# Patient Record
Sex: MALE | Race: WHITE | Employment: UNEMPLOYED | URBAN - METROPOLITAN AREA
[De-identification: names, ages, dates, MRNs, and addresses within clinical notes are randomized per-mention and may not be internally consistent; named-entity substitution may affect disease eponyms.]

---

## 2018-04-20 ENCOUNTER — HOSPITAL ENCOUNTER (OUTPATIENT)
Age: 58
Discharge: HOME | End: 2018-04-22
Payer: MEDICARE

## 2018-04-20 PROCEDURE — 80076 HEPATIC FUNCTION PANEL: CPT

## 2018-04-21 LAB
ALBUMIN SERPL-MCNC: 4.2 G/DL (ref 3.5–5.2)
ALP BLD-CCNC: 61 U/L (ref 40–129)
ALT SERPL-CCNC: 11 U/L (ref 0–40)
AST SERPL-CCNC: 17 U/L (ref 0–39)
BILIRUB SERPL-MCNC: 0.2 MG/DL (ref 0–1.2)
BILIRUBIN DIRECT: <0.2 MG/DL (ref 0–0.3)
BILIRUBIN, INDIRECT: NORMAL MG/DL (ref 0–1)
TOTAL PROTEIN: 7.5 G/DL (ref 6.4–8.3)

## 2020-10-22 ENCOUNTER — OFFICE VISIT (OUTPATIENT)
Dept: FAMILY MEDICINE CLINIC | Age: 60
End: 2020-10-22
Payer: MEDICARE

## 2020-10-22 VITALS
BODY MASS INDEX: 25.62 KG/M2 | SYSTOLIC BLOOD PRESSURE: 136 MMHG | TEMPERATURE: 97.8 F | DIASTOLIC BLOOD PRESSURE: 84 MMHG | WEIGHT: 173 LBS | OXYGEN SATURATION: 98 % | HEART RATE: 68 BPM | HEIGHT: 69 IN

## 2020-10-22 PROBLEM — I10 ESSENTIAL HYPERTENSION: Status: ACTIVE | Noted: 2020-10-22

## 2020-10-22 PROBLEM — F41.9 ANXIETY: Status: ACTIVE | Noted: 2020-10-22

## 2020-10-22 PROCEDURE — 99214 OFFICE O/P EST MOD 30 MIN: CPT | Performed by: FAMILY MEDICINE

## 2020-10-22 RX ORDER — ESCITALOPRAM OXALATE 20 MG/1
20 TABLET ORAL DAILY
Qty: 30 TABLET | Refills: 5 | Status: SHIPPED
Start: 2020-10-22 | End: 2021-01-11 | Stop reason: SDUPTHER

## 2020-10-22 RX ORDER — HYDROXYZINE PAMOATE 25 MG/1
CAPSULE ORAL
COMMUNITY
Start: 2019-07-15 | End: 2020-10-22 | Stop reason: SDUPTHER

## 2020-10-22 RX ORDER — ESCITALOPRAM OXALATE 20 MG/1
TABLET ORAL
COMMUNITY
Start: 2019-05-30 | End: 2020-10-22 | Stop reason: SDUPTHER

## 2020-10-22 RX ORDER — HYDROCHLOROTHIAZIDE 25 MG/1
25 TABLET ORAL DAILY
Qty: 30 TABLET | Refills: 5 | Status: SHIPPED
Start: 2020-10-22 | End: 2021-01-11 | Stop reason: SDUPTHER

## 2020-10-22 RX ORDER — HYDROXYZINE PAMOATE 25 MG/1
25 CAPSULE ORAL 3 TIMES DAILY PRN
Qty: 90 CAPSULE | Refills: 2 | Status: SHIPPED
Start: 2020-10-22 | End: 2021-01-11 | Stop reason: SDUPTHER

## 2020-10-22 RX ORDER — HYDROCHLOROTHIAZIDE 25 MG/1
TABLET ORAL
COMMUNITY
Start: 2019-05-30 | End: 2020-10-22 | Stop reason: SDUPTHER

## 2020-10-22 ASSESSMENT — ENCOUNTER SYMPTOMS
NAUSEA: 0
SHORTNESS OF BREATH: 0
VOMITING: 0
WHEEZING: 0

## 2020-10-22 NOTE — PROGRESS NOTES
89 Shaista Hager presents to the office today for   Chief Complaint   Patient presents with    Annual Exam     Anxiety and Depression  Taking Lexapro 20 mg po qd  It is helping  Using Vistaril to help him sleep with good effect    Hypertension  HCTZ without side effects  Feeling well    Sees Dr. Rizwan Miner for PSA yearly  Up to date    Cologuard done 2019 and neg through [facility]    Review of Systems   Constitutional: Negative for chills and fever. Respiratory: Negative for shortness of breath and wheezing. Cardiovascular: Negative for chest pain and palpitations. Gastrointestinal: Negative for nausea and vomiting. Genitourinary: Negative for dysuria, hematuria and urgency. Skin: Negative for rash. Neurological: Negative for dizziness and light-headedness. /84   Pulse 68   Temp 97.8 °F (36.6 °C) (Temporal)   Ht 5' 9\" (1.753 m)   Wt 173 lb (78.5 kg)   SpO2 98%   BMI 25.55 kg/m²   Physical Exam  Constitutional:       Appearance: Normal appearance. HENT:      Head: Normocephalic and atraumatic. Eyes:      Extraocular Movements: Extraocular movements intact. Conjunctiva/sclera: Conjunctivae normal.   Cardiovascular:      Rate and Rhythm: Normal rate. Heart sounds: Normal heart sounds. Pulmonary:      Effort: Pulmonary effort is normal.      Breath sounds: Normal breath sounds. Skin:     General: Skin is warm. Neurological:      Mental Status: He is alert and oriented to person, place, and time.    Psychiatric:         Mood and Affect: Mood normal.         Behavior: Behavior normal.            Current Outpatient Medications:     escitalopram (LEXAPRO) 20 MG tablet, Take 1 tablet by mouth daily, Disp: 30 tablet, Rfl: 5    hydroCHLOROthiazide (HYDRODIURIL) 25 MG tablet, Take 1 tablet by mouth daily, Disp: 30 tablet, Rfl: 5    hydrOXYzine (VISTARIL) 25 MG capsule, Take 1 capsule by mouth 3 times daily as needed for Itching or Anxiety,

## 2021-01-11 DIAGNOSIS — I10 ESSENTIAL HYPERTENSION: ICD-10-CM

## 2021-01-11 DIAGNOSIS — F41.9 ANXIETY: ICD-10-CM

## 2021-01-11 RX ORDER — HYDROXYZINE PAMOATE 25 MG/1
25 CAPSULE ORAL 3 TIMES DAILY PRN
Qty: 90 CAPSULE | Refills: 2 | Status: SHIPPED
Start: 2021-01-11 | End: 2021-04-04 | Stop reason: SDUPTHER

## 2021-01-11 RX ORDER — ESCITALOPRAM OXALATE 20 MG/1
20 TABLET ORAL DAILY
Qty: 30 TABLET | Refills: 5 | Status: SHIPPED
Start: 2021-01-11 | End: 2021-01-21

## 2021-01-11 RX ORDER — HYDROCHLOROTHIAZIDE 25 MG/1
25 TABLET ORAL DAILY
Qty: 30 TABLET | Refills: 5 | Status: SHIPPED
Start: 2021-01-11 | End: 2022-01-18 | Stop reason: SDUPTHER

## 2021-01-11 NOTE — TELEPHONE ENCOUNTER
Last Appointment:  10/22/2020  Future Appointments   Date Time Provider Tyshawn Luo   1/21/2021  3:15 PM Dionna Pop  [address] [place] [address]

## 2021-01-21 ENCOUNTER — VIRTUAL VISIT (OUTPATIENT)
Dept: FAMILY MEDICINE CLINIC | Age: 61
End: 2021-01-21
Payer: MEDICARE

## 2021-01-21 DIAGNOSIS — F41.9 ANXIETY: Primary | ICD-10-CM

## 2021-01-21 PROCEDURE — G8419 CALC BMI OUT NRM PARAM NOF/U: HCPCS | Performed by: FAMILY MEDICINE

## 2021-01-21 PROCEDURE — G8484 FLU IMMUNIZE NO ADMIN: HCPCS | Performed by: FAMILY MEDICINE

## 2021-01-21 PROCEDURE — 3017F COLORECTAL CA SCREEN DOC REV: CPT | Performed by: FAMILY MEDICINE

## 2021-01-21 PROCEDURE — 1036F TOBACCO NON-USER: CPT | Performed by: FAMILY MEDICINE

## 2021-01-21 PROCEDURE — 99213 OFFICE O/P EST LOW 20 MIN: CPT | Performed by: FAMILY MEDICINE

## 2021-01-21 PROCEDURE — G8427 DOCREV CUR MEDS BY ELIG CLIN: HCPCS | Performed by: FAMILY MEDICINE

## 2021-01-21 RX ORDER — FLUVOXAMINE MALEATE 50 MG/1
50 TABLET, COATED ORAL NIGHTLY
Qty: 30 TABLET | Refills: 1 | Status: SHIPPED
Start: 2021-01-21 | End: 2021-03-19

## 2021-01-21 RX ORDER — ESCITALOPRAM OXALATE 10 MG/1
10 TABLET ORAL DAILY
Qty: 14 TABLET | Refills: 0 | Status: SHIPPED
Start: 2021-01-21 | End: 2022-01-26

## 2021-01-21 ASSESSMENT — PATIENT HEALTH QUESTIONNAIRE - PHQ9
SUM OF ALL RESPONSES TO PHQ QUESTIONS 1-9: 12
2. FEELING DOWN, DEPRESSED OR HOPELESS: 2
4. FEELING TIRED OR HAVING LITTLE ENERGY: 1
SUM OF ALL RESPONSES TO PHQ QUESTIONS 1-9: 12
7. TROUBLE CONCENTRATING ON THINGS, SUCH AS READING THE NEWSPAPER OR WATCHING TELEVISION: 1
3. TROUBLE FALLING OR STAYING ASLEEP: 1

## 2021-01-21 NOTE — PROGRESS NOTES
89 Shaista Hager presents via virtual visit  Chief Complaint   Patient presents with    Anxiety     Anxiety and Depression  Still taking Lexapro   Dad and GF don't think it is helping  Zoloft in the past ineffective  Lost 20 lbs but gained about 5 lbs back   2 panic attacks in past few months    Review of Systems     There were no vitals taken for this visit. Physical Exam  Constitutional:       Appearance: Normal appearance. HENT:      Head: Normocephalic and atraumatic. Eyes:      Extraocular Movements: Extraocular movements intact. Conjunctiva/sclera: Conjunctivae normal.   Cardiovascular:      Rate and Rhythm: Normal rate. Heart sounds: Normal heart sounds. Pulmonary:      Effort: Pulmonary effort is normal.      Breath sounds: Normal breath sounds. Skin:     General: Skin is warm. Neurological:      Mental Status: He is alert and oriented to person, place, and time. Psychiatric:         Attention and Perception: Attention normal.         Mood and Affect: Mood is anxious. Speech: Speech normal.         Behavior: Behavior normal.         Thought Content: Thought content normal.         Cognition and Memory: Cognition normal.         Judgment: Judgment normal.            Current Outpatient Medications:     escitalopram (LEXAPRO) 10 MG tablet, Take 1 tablet by mouth daily for 14 days, Disp: 14 tablet, Rfl: 0    fluvoxaMINE (LUVOX) 50 MG tablet, Take 1 tablet by mouth nightly, Disp: 30 tablet, Rfl: 1    hydroCHLOROthiazide (HYDRODIURIL) 25 MG tablet, Take 1 tablet by mouth daily, Disp: 30 tablet, Rfl: 5    hydrOXYzine (VISTARIL) 25 MG capsule, Take 1 capsule by mouth 3 times daily as needed for Itching or Anxiety, Disp: 90 capsule, Rfl: 2     No past medical history on file. Lynne Bhardwaj was seen today for anxiety. Diagnoses and all orders for this visit:    Anxiety  -     escitalopram (LEXAPRO) 10 MG tablet;  Take 1 tablet by mouth daily for 14 days  - fluvoxaMINE (LUVOX) 50 MG tablet;  Take 1 tablet by mouth nightly       Wean Lexapro 10 mg over next 2 weeks  Start Luvox 50 mg  Recheck 1 month    Kirti Hoover MD

## 2021-01-23 ENCOUNTER — PATIENT MESSAGE (OUTPATIENT)
Dept: FAMILY MEDICINE CLINIC | Age: 61
End: 2021-01-23

## 2021-01-25 RX ORDER — BUSPIRONE HYDROCHLORIDE 5 MG/1
5 TABLET ORAL 2 TIMES DAILY
Qty: 60 TABLET | Refills: 0 | Status: SHIPPED
Start: 2021-01-25 | End: 2021-02-16

## 2021-01-25 NOTE — TELEPHONE ENCOUNTER
From: Irene Bejarano  To: J Luis Fritz MD  Sent: 1/23/2021 6:02 PM EST  Subject: Non-Urgent Medical Question    i am in need of a anxiety medication for day time since that when i have the most anxiety

## 2021-01-26 ENCOUNTER — TELEPHONE (OUTPATIENT)
Dept: FAMILY MEDICINE CLINIC | Age: 61
End: 2021-01-26

## 2021-01-26 NOTE — TELEPHONE ENCOUNTER
Spouse ros calling last night before bed he took fluvoxamine 50mg a half tab and hydroxyzine 25mg a half tab. States this raised his bp high and he didn't sleep all night. States she does not know what the reading was. States his bp Is fine now, but does not know the reading. He wants to know if this is safe to take again or what he should do ?

## 2021-01-26 NOTE — TELEPHONE ENCOUNTER
Called and spoke with pt's spouse and let her know to have pt stop hydroxyzine and to continue fluvoxamine and buspar Dr. Shanta Baldwin. Sent to pharmacy.  If they have any other problems to call the office

## 2021-02-01 ENCOUNTER — TELEPHONE (OUTPATIENT)
Dept: FAMILY MEDICINE CLINIC | Age: 61
End: 2021-02-01

## 2021-02-01 NOTE — TELEPHONE ENCOUNTER
Girlfriend, Isidro Wu called to say that pt wants to continue taking the Vistaril and would like to be on a medication that works with that.

## 2021-02-16 RX ORDER — BUSPIRONE HYDROCHLORIDE 5 MG/1
TABLET ORAL
Qty: 60 TABLET | Refills: 5 | Status: SHIPPED
Start: 2021-02-16 | End: 2021-09-22 | Stop reason: SDUPTHER

## 2021-02-16 NOTE — TELEPHONE ENCOUNTER
Last Appointment:  1/21/2021  Future Appointments   Date Time Provider Tyshawn Luo   2/18/2021  3:00 PM Kirti Perez  W [facility] [address]

## 2021-03-11 RX ORDER — TRAZODONE HYDROCHLORIDE 100 MG/1
100 TABLET ORAL NIGHTLY
Qty: 30 TABLET | Refills: 0 | Status: SHIPPED
Start: 2021-03-11 | End: 2021-04-04 | Stop reason: SDUPTHER

## 2021-03-19 DIAGNOSIS — F41.9 ANXIETY: ICD-10-CM

## 2021-03-19 RX ORDER — FLUVOXAMINE MALEATE 50 MG/1
TABLET, COATED ORAL
Qty: 30 TABLET | Refills: 5 | Status: SHIPPED
Start: 2021-03-19 | End: 2021-04-05

## 2021-04-04 DIAGNOSIS — F41.9 ANXIETY: ICD-10-CM

## 2021-04-04 RX ORDER — BUSPIRONE HYDROCHLORIDE 5 MG/1
TABLET ORAL
Qty: 60 TABLET | Refills: 5 | Status: CANCELLED | OUTPATIENT
Start: 2021-04-04

## 2021-04-04 RX ORDER — FLUVOXAMINE MALEATE 50 MG/1
TABLET, COATED ORAL
Qty: 30 TABLET | Refills: 5 | Status: CANCELLED | OUTPATIENT
Start: 2021-04-04

## 2021-04-05 RX ORDER — TRAZODONE HYDROCHLORIDE 100 MG/1
100 TABLET ORAL NIGHTLY
Qty: 30 TABLET | Refills: 0 | Status: SHIPPED
Start: 2021-04-05 | End: 2021-04-28

## 2021-04-05 RX ORDER — HYDROXYZINE PAMOATE 25 MG/1
25 CAPSULE ORAL 3 TIMES DAILY PRN
Qty: 90 CAPSULE | Refills: 2 | Status: SHIPPED
Start: 2021-04-05 | End: 2021-07-22

## 2021-04-28 RX ORDER — TRAZODONE HYDROCHLORIDE 100 MG/1
100 TABLET ORAL NIGHTLY
Qty: 30 TABLET | Refills: 0 | Status: SHIPPED
Start: 2021-04-28 | Stop reason: SDUPTHER

## 2021-06-01 RX ORDER — TRAZODONE HYDROCHLORIDE 100 MG/1
100 TABLET ORAL NIGHTLY
Qty: 30 TABLET | Refills: 0 | Status: SHIPPED
Start: 2021-06-01 | End: 2021-06-24

## 2021-06-01 NOTE — TELEPHONE ENCOUNTER
Voicemail left for patient to return our call at their earliest convenience. Needs to make an appointment. Last Appointment:  1/21/2021  No future appointments.

## 2021-06-24 RX ORDER — TRAZODONE HYDROCHLORIDE 100 MG/1
100 TABLET ORAL NIGHTLY
Qty: 30 TABLET | Refills: 0 | Status: SHIPPED
Start: 2021-06-24 | End: 2021-07-26

## 2021-07-21 DIAGNOSIS — F41.9 ANXIETY: ICD-10-CM

## 2021-07-22 RX ORDER — HYDROXYZINE PAMOATE 25 MG/1
CAPSULE ORAL
Qty: 90 CAPSULE | Refills: 0 | Status: SHIPPED
Start: 2021-07-22 | End: 2021-08-23

## 2021-07-26 RX ORDER — TRAZODONE HYDROCHLORIDE 100 MG/1
TABLET ORAL
Qty: 30 TABLET | Refills: 0 | Status: SHIPPED
Start: 2021-07-26 | End: 2022-01-18 | Stop reason: SDUPTHER

## 2021-07-26 NOTE — TELEPHONE ENCOUNTER
Last Appointment:  1/21/2021  Future Appointments   Date Time Provider Tyshawn Luo   8/10/2021  9:30 AM Layo Mcfarland  [address] [place] [address]

## 2021-07-27 DIAGNOSIS — Z00.00 PREVENTATIVE HEALTH CARE: ICD-10-CM

## 2021-07-27 DIAGNOSIS — I10 ESSENTIAL HYPERTENSION: ICD-10-CM

## 2021-07-27 LAB
ALBUMIN SERPL-MCNC: 4 G/DL (ref 3.5–5.2)
ALP BLD-CCNC: 60 U/L (ref 40–129)
ALT SERPL-CCNC: 13 U/L (ref 0–40)
ANION GAP SERPL CALCULATED.3IONS-SCNC: 15 MMOL/L (ref 7–16)
AST SERPL-CCNC: 12 U/L (ref 0–39)
BILIRUB SERPL-MCNC: 0.3 MG/DL (ref 0–1.2)
BUN BLDV-MCNC: 15 MG/DL (ref 6–23)
CALCIUM SERPL-MCNC: 9.4 MG/DL (ref 8.6–10.2)
CHLORIDE BLD-SCNC: 100 MMOL/L (ref 98–107)
CHOLESTEROL, TOTAL: 198 MG/DL (ref 0–199)
CO2: 25 MMOL/L (ref 22–29)
CREAT SERPL-MCNC: 1.1 MG/DL (ref 0.7–1.2)
GFR AFRICAN AMERICAN: >60
GFR NON-AFRICAN AMERICAN: >60 ML/MIN/1.73
GLUCOSE BLD-MCNC: 115 MG/DL (ref 74–99)
HCT VFR BLD CALC: 46.5 % (ref 37–54)
HDLC SERPL-MCNC: 48 MG/DL
HEMOGLOBIN: 14.9 G/DL (ref 12.5–16.5)
LDL CHOLESTEROL CALCULATED: 120 MG/DL (ref 0–99)
MCH RBC QN AUTO: 30.6 PG (ref 26–35)
MCHC RBC AUTO-ENTMCNC: 32 % (ref 32–34.5)
MCV RBC AUTO: 95.5 FL (ref 80–99.9)
PDW BLD-RTO: 13.8 FL (ref 11.5–15)
PLATELET # BLD: 275 E9/L (ref 130–450)
PMV BLD AUTO: 10.5 FL (ref 7–12)
POTASSIUM SERPL-SCNC: 3.1 MMOL/L (ref 3.5–5)
RBC # BLD: 4.87 E12/L (ref 3.8–5.8)
SODIUM BLD-SCNC: 140 MMOL/L (ref 132–146)
TOTAL PROTEIN: 6.9 G/DL (ref 6.4–8.3)
TRIGL SERPL-MCNC: 148 MG/DL (ref 0–149)
TSH SERPL DL<=0.05 MIU/L-ACNC: 3.28 UIU/ML (ref 0.27–4.2)
VLDLC SERPL CALC-MCNC: 30 MG/DL
WBC # BLD: 6.3 E9/L (ref 4.5–11.5)

## 2021-07-28 RX ORDER — POTASSIUM CHLORIDE 750 MG/1
10 TABLET, EXTENDED RELEASE ORAL DAILY
Qty: 30 TABLET | Refills: 0 | Status: SHIPPED
Start: 2021-07-28 | End: 2021-08-23

## 2021-08-11 ENCOUNTER — TELEPHONE (OUTPATIENT)
Dept: FAMILY MEDICINE CLINIC | Age: 61
End: 2021-08-11

## 2021-08-11 NOTE — TELEPHONE ENCOUNTER
----- Message from Reemamare Littlejohn sent at 8/9/2021  1:04 PM EDT -----  Subject: Appointment Request    Reason for Call: New Patient Request Appointment    QUESTIONS  Type of Appointment? New Patient/New to Provider  Reason for appointment request? No appointments available during search  Additional Information for Provider? wanting to r/s tomorrow appt with Dr Jose G Rodriguez  ---------------------------------------------------------------------------  --------------  CALL BACK INFO  What is the best way for the office to contact you? OK to leave message on   voicemail  Preferred Call Back Phone Number? [phone]  ---------------------------------------------------------------------------  --------------  SCRIPT ANSWERS  Relationship to Patient? Self  Have your symptoms changed? No  Have you been diagnosed with, awaiting test results for, or told that you   are suspected of having COVID-19 (Coronavirus)? (If patient has tested   negative or was tested as a requirement for work, school, or travel and   not based on symptoms, answer no)? No  Do you currently have flu-like symptoms including fever or chills, cough,   shortness of breath, difficulty breathing, or new loss of taste or smell? No  Have you had close contact with someone with COVID-19 in the last 14 days? No  (Service Expert  click yes below to proceed with [employer] As Usual   Scheduling)?  Yes

## 2021-08-11 NOTE — TELEPHONE ENCOUNTER
Looks as though pt did call on 8/9/21 to reschedule appt with Dr. Brianda Scott but had not received a call return call from our office and was marked a no show. Voicemail left for patient to return our call at their earliest convenience.

## 2021-08-18 DIAGNOSIS — F41.9 ANXIETY: ICD-10-CM

## 2021-08-23 RX ORDER — POTASSIUM CHLORIDE 750 MG/1
TABLET, EXTENDED RELEASE ORAL
Qty: 90 TABLET | Refills: 0 | Status: SHIPPED
Start: 2021-08-23 | End: 2021-09-22 | Stop reason: SDUPTHER

## 2021-08-23 RX ORDER — HYDROXYZINE PAMOATE 25 MG/1
CAPSULE ORAL
Qty: 90 CAPSULE | Refills: 0 | Status: SHIPPED
Start: 2021-08-23 | End: 2022-01-26 | Stop reason: SDUPTHER

## 2021-08-23 NOTE — TELEPHONE ENCOUNTER
Tried calling pt to notify him that he will need to find a new provider per Dr. Elias Giraldo previous message/ phone rings/no answer and no voicemail/unable to leave a message. Last Appointment:  1/21/2021  No future appointments.

## 2021-08-23 NOTE — TELEPHONE ENCOUNTER
Please notify pt he will need to find new PCP  He had appt to establish with Dr. Jose G Rodriguez and no showed it  He has cancelled all of his appts with me in the last 6 months

## 2021-09-21 RX ORDER — POTASSIUM CHLORIDE 750 MG/1
TABLET, EXTENDED RELEASE ORAL
Qty: 90 TABLET | Refills: 0 | OUTPATIENT
Start: 2021-09-21

## 2021-09-21 RX ORDER — BUSPIRONE HYDROCHLORIDE 5 MG/1
TABLET ORAL
Qty: 60 TABLET | Refills: 5 | OUTPATIENT
Start: 2021-09-21

## 2021-09-21 NOTE — TELEPHONE ENCOUNTER
Doctor please advise. Per patient. i am going to stay with doctor Lorenso Burkitt, MD i changed my mind on changing doctors      He is requesting refills on Buspar and Potassium. Per previous note from 8/18/21 it looks as though he was going to establish with someone else and had cancelled several appointments with you.

## 2021-09-22 RX ORDER — POTASSIUM CHLORIDE 750 MG/1
TABLET, EXTENDED RELEASE ORAL
Qty: 30 TABLET | Refills: 0 | Status: SHIPPED
Start: 2021-09-22 | End: 2022-01-18 | Stop reason: SDUPTHER

## 2021-09-22 RX ORDER — BUSPIRONE HYDROCHLORIDE 5 MG/1
TABLET ORAL
Qty: 60 TABLET | Refills: 0 | Status: SHIPPED
Start: 2021-09-22 | End: 2022-01-18 | Stop reason: SDUPTHER

## 2021-09-22 NOTE — TELEPHONE ENCOUNTER
Left message for pt and sent my chart note advising a 30 day supply was sent and to establish with a new PCP.

## 2021-09-22 NOTE — TELEPHONE ENCOUNTER
I sent in 30 days of medications but he needs to find a new PCP  I do not feel comfortable seeing him as desired to switch PCP

## 2022-01-11 ENCOUNTER — PATIENT MESSAGE (OUTPATIENT)
Dept: FAMILY MEDICINE CLINIC | Age: 62
End: 2022-01-11

## 2022-01-11 DIAGNOSIS — I10 ESSENTIAL HYPERTENSION: ICD-10-CM

## 2022-01-11 RX ORDER — BUSPIRONE HYDROCHLORIDE 5 MG/1
TABLET ORAL
Qty: 60 TABLET | Refills: 0 | OUTPATIENT
Start: 2022-01-11

## 2022-01-13 NOTE — TELEPHONE ENCOUNTER
From: Gerber Ochoa  To: Dr. Avila Abbott: 2022 12:02 PM EST  Subject: refills    i just need refills till i get other doctor please thank u

## 2022-01-18 RX ORDER — TRAZODONE HYDROCHLORIDE 100 MG/1
TABLET ORAL
Qty: 10 TABLET | Refills: 0 | Status: SHIPPED | OUTPATIENT
Start: 2022-01-18

## 2022-01-18 RX ORDER — HYDROCHLOROTHIAZIDE 25 MG/1
25 TABLET ORAL DAILY
Qty: 10 TABLET | Refills: 0 | Status: SHIPPED
Start: 2022-01-18 | End: 2022-02-28 | Stop reason: SDUPTHER

## 2022-01-18 RX ORDER — POTASSIUM CHLORIDE 750 MG/1
TABLET, EXTENDED RELEASE ORAL
Qty: 10 TABLET | Refills: 0 | Status: SHIPPED
Start: 2022-01-18 | End: 2022-02-28 | Stop reason: SDUPTHER

## 2022-01-18 RX ORDER — BUSPIRONE HYDROCHLORIDE 5 MG/1
TABLET ORAL
Qty: 20 TABLET | Refills: 0 | Status: SHIPPED
Start: 2022-01-18 | End: 2022-01-26

## 2022-01-26 ENCOUNTER — OFFICE VISIT (OUTPATIENT)
Dept: FAMILY MEDICINE CLINIC | Age: 62
End: 2022-01-26
Payer: MEDICARE

## 2022-01-26 VITALS
DIASTOLIC BLOOD PRESSURE: 80 MMHG | HEART RATE: 97 BPM | HEIGHT: 69 IN | WEIGHT: 153.7 LBS | TEMPERATURE: 99.3 F | SYSTOLIC BLOOD PRESSURE: 130 MMHG | OXYGEN SATURATION: 97 % | BODY MASS INDEX: 22.77 KG/M2

## 2022-01-26 DIAGNOSIS — I10 ESSENTIAL HYPERTENSION: Primary | ICD-10-CM

## 2022-01-26 DIAGNOSIS — Z12.5 ENCOUNTER FOR PROSTATE CANCER SCREENING: ICD-10-CM

## 2022-01-26 DIAGNOSIS — F51.01 PRIMARY INSOMNIA: ICD-10-CM

## 2022-01-26 DIAGNOSIS — Z13.29 SCREENING FOR THYROID DISORDER: ICD-10-CM

## 2022-01-26 DIAGNOSIS — R23.4 OILY SKIN: ICD-10-CM

## 2022-01-26 DIAGNOSIS — E78.01 FAMILIAL HYPERCHOLESTEROLEMIA: ICD-10-CM

## 2022-01-26 DIAGNOSIS — F41.9 ANXIETY: ICD-10-CM

## 2022-01-26 PROCEDURE — 99214 OFFICE O/P EST MOD 30 MIN: CPT | Performed by: NURSE PRACTITIONER

## 2022-01-26 PROCEDURE — G8484 FLU IMMUNIZE NO ADMIN: HCPCS | Performed by: NURSE PRACTITIONER

## 2022-01-26 PROCEDURE — G8427 DOCREV CUR MEDS BY ELIG CLIN: HCPCS | Performed by: NURSE PRACTITIONER

## 2022-01-26 PROCEDURE — 3017F COLORECTAL CA SCREEN DOC REV: CPT | Performed by: NURSE PRACTITIONER

## 2022-01-26 PROCEDURE — G8420 CALC BMI NORM PARAMETERS: HCPCS | Performed by: NURSE PRACTITIONER

## 2022-01-26 PROCEDURE — 1036F TOBACCO NON-USER: CPT | Performed by: NURSE PRACTITIONER

## 2022-01-26 RX ORDER — HYDROXYZINE PAMOATE 25 MG/1
CAPSULE ORAL
Qty: 90 CAPSULE | Refills: 0 | Status: SHIPPED
Start: 2022-01-26 | End: 2022-03-01 | Stop reason: SDUPTHER

## 2022-01-26 RX ORDER — BUSPIRONE HYDROCHLORIDE 10 MG/1
10 TABLET ORAL 2 TIMES DAILY
Qty: 60 TABLET | Refills: 0 | Status: SHIPPED
Start: 2022-01-26 | End: 2022-03-07 | Stop reason: SDUPTHER

## 2022-01-26 RX ORDER — BUSPIRONE HYDROCHLORIDE 5 MG/1
TABLET ORAL
Qty: 20 TABLET | Refills: 0 | Status: CANCELLED | OUTPATIENT
Start: 2022-01-26

## 2022-01-26 SDOH — ECONOMIC STABILITY: FOOD INSECURITY: WITHIN THE PAST 12 MONTHS, YOU WORRIED THAT YOUR FOOD WOULD RUN OUT BEFORE YOU GOT MONEY TO BUY MORE.: NEVER TRUE

## 2022-01-26 SDOH — HEALTH STABILITY: PHYSICAL HEALTH: ON AVERAGE, HOW MANY DAYS PER WEEK DO YOU ENGAGE IN MODERATE TO STRENUOUS EXERCISE (LIKE A BRISK WALK)?: 0 DAYS

## 2022-01-26 SDOH — ECONOMIC STABILITY: FOOD INSECURITY: WITHIN THE PAST 12 MONTHS, THE FOOD YOU BOUGHT JUST DIDN'T LAST AND YOU DIDN'T HAVE MONEY TO GET MORE.: NEVER TRUE

## 2022-01-26 SDOH — HEALTH STABILITY: PHYSICAL HEALTH: ON AVERAGE, HOW MANY MINUTES DO YOU ENGAGE IN EXERCISE AT THIS LEVEL?: 0 MIN

## 2022-01-26 ASSESSMENT — SOCIAL DETERMINANTS OF HEALTH (SDOH)
WITHIN THE LAST YEAR, HAVE YOU BEEN KICKED, HIT, SLAPPED, OR OTHERWISE PHYSICALLY HURT BY YOUR PARTNER OR EX-PARTNER?: NO
HOW HARD IS IT FOR YOU TO PAY FOR THE VERY BASICS LIKE FOOD, HOUSING, MEDICAL CARE, AND HEATING?: NOT HARD AT ALL
WITHIN THE LAST YEAR, HAVE TO BEEN RAPED OR FORCED TO HAVE ANY KIND OF SEXUAL ACTIVITY BY YOUR PARTNER OR EX-PARTNER?: NO
WITHIN THE LAST YEAR, HAVE YOU BEEN AFRAID OF YOUR PARTNER OR EX-PARTNER?: NO
WITHIN THE LAST YEAR, HAVE YOU BEEN HUMILIATED OR EMOTIONALLY ABUSED IN OTHER WAYS BY YOUR PARTNER OR EX-PARTNER?: NO

## 2022-01-26 ASSESSMENT — ENCOUNTER SYMPTOMS
TROUBLE SWALLOWING: 0
WHEEZING: 0
CHEST TIGHTNESS: 0
VOMITING: 0
BACK PAIN: 0
COUGH: 0
VOICE CHANGE: 0
BLOOD IN STOOL: 0
DIARRHEA: 0
NAUSEA: 0
ABDOMINAL PAIN: 0
SHORTNESS OF BREATH: 0
CONSTIPATION: 0

## 2022-01-26 ASSESSMENT — PATIENT HEALTH QUESTIONNAIRE - PHQ9
SUM OF ALL RESPONSES TO PHQ QUESTIONS 1-9: 5
3. TROUBLE FALLING OR STAYING ASLEEP: 1
2. FEELING DOWN, DEPRESSED OR HOPELESS: 0
6. FEELING BAD ABOUT YOURSELF - OR THAT YOU ARE A FAILURE OR HAVE LET YOURSELF OR YOUR FAMILY DOWN: 0
7. TROUBLE CONCENTRATING ON THINGS, SUCH AS READING THE NEWSPAPER OR WATCHING TELEVISION: 0
4. FEELING TIRED OR HAVING LITTLE ENERGY: 0
SUM OF ALL RESPONSES TO PHQ QUESTIONS 1-9: 5
SUM OF ALL RESPONSES TO PHQ9 QUESTIONS 1 & 2: 2
SUM OF ALL RESPONSES TO PHQ QUESTIONS 1-9: 5
1. LITTLE INTEREST OR PLEASURE IN DOING THINGS: 2
5. POOR APPETITE OR OVEREATING: 0
10. IF YOU CHECKED OFF ANY PROBLEMS, HOW DIFFICULT HAVE THESE PROBLEMS MADE IT FOR YOU TO DO YOUR WORK, TAKE CARE OF THINGS AT HOME, OR GET ALONG WITH OTHER PEOPLE: 1
8. MOVING OR SPEAKING SO SLOWLY THAT OTHER PEOPLE COULD HAVE NOTICED. OR THE OPPOSITE, BEING SO FIGETY OR RESTLESS THAT YOU HAVE BEEN MOVING AROUND A LOT MORE THAN USUAL: 2
SUM OF ALL RESPONSES TO PHQ QUESTIONS 1-9: 5
9. THOUGHTS THAT YOU WOULD BE BETTER OFF DEAD, OR OF HURTING YOURSELF: 0

## 2022-01-26 NOTE — PROGRESS NOTES
Zohra Chavez : 1960 Sex: male  Age: 65 y.o. Chief Complaint   Patient presents with    New Patient     establish     Anxiety       Assessment and Plan:    Sissy Gutierrez was seen today for new patient and anxiety. Diagnoses and all orders for this visit:    Essential hypertension  -     CBC Auto Differential; Future  -     Comprehensive Metabolic Panel, Fasting; Future  -     Urinalysis; Future    Anxiety  -     hydrOXYzine (VISTARIL) 25 MG capsule; take 1 capsule by mouth three times a day if needed for itching or anxiety    Primary insomnia    Familial hypercholesterolemia  -     Lipid Panel; Future    BMI 22.0-22.9, adult    Encounter for prostate cancer screening  -     PSA screening; Future    Oily skin  -     TSH without Reflex; Future  -     T4; Future  -     T3, Free; Future    Screening for thyroid disorder  -     TSH without Reflex; Future  -     T4; Future  -     T3, Free; Future    Other orders  -     busPIRone (BUSPAR) 10 MG tablet; Take 1 tablet by mouth 2 times daily        USPTF:    () Abnormal Blood Glucose and Type 2 Diabetes Mellitus: Screening -- Adults aged 36 to 79 years who are overweight or obese Grade: B (Recommended)    (B/P 130/80) High Blood Pressure: Screening and Home Monitoring -- Adults  Grade: A (Recommended) recommends screening for high blood pressure in ages 25 years or older. obtain measurements outside of the clinical setting for diagnostic confirmation before starting treatment. Annual screening for adults aged 36 years or older or those who are at increased risk for blood pressure    (  ) Colorectal Cancer: Screening --Adults aged 48 to 76 years  Grade: A (Recommended) recommends screening for colorectal cancer starting at age 48 years and continuing until age 76 years. (,  )  Lipid Disorders in Adults: Screening -- Men 28 and Older  Grade: A (Recommended) recommends screening men aged 28 and older for lipid disorders.      (Non Drinker) Alcohol Misuse: Screening and Behavioral Counseling Interventions in Primary Care -- Adults  Grade: B (Recommended) recommends that clinicians screen adults aged 25 years or older for alcohol misuse and provide persons engaged in risky or hazardous drinking with brief behavioral counseling interventions to reduce alcohol misuse. (  ) Abdominal Aortic Aneurysm: Screening -- Men Ages 72 to 76 Years Who Have Ever Smoked. Grade: B (Recommended) recommends one-time screening for abdominal aortic aneurysm (AAA) with ultrasonography in men ages 72 to 76 years who have ever smoked. (BMI 22.70)  Obesity: Screening for and Management of-- All Adults  Grade: B(Recommended) recommends screening all adults for obesity. Clinicians should offer or refer patients with a body mass index (BMI) of 30 kg/m2 or higher to intensive, multicomponent behavioral interventions. (Not a fall risk)  Fall Prevention -- Exercise/Physical Therapy: Community-dwelling Adults 72 Years or Older, Increased Risk for Falls   Grade: B (Recommended) recommends exercise or physical therapy to prevent falls in community-dwelling adults aged 72 years or older who are at increased risk for falls. (No new symptoms noted or reported today)  Depression: Screening -- General adult population, including pregnant and postpartum women  Grade: B(Recommended) recommends screening for depression in the general adult population,  Screening should be implemented with adequate systems in place to ensure accurate diagnosis, effective treatment, and appropriate follow-up.     (  ) Glaucoma: Screening - Adults and Diabetic Eye Exam     (  ) Thyroid Dysfunction: Screening --      (  ) Prostate Cancer: Prostate-Specific Antigen (PSA)-Based Screening -- All Men  PSA     (  ) Vitamin D Deficiency: Screening --         Educational materials  printed for patient's review and were included in patient instructions on his After Visit Summary and given to patient at the end of visit. Counseled regarding above diagnosis, including possible risks and complications,  especially if left uncontrolled. Counseled regarding the possible side effects, risks, benefits and alternatives to treatment; patient and/or guardian verbalizes understanding, agrees, feels comfortable with and wishes to proceed with above treatment plan. Advised patient to call with any new medication issues, and read all Rx info from pharmacy to assure aware of all possible risks and side effects of medication before taking. Reviewed age and gender appropriate health screening exams and vaccinations. Advised patient regarding importance of keeping up with recommended health maintenance and to schedule as soon as possible if overdue, as this is important in assessing for undiagnosed pathology, especially cancer, as well as protecting against potentially harmful/life threatening disease. Patient verbalizes understanding and agrees with above counseling, assessment and plan. All questions answered. On 01/26/22 I have spent 30 reviewing previous notes, test results and face to face with the patient discussing the diagnosis and importance of compliance with the treatment plan as well as documenting on the day of the visit. Educational materials exercises printed for patient's review and were included in patient instructions on their After Visit Summary and given to patient at the end of visit.      Return in about 3 months (around 4/26/2022) for Routine Visit with Labs. Mr. Reinier Morrison is a very pleasant 78-year-old white male who is here with his father and was a Dr. Antonio Hills patient but after she moved he needed to find a new PCP. He states that he is doing pretty well on his medications although he does have episodes where he has trouble sleeping. He told me that he hardly ever takes the trazodone and generally will take the Vistaril for sleep.   He presents today for evaluation and management of chronic medical problems. Current medication list reviewed. The patient is tolerating all medications well without adverse events or known side effects. The patient does understand the risk and benefits of the prescribed medications. The patient is not up-to-date on all age-appropriate wellness issues. Patient denies any reccent hospitalizations or ER visit. Acute Complaints reported:      Reporting oil production in excess on the scalp for 5 months. Never has had it before typically has been shaving his head and is now growing it out and it more evident now that the hair is oily. Still having panic attacks at night and his blood pressure goes up. CHRONIC CONDITION:    HTN: Stable hypertension, controlled on    potassium chloride (KLOR-CON M) 10 MEQ extended release tablet, take 1 tablet by mouth once daily, Disp: 10 tablet, Rfl: 0   hydroCHLOROthiazide (HYDRODIURIL) 25 MG tablet, Take 1 tablet by mouth daily, Disp: 10 tablet, Rfl: 0 remains at a mild intensity but overall good control, without symptoms, no ringing in the ears, no headaches and no nose bleeds. Better on medications. Hyperlipidemia: Mild in intensity but controlled on diet, without symptoms, no complications with dietary treatment regimen reporting no side effects or intolereances. Compliant with treatment and diet. No muscle aches, new joint pains or abd pain. Depression/Anxiety/Insomnia: Stable depression with generalized anxiety.  Mild in intensity but well controlled on    busPIRone (BUSPAR) 5 MG tablet, take 1 tablet by mouth twice a day, Disp: 20 tablet, Rfl: 0  traZODone (DESYREL) 100 MG tablet, take 1 tablet by mouth every evening, Disp: 10 tablet, Rfl: 0   hydrOXYzine (VISTARIL) 25 MG capsule, take 1 capsule by mouth three times a day if needed for itching or anxiety, Disp: 90 capsule, Rfl: 0 without symptoms, no weight gain, no increase in anxiety, no suicidal or homicidal ideation's no Relation Age of Onset    Hypertension Father      Social History     Socioeconomic History    Marital status: Single     Spouse name: Not on file    Number of children: Not on file    Years of education: Not on file    Highest education level: Not on file   Occupational History    Not on file   Tobacco Use    Smoking status: Never Smoker    Smokeless tobacco: Never Used   Vaping Use    Vaping Use: Never used   Substance and Sexual Activity    Alcohol use: Never    Drug use: Never    Sexual activity: Not on file   Other Topics Concern    Not on file   Social History Narrative    Not on file     Social Determinants of Health     Financial Resource Strain: Low Risk     Difficulty of Paying Living Expenses: Not hard at all   Food Insecurity: No Food Insecurity    Worried About [address] in the Last Year: Never true    [address] [place] in the Last Year: Never true   Transportation Needs:     Lack of Transportation (Medical): Not on file    Lack of Transportation (Non-Medical):  Not on file   Physical Activity: Inactive    Days of Exercise per Week: 0 days    Minutes of Exercise per Session: 0 min   Stress:     Feeling of Stress : Not on file   Social Connections:     Frequency of Communication with Friends and Family: Not on file    Frequency of Social Gatherings with Friends and Family: Not on file    Attends Catholic Services: Not on file    Active Member of Clubs or Organizations: Not on file    Attends Club or Organization Meetings: Not on file    Marital Status: Not on file   Intimate Partner Violence: Not At Risk    Fear of Current or Ex-Partner: No    Emotionally Abused: No    Physically Abused: No    Sexually Abused: No   Housing Stability:     Unable to Pay for Housing in the Last Year: Not on file    Number of [place] in the Last Year: Not on file    Unstable Housing in the Last Year: Not on file       Vitals:    01/26/22 1528   BP: 130/80   Site: Left Upper Arm Position: Sitting   Cuff Size: Medium Adult   Pulse: 97   Temp: 99.3 °F (37.4 °C)   TempSrc: Temporal   SpO2: 97%   Weight: 153 lb 11.2 oz (69.7 kg)   Height: 5' 9\" (1.753 m)       Physical Exam  Vitals and nursing note reviewed. Constitutional:       Appearance: Normal appearance. HENT:      Head: Normocephalic. Right Ear: Tympanic membrane and ear canal normal. There is no impacted cerumen. Left Ear: Tympanic membrane and ear canal normal. There is no impacted cerumen. Nose: Nose normal.      Mouth/Throat:      Mouth: Mucous membranes are dry. Eyes:      Extraocular Movements: Extraocular movements intact. Pupils: Pupils are equal, round, and reactive to light. Neck:      Vascular: No carotid bruit. Cardiovascular:      Rate and Rhythm: Normal rate and regular rhythm. Pulses: Normal pulses. Heart sounds: Normal heart sounds. No murmur heard. No friction rub. No gallop. Pulmonary:      Effort: Pulmonary effort is normal. No respiratory distress. Breath sounds: Normal breath sounds. No stridor. No wheezing, rhonchi or rales. Chest:      Chest wall: No tenderness. Abdominal:      General: Bowel sounds are normal. There is no distension. Palpations: Abdomen is soft. Musculoskeletal:         General: No swelling, tenderness, deformity or signs of injury. Cervical back: No rigidity. No muscular tenderness. Right lower leg: No edema. Left lower leg: No edema. Lymphadenopathy:      Cervical: No cervical adenopathy. Skin:     General: Skin is warm and dry. Capillary Refill: Capillary refill takes 2 to 3 seconds. Findings: No bruising, lesion or rash. Neurological:      General: No focal deficit present. Mental Status: He is alert and oriented to person, place, and time. Motor: No weakness.       Gait: Gait normal.   Psychiatric:         Attention and Perception: Attention normal.         Mood and Affect: Mood normal. Behavior: Behavior normal.         Thought Content: Thought content does not include homicidal or suicidal ideation. Thought content does not include homicidal or suicidal plan.                 Seen By:  MALACHI Hutchinson - CNP

## 2022-01-26 NOTE — PATIENT INSTRUCTIONS
Patient Education        Body Mass Index: Care Instructions  Your Care Instructions     Body mass index (BMI) can help you see if your weight is raising your risk for health problems. It uses a formula to compare how much you weigh with how tall you are. · A BMI lower than 18.5 is considered underweight. · A BMI between 18.5 and 24.9 is considered healthy. · A BMI between 25 and 29.9 is considered overweight. A BMI of 30 or higher is considered obese. If your BMI is in the normal range, it means that you have a lower risk for weight-related health problems. If your BMI is in the overweight or obese range, you may be at increased risk for weight-related health problems, such as high blood pressure, heart disease, stroke, arthritis or joint pain, and diabetes. If your BMI is in the underweight range, you may be at increased risk for health problems such as fatigue, lower protection (immunity) against illness, muscle loss, bone loss, hair loss, and hormone problems. BMI is just one measure of your risk for weight-related health problems. You may be at higher risk for health problems if you are not active, you eat an unhealthy diet, or you drink too much alcohol or use tobacco products. Follow-up care is a key part of your treatment and safety. Be sure to make and go to all appointments, and call your doctor if you are having problems. It's also a good idea to know your test results and keep a list of the medicines you take. How can you care for yourself at home? · Practice healthy eating habits. This includes eating plenty of fruits, vegetables, whole grains, lean protein, and low-fat dairy. · If your doctor recommends it, get more exercise. Walking is a good choice. Bit by bit, increase the amount you walk every day. Try for at least 30 minutes on most days of the week. · Do not smoke. Smoking can increase your risk for health problems.  If you need help quitting, talk to your doctor about stop-smoking programs and medicines. These can increase your chances of quitting for good. · Limit alcohol to 2 drinks a day for men and 1 drink a day for women. Too much alcohol can cause health problems. If you have a BMI higher than 25  · Your doctor may do other tests to check your risk for weight-related health problems. This may include measuring the distance around your waist. A waist measurement of more than 40 inches in men or 35 inches in women can increase the risk of weight-related health problems. · Talk with your doctor about steps you can take to stay healthy or improve your health. You may need to make lifestyle changes to lose weight and stay healthy, such as changing your diet and getting regular exercise. If you have a BMI lower than 18.5  · Your doctor may do other tests to check your risk for health problems. · Talk with your doctor about steps you can take to stay healthy or improve your health. You may need to make lifestyle changes to gain or maintain weight and stay healthy, such as getting more healthy foods in your diet and doing exercises to build muscle. Where can you learn more? Go to https://Bedlooemmanuel.[employer]. org and sign in to your [employer] account. Enter S176 in the vitalclip box to learn more about \"Body Mass Index: Care Instructions. \"     If you do not have an account, please click on the \"Sign Up Now\" link. Current as of: March 17, 2021               Content Version: 13.1  © 0347-5699 Healthwise, Incorporated. Care instructions adapted under license by [facility] ([facility]). If you have questions about a medical condition or this instruction, always ask your healthcare professional. [place] [address] any warranty or liability for your use of this information. Patient Education        High Cholesterol: Care Instructions  Overview     Cholesterol is a type of fat in your blood. It is needed for many body functions, such as making new cells. Cholesterol is made by your body. It also comes from food you eat. High cholesterol means that you have too much of the fat in your blood. This raises your risk of a heart attack and stroke. LDL and HDL are part of your total cholesterol. LDL is the \"bad\" cholesterol. High LDL can raise your risk for coronary artery disease, heart attack, and stroke. HDL is the \"good\" cholesterol. It helps clear bad cholesterol from the body. High HDL is linked with a lower risk of coronary artery disease, heart attack, and stroke. Your cholesterol levels help your doctor find out your risk for having a heart attack or stroke. You and your doctor can talk about whether you need to lower your risk and what treatment is best for you. Treatment options include a heart-healthy lifestyle and medicine. Both options can help lower your cholesterol and your risk. The way you choose to lower your risk will depend on how high your risk is for heart attack and stroke. It will also depend on how you feel about taking medicines. Follow-up care is a key part of your treatment and safety. Be sure to make and go to all appointments, and call your doctor if you are having problems. It's also a good idea to know your test results and keep a list of the medicines you take. How can you care for yourself at home? · Eat heart-healthy foods. ? Eat fruits, vegetables, whole grains, beans, and other high-fiber foods. ? Eat lean proteins, such as seafood, lean meats, beans, nuts, and soy products. ? Eat healthy fats, such as canola and olive oil. ? Choose foods that are low in saturated fat. ? Limit sodium and alcohol. ? Limit drinks and foods with added sugar. · Be physically active. Try to do moderate activity at least 2½ hours a week. Or try to do vigorous activity at least 1¼ hours a week. You may want to walk or try other activities, such as running, swimming, cycling, or playing tennis or team sports.   · Stay at a healthy weight or lose comes from food you eat. Having high cholesterol can lead to the buildup of plaque in artery walls. This can increase your risk of heart attack and stroke. When your doctor talks about high cholesterol levels, your doctor is talking about your total cholesterol and LDL cholesterol (the \"bad\" cholesterol) levels. Your doctor may also speak about HDL (the \"good\" cholesterol) levels. High HDL is linked with a lower risk for coronary artery disease, heart attack, and stroke. Your cholesterol levels help your doctor find out your risk for having a heart attack or stroke. How can you help prevent high cholesterol? A heart-healthy lifestyle can help you prevent high cholesterol and lower your risk for a heart attack and stroke. · Eat heart-healthy foods. ? Eat fruits, vegetables, whole grains, beans, and other high-fiber foods. ? Eat lean proteins, such as seafood, lean meats, beans, nuts, and soy products. ? Eat healthy fats, such as canola and olive oil. ? Choose foods that are low in saturated fat. ? Limit sodium and alcohol. ? Limit drinks and foods with added sugar. · Be active. Try to do moderate activity at least 2½ hours a week. Or try vigorous activity at least 1¼ hours a week. You may want to walk or try other activities, such as running, swimming, cycling, or playing tennis or team sports. · Stay at a healthy weight. Lose weight if you need to. · Don't smoke. If you need help quitting, talk to your doctor about stop-smoking programs and medicines. These can increase your chances of quitting for good. How is high cholesterol treated? The goal of treatment is to reduce your chances of having a heart attack or stroke. The goal is not to lower your cholesterol numbers only. · Have a heart-healthy lifestyle. This includes eating healthy foods, not smoking, losing weight, and being more active. · You may choose to take medicine. Follow-up care is a key part of your treatment and safety.  Be sure to make and go to all appointments, and call your doctor if you are having problems. It's also a good idea to know your test results and keep a list of the medicines you take. Where can you learn more? Go to https://chemmanuel.[employer]. org and sign in to your [employer] account. Enter S886 in the Connect box to learn more about \"Learning About High Cholesterol. \"     If you do not have an account, please click on the \"Sign Up Now\" link. Current as of: April 29, 2021               Content Version: 13.1  © 8079-1372 Healthwise, Incorporated. Care instructions adapted under license by [facility] ([facility]). If you have questions about a medical condition or this instruction, always ask your healthcare professional. [address] any warranty or liability for your use of this information.

## 2022-02-03 ENCOUNTER — TELEPHONE (OUTPATIENT)
Dept: FAMILY MEDICINE CLINIC | Age: 62
End: 2022-02-03

## 2022-02-09 ENCOUNTER — OFFICE VISIT (OUTPATIENT)
Dept: FAMILY MEDICINE CLINIC | Age: 62
End: 2022-02-09
Payer: MEDICARE

## 2022-02-09 VITALS
HEART RATE: 118 BPM | RESPIRATION RATE: 18 BRPM | WEIGHT: 150.3 LBS | OXYGEN SATURATION: 97 % | TEMPERATURE: 98.9 F | HEIGHT: 69 IN | DIASTOLIC BLOOD PRESSURE: 80 MMHG | BODY MASS INDEX: 22.26 KG/M2 | SYSTOLIC BLOOD PRESSURE: 136 MMHG

## 2022-02-09 DIAGNOSIS — F41.9 ANXIETY: Primary | ICD-10-CM

## 2022-02-09 DIAGNOSIS — I10 ESSENTIAL HYPERTENSION: ICD-10-CM

## 2022-02-09 DIAGNOSIS — F51.01 PRIMARY INSOMNIA: ICD-10-CM

## 2022-02-09 DIAGNOSIS — E78.01 FAMILIAL HYPERCHOLESTEROLEMIA: ICD-10-CM

## 2022-02-09 PROCEDURE — 1036F TOBACCO NON-USER: CPT | Performed by: NURSE PRACTITIONER

## 2022-02-09 PROCEDURE — 99213 OFFICE O/P EST LOW 20 MIN: CPT | Performed by: NURSE PRACTITIONER

## 2022-02-09 PROCEDURE — 3017F COLORECTAL CA SCREEN DOC REV: CPT | Performed by: NURSE PRACTITIONER

## 2022-02-09 PROCEDURE — G8427 DOCREV CUR MEDS BY ELIG CLIN: HCPCS | Performed by: NURSE PRACTITIONER

## 2022-02-09 PROCEDURE — G8420 CALC BMI NORM PARAMETERS: HCPCS | Performed by: NURSE PRACTITIONER

## 2022-02-09 PROCEDURE — G8484 FLU IMMUNIZE NO ADMIN: HCPCS | Performed by: NURSE PRACTITIONER

## 2022-02-09 ASSESSMENT — ENCOUNTER SYMPTOMS
VOMITING: 0
COUGH: 0
CONSTIPATION: 0
DIARRHEA: 0
BACK PAIN: 0
TROUBLE SWALLOWING: 0
NAUSEA: 0
SHORTNESS OF BREATH: 0
BLOOD IN STOOL: 0
VOICE CHANGE: 0
WHEEZING: 0
ABDOMINAL PAIN: 0
CHEST TIGHTNESS: 0

## 2022-02-09 NOTE — PROGRESS NOTES
should offer or refer patients with a body mass index (BMI) of 30 kg/m2 or higher to intensive, multicomponent behavioral interventions. (Not a fall risk)  Fall Prevention -- Exercise/Physical Therapy: Community-dwelling Adults 72 Years or Older, Increased Risk for Falls   Grade: B (Recommended) recommends exercise or physical therapy to prevent falls in community-dwelling adults aged 72 years or older who are at increased risk for falls. (No new symptoms noted or reported today)  Depression: Screening -- General adult population, including pregnant and postpartum women  Grade: B(Recommended) recommends screening for depression in the general adult population,  Screening should be implemented with adequate systems in place to ensure accurate diagnosis, effective treatment, and appropriate follow-up. (  ) Glaucoma: Screening - Adults and Diabetic Eye Exam     (  ) Thyroid Dysfunction: Screening --      (  ) Prostate Cancer: Prostate-Specific Antigen (PSA)-Based Screening -- All Men  PSA     (  ) Vitamin D Deficiency: Screening --         Educational materials  printed for patient's review and were included in patient instructions on his After Visit Summary and given to patient at the end of visit. Counseled regarding above diagnosis, including possible risks and complications,  especially if left uncontrolled. Counseled regarding the possible side effects, risks, benefits and alternatives to treatment; patient and/or guardian verbalizes understanding, agrees, feels comfortable with and wishes to proceed with above treatment plan. Advised patient to call with any new medication issues, and read all Rx info from pharmacy to assure aware of all possible risks and side effects of medication before taking. Reviewed age and gender appropriate health screening exams and vaccinations.   Advised patient regarding importance of keeping up with recommended health maintenance and to schedule as soon as possible if overdue, as this is important in assessing for undiagnosed pathology, especially cancer, as well as protecting against potentially harmful/life threatening disease. Patient verbalizes understanding and agrees with above counseling, assessment and plan. All questions answered. On 02/09/22 I have spent 30 reviewing previous notes, test results and face to face with the patient discussing the diagnosis and importance of compliance with the treatment plan as well as documenting on the day of the visit. Educational materials exercises printed for patient's review and were included in patient instructions on their After Visit Summary and given to patient at the end of visit.      No follow-ups on file. Mr. Emili Holguin is a very pleasant 81-year-old white male who is here with his father and was a Dr. Wandy Reagan patient but after she moved he needed to find a new PCP. He states that he is doing pretty well on his medications although he does have episodes where he has trouble sleeping. He told me that he hardly ever takes the trazodone and generally will take the Vistaril for sleep. He presents today for evaluation and management of chronic medical problems. Current medication list reviewed. The patient is tolerating all medications well without adverse events or known side effects. The patient does understand the risk and benefits of the prescribed medications. The patient is not up-to-date on all age-appropriate wellness issues. Patient denies any reccent hospitalizations or ER visit. Acute Complaints reported:      Reporting oil production in excess on the scalp for 5 months. Never has had it before typically has been shaving his head and is now growing it out and it more evident now that the hair is oily. Still having panic attacks at night and his blood pressure goes up. Hypertension  Pertinent negatives include no chest pain, headaches, palpitations or shortness of breath. CHRONIC CONDITION:    HTN: Stable hypertension, controlled on    potassium chloride (KLOR-CON M) 10 MEQ extended release tablet, take 1 tablet by mouth once daily, Disp: 10 tablet, Rfl: 0   hydroCHLOROthiazide (HYDRODIURIL) 25 MG tablet, Take 1 tablet by mouth daily, Disp: 10 tablet, Rfl: 0 remains at a mild intensity but overall good control, without symptoms, no ringing in the ears, no headaches and no nose bleeds. Better on medications. Hyperlipidemia: Mild in intensity but controlled on diet, without symptoms, no complications with dietary treatment regimen reporting no side effects or intolereances. Compliant with treatment and diet. No muscle aches, new joint pains or abd pain. Depression/Anxiety/Insomnia: Stable depression with generalized anxiety. Mild in intensity but well controlled on    busPIRone (BUSPAR) 5 MG tablet, take 1 tablet by mouth twice a day, Disp: 20 tablet, Rfl: 0  traZODone (DESYREL) 100 MG tablet, take 1 tablet by mouth every evening, Disp: 10 tablet, Rfl: 0   hydrOXYzine (VISTARIL) 25 MG capsule, take 1 capsule by mouth three times a day if needed for itching or anxiety, Disp: 90 capsule, Rfl: 0 without symptoms, no weight gain, no increase in anxiety, no suicidal or homicidal ideation's no unexplained fatigue, or relationship difficulties relayed this visit. Review of Systems   Constitutional: Negative for activity change, chills, diaphoresis, fatigue, fever and unexpected weight change. HENT: Negative for trouble swallowing and voice change. Eyes: Negative for visual disturbance. Respiratory: Negative for cough, chest tightness, shortness of breath and wheezing. Cardiovascular: Negative for chest pain, palpitations and leg swelling. Gastrointestinal: Negative for abdominal pain, blood in stool, constipation, diarrhea, nausea and vomiting. Endocrine: Negative for polydipsia, polyphagia and polyuria.    Genitourinary: Negative for dysuria, enuresis, frequency and hematuria. Musculoskeletal: Negative for arthralgias, back pain, gait problem, joint swelling, myalgias and neck stiffness. Skin: Negative for rash. Neurological: Negative for dizziness, seizures, syncope, facial asymmetry, weakness, light-headedness, numbness and headaches. Hematological: Does not bruise/bleed easily. Psychiatric/Behavioral: Negative for behavioral problems, confusion, hallucinations and suicidal ideas. The patient is not nervous/anxious. Current Outpatient Medications:     hydrOXYzine (VISTARIL) 25 MG capsule, take 1 capsule by mouth three times a day if needed for itching or anxiety, Disp: 90 capsule, Rfl: 0    busPIRone (BUSPAR) 10 MG tablet, Take 1 tablet by mouth 2 times daily, Disp: 60 tablet, Rfl: 0    potassium chloride (KLOR-CON M) 10 MEQ extended release tablet, take 1 tablet by mouth once daily, Disp: 10 tablet, Rfl: 0    hydroCHLOROthiazide (HYDRODIURIL) 25 MG tablet, Take 1 tablet by mouth daily, Disp: 10 tablet, Rfl: 0    traZODone (DESYREL) 100 MG tablet, take 1 tablet by mouth every evening (Patient not taking: Reported on 2/9/2022), Disp: 10 tablet, Rfl: 0  No Known Allergies    Past Medical History:   Diagnosis Date    Anxiety      No past surgical history on file.   Family History   Problem Relation Age of Onset    Hypertension Father      Social History     Socioeconomic History    Marital status: Single     Spouse name: Not on file    Number of children: Not on file    Years of education: Not on file    Highest education level: Not on file   Occupational History    Not on file   Tobacco Use    Smoking status: Never Smoker    Smokeless tobacco: Never Used   Vaping Use    Vaping Use: Never used   Substance and Sexual Activity    Alcohol use: Never    Drug use: Never    Sexual activity: Not on file   Other Topics Concern    Not on file   Social History Narrative    Not on file     Social Determinants of Health     Financial Resource Strain: Low Risk     Difficulty of Paying Living Expenses: Not hard at all   Food Insecurity: No Food Insecurity    Worried About Running Out of Food in the Last Year: Never true    Johnathan of Food in the Last Year: Never true   Transportation Needs:     Lack of Transportation (Medical): Not on file    Lack of Transportation (Non-Medical): Not on file   Physical Activity: Inactive    Days of Exercise per Week: 0 days    Minutes of Exercise per Session: 0 min   Stress:     Feeling of Stress : Not on file   Social Connections:     Frequency of Communication with Friends and Family: Not on file    Frequency of Social Gatherings with Friends and Family: Not on file    Attends [employer] Services: Not on file    Active Member of Clubs or Organizations: Not on file    Attends Club or Organization Meetings: Not on file    Marital Status: Not on file   Intimate Partner Violence: Not At Risk    Fear of Current or Ex-Partner: No    Emotionally Abused: No    Physically Abused: No    Sexually Abused: No   Housing Stability:     Unable to Pay for Housing in the Last Year: Not on file    Number of [place] in the Last Year: Not on file    Unstable Housing in the Last Year: Not on file       Vitals:    02/09/22 1324   BP: 136/80   Site: Left Upper Arm   Position: Sitting   Cuff Size: Medium Adult   Pulse: 118   Resp: 18   Temp: 98.9 °F (37.2 °C)   TempSrc: Temporal   SpO2: 97%   Weight: 150 lb 4.8 oz (68.2 kg)   Height: 5' 9\" (1.753 m)       Physical Exam  Vitals and nursing note reviewed. Constitutional:       Appearance: Normal appearance. HENT:      Head: Normocephalic. Right Ear: Tympanic membrane and ear canal normal. There is no impacted cerumen. Left Ear: Tympanic membrane and ear canal normal. There is no impacted cerumen. Nose: Nose normal.      Mouth/Throat:      Mouth: Mucous membranes are dry. Eyes:      Extraocular Movements: Extraocular movements intact. Pupils: Pupils are equal, round, and reactive to light. Neck:      Vascular: No carotid bruit. Cardiovascular:      Rate and Rhythm: Normal rate and regular rhythm. Pulses: Normal pulses. Heart sounds: Normal heart sounds. No murmur heard. No friction rub. No gallop. Pulmonary:      Effort: Pulmonary effort is normal. No respiratory distress. Breath sounds: Normal breath sounds. No stridor. No wheezing, rhonchi or rales. Chest:      Chest wall: No tenderness. Abdominal:      General: Bowel sounds are normal. There is no distension. Palpations: Abdomen is soft. Musculoskeletal:         General: No swelling, tenderness, deformity or signs of injury. Cervical back: No rigidity. No muscular tenderness. Right lower leg: No edema. Left lower leg: No edema. Lymphadenopathy:      Cervical: No cervical adenopathy. Skin:     General: Skin is warm and dry. Capillary Refill: Capillary refill takes 2 to 3 seconds. Findings: No bruising, lesion or rash. Neurological:      General: No focal deficit present. Mental Status: He is alert and oriented to person, place, and time. Motor: No weakness. Gait: Gait normal.   Psychiatric:         Attention and Perception: Attention normal.         Mood and Affect: Mood normal.         Behavior: Behavior normal.         Thought Content: Thought content does not include homicidal or suicidal ideation. Thought content does not include homicidal or suicidal plan.                 Seen By:  MALACHI Edwards - CNP

## 2022-02-09 NOTE — PATIENT INSTRUCTIONS
Patient Education        Body Mass Index: Care Instructions  Your Care Instructions     Body mass index (BMI) can help you see if your weight is raising your risk for health problems. It uses a formula to compare how much you weigh with how tall you are. · A BMI lower than 18.5 is considered underweight. · A BMI between 18.5 and 24.9 is considered healthy. · A BMI between 25 and 29.9 is considered overweight. A BMI of 30 or higher is considered obese. If your BMI is in the normal range, it means that you have a lower risk for weight-related health problems. If your BMI is in the overweight or obese range, you may be at increased risk for weight-related health problems, such as high blood pressure, heart disease, stroke, arthritis or joint pain, and diabetes. If your BMI is in the underweight range, you may be at increased risk for health problems such as fatigue, lower protection (immunity) against illness, muscle loss, bone loss, hair loss, and hormone problems. BMI is just one measure of your risk for weight-related health problems. You may be at higher risk for health problems if you are not active, you eat an unhealthy diet, or you drink too much alcohol or use tobacco products. Follow-up care is a key part of your treatment and safety. Be sure to make and go to all appointments, and call your doctor if you are having problems. It's also a good idea to know your test results and keep a list of the medicines you take. How can you care for yourself at home? · Practice healthy eating habits. This includes eating plenty of fruits, vegetables, whole grains, lean protein, and low-fat dairy. · If your doctor recommends it, get more exercise. Walking is a good choice. Bit by bit, increase the amount you walk every day. Try for at least 30 minutes on most days of the week. · Do not smoke. Smoking can increase your risk for health problems.  If you need help quitting, talk to your doctor about stop-smoking programs and medicines. These can increase your chances of quitting for good. · Limit alcohol to 2 drinks a day for men and 1 drink a day for women. Too much alcohol can cause health problems. If you have a BMI higher than 25  · Your doctor may do other tests to check your risk for weight-related health problems. This may include measuring the distance around your waist. A waist measurement of more than 40 inches in men or 35 inches in women can increase the risk of weight-related health problems. · Talk with your doctor about steps you can take to stay healthy or improve your health. You may need to make lifestyle changes to lose weight and stay healthy, such as changing your diet and getting regular exercise. If you have a BMI lower than 18.5  · Your doctor may do other tests to check your risk for health problems. · Talk with your doctor about steps you can take to stay healthy or improve your health. You may need to make lifestyle changes to gain or maintain weight and stay healthy, such as getting more healthy foods in your diet and doing exercises to build muscle. Where can you learn more? Go to [email or web address].[email or web address] org and sign in to your [employer] account. Enter S176 in the [employer] box to learn more about \"Body Mass Index: Care Instructions. \"     If you do not have an account, please click on the \"Sign Up Now\" link. Current as of: March 17, 2021               Content Version: 13.1  © 9212-7918 Healthwise, [employer]orporated. Care instructions adapted under license by [facility] ([facility]). If you have questions about a medical condition or this instruction, always ask your healthcare professional. [place] [address] any warranty or liability for your use of this information. Patient Education        Insomnia: Care Instructions  Overview     Insomnia is the inability to sleep well.  Insomnia may make it hard for you to get to sleep, stay asleep, or sleep as long as you need to. This can make you tired and grouchy during the day. It can also make you forgetful, less effective at work, and unhappy. Insomnia can be linked to many things. These include health problems, medicines, and stressful events. Treatment may include treating problems that may be linked with your insomnia. Treatment also includes behavior and lifestyle changes. This may include cognitive-behavioral therapy for insomnia (CBT-I). CBT-I uses different ways to help you change your thoughts and behaviors that may interfere with sleep. Your doctor can recommend specific things you can try. Examples include doing relaxation exercises, keeping regular bedtimes and wake times, limiting alcohol, and making healthy sleep habits. Some people decide to take medicine for a while to help with sleep. Follow-up care is a key part of your treatment and safety. Be sure to make and go to all appointments, and call your doctor if you are having problems. It's also a good idea to know your test results and keep a list of the medicines you take. How can you care for yourself at home? Cognitive-behavioral therapy for insomnia (CBT-I)  · If your doctor recommends CBT-I, follow your treatment plan. Your doctor will give you instructions that are unique for you. · Your plan will likely include a few things that you can try at home. For example:  ? Try meditation or other relaxation techniques before you go to bed. ? Go to bed at the same time every night, and wake up at the same time every morning. Do not take naps during the day. ? Do not stay in bed awake for too long. If you can't fall asleep, or if you wake up in the middle of the night and can't get back to sleep within about 15 to 20 minutes, get out of bed and go to another room until you feel sleepy. ? If watching the clock makes you anxious, turn it facing away from you so you cannot see the time. ? If you worry when you lie down, start a worry book.  Well before bedtime, write down your worries, and then set the book and your concerns aside. Healthy sleep habits  · If your doctor recommends it, try making healthy sleep habits. For example:  ? Keep your bedroom quiet, dark, and cool. ? Do not have drinks with caffeine, such as coffee or black tea, for 8 hours before bed. ? Do not smoke or use other types of tobacco near bedtime. Nicotine is a stimulant and can keep you awake. ? Avoid drinking alcohol late in the evening, because it can cause you to wake in the middle of the night. ? Do not eat a big meal close to bedtime. If you are hungry, eat a light snack. ? Do not drink a lot of water close to bedtime, because the need to urinate may wake you up during the night. ? Do not read, watch TV, or use your phone in bed. Use the bed only for sleeping and sex. Medicine  · Be safe with medicines. Take your medicines exactly as prescribed. Call your doctor if you think you are having a problem with your medicine. · Talk with your doctor before you try an over-the-counter medicine, herbal product, or supplement to try to improve your sleep. Your doctor can recommend how much to take and when to take it. Make sure your doctor knows all of the medicines, vitamins, herbal products, and supplements you take. · You will get more details on the specific medicines your doctor prescribes. When should you call for help? Watch closely for changes in your health, and be sure to contact your doctor if:    · Your efforts to improve your sleep do not work.     · Your insomnia gets worse.     · You have been feeling down, depressed, or hopeless or have lost interest in things that you usually enjoy. Where can you learn more? Go to [email or web address]://[email or web address] org and sign in to your [employer] account. Enter P513 in the [employer] box to learn more about \"Insomnia: Care Instructions. \"     If you do not have an account, please click on the \"Sign Up Now\" link.  Current as of: June 16, 2021               Content Version: 13.1  © 2006-2021 [employer]. Care instructions adapted under license by [facility] ([facility]). If you have questions about a medical condition or this instruction, always ask your healthcare professional. [address] any warranty or liability for your use of this information. Patient Education        Learning About High Blood Pressure  What is high blood pressure? Blood pressure is a measure of how hard the blood pushes against the walls of your arteries. It's normal for blood pressure to go up and down throughout the day. But if it stays up, you have high blood pressure. Another name for high blood pressure is hypertension. Two numbers tell you your blood pressure. The first number is the systolic pressure (top number). It shows how hard the blood pushes when your heart is pumping. The second number is the diastolic pressure (bottom number). It shows how hard the blood pushes between heartbeats, when your heart is relaxed and filling with blood. Your doctor will give you a goal for your blood pressure based on your health and your age. High blood pressure (hypertension) means that the top number stays high, or the bottom number stays high, or both. High blood pressure increases the risk of stroke, heart attack, and other problems. What happens when you have high blood pressure? · Blood flows through your arteries with too much force. Over time, this can damage the heart and the walls of your arteries. But you can't feel it. High blood pressure usually doesn't cause symptoms. · High blood pressure makes your heart work harder. And that can lead to heart failure, which means your heart doesn't pump as much blood as your body needs. · Fat and calcium start to build up in your arteries. This buildup is called hardening of the arteries.  It can cause many problems including a heart attack and stroke. · Arteries also carry blood and oxygen to organs like your eyes, kidneys, and brain. If high blood pressure damages those arteries, it can lead to vision loss, kidney disease, stroke, and a higher risk of dementia. How can you prevent high blood pressure? · Stay at a healthy weight. · Try to limit how much sodium you eat to less than 2,300 milligrams (mg) a day. If you limit your sodium to 1,500 mg a day, you can lower your blood pressure even more. ? Buy foods that are labeled \"unsalted,\" \"sodium-free,\" or \"low-sodium. \" Foods labeled \"reduced-sodium\" and \"light sodium\" may still have too much sodium. ? Flavor your food with garlic, lemon juice, onion, vinegar, herbs, and spices instead of salt. Do not use soy sauce, steak sauce, onion salt, garlic salt, mustard, or ketchup on your food. ? Use less salt (or none) when recipes call for it. You can often use half the salt a recipe calls for without losing flavor. · Be physically active. Get at least 30 minutes of exercise on most days of the week. Walking is a good choice. You also may want to do other activities, such as running, swimming, cycling, or playing tennis or team sports. · Limit alcohol to 2 drinks a day for men and 1 drink a day for women. · Eat plenty of fruits, vegetables, and low-fat dairy products. Eat less saturated and total fats. How is high blood pressure treated? · Your doctor will suggest making lifestyle changes to help your heart. For example, your doctor may ask you to eat healthy foods, quit smoking, lose extra weight, and be more active. · If lifestyle changes don't help enough, your doctor may recommend that you take medicine. · When blood pressure is very high, medicines are needed to lower it. Follow-up care is a key part of your treatment and safety. Be sure to make and go to all appointments, and call your doctor if you are having problems.  It's also a good idea to know your test results and keep a list of the medicines you take. Where can you learn more? Go to [email or web address]://[email or web address] and sign in to your [employer] account. Enter P501 in the [facility] box to learn more about \"Learning About High Blood Pressure. \"     If you do not have an account, please click on the \"Sign Up Now\" link. Current as of: April 29, 2021               Content Version: 13.1  © 2006-2021 Healthwise, Incorporated. Care instructions adapted under license by [facility] ([facility]). If you have questions about a medical condition or this instruction, always ask your healthcare professional. [address] any warranty or liability for your use of this information.

## 2022-02-09 NOTE — PROGRESS NOTES
Selina Sample : 1960 Sex: male  Age: 65 y.o. Chief Complaint   Patient presents with    Hypertension    Medication Adjustment     waking up at night and having a panic attack       Assessment and Plan:    There are no diagnoses linked to this encounter. USPTF:    () Abnormal Blood Glucose and Type 2 Diabetes Mellitus: Screening -- Adults aged 36 to 79 years who are overweight or obese Grade: B (Recommended)    (B/P 130/80) High Blood Pressure: Screening and Home Monitoring -- Adults  Grade: A (Recommended) recommends screening for high blood pressure in ages 25 years or older. obtain measurements outside of the clinical setting for diagnostic confirmation before starting treatment. Annual screening for adults aged 36 years or older or those who are at increased risk for blood pressure    (  ) Colorectal Cancer: Screening --Adults aged 48 to 76 years  Grade: A (Recommended) recommends screening for colorectal cancer starting at age 48 years and continuing until age 76 years. (,  )  Lipid Disorders in Adults: Screening -- Men 28 and Older  Grade: A (Recommended) recommends screening men aged 28 and older for lipid disorders. (Non Drinker) Alcohol Misuse: Screening and Behavioral Counseling Interventions in Primary Care -- Adults  Grade: B (Recommended) recommends that clinicians screen adults aged 25 years or older for alcohol misuse and provide persons engaged in risky or hazardous drinking with brief behavioral counseling interventions to reduce alcohol misuse. (  ) Abdominal Aortic Aneurysm: Screening -- Men Ages 72 to 76 Years Who Have Ever Smoked. Grade: B (Recommended) recommends one-time screening for abdominal aortic aneurysm (AAA) with ultrasonography in men ages 72 to 76 years who have ever smoked. (BMI 22.70)  Obesity: Screening for and Management of-- All Adults  Grade: B(Recommended) recommends screening all adults for obesity.  Clinicians should offer or refer patients with a body mass index (BMI) of 30 kg/m2 or higher to intensive, multicomponent behavioral interventions. (Not a fall risk)  Fall Prevention -- Exercise/Physical Therapy: Community-dwelling Adults 72 Years or Older, Increased Risk for Falls   Grade: B (Recommended) recommends exercise or physical therapy to prevent falls in community-dwelling adults aged 72 years or older who are at increased risk for falls. (No new symptoms noted or reported today)  Depression: Screening -- General adult population, including pregnant and postpartum women  Grade: B(Recommended) recommends screening for depression in the general adult population,  Screening should be implemented with adequate systems in place to ensure accurate diagnosis, effective treatment, and appropriate follow-up. (  ) Glaucoma: Screening - Adults and Diabetic Eye Exam     (  ) Thyroid Dysfunction: Screening --      (  ) Prostate Cancer: Prostate-Specific Antigen (PSA)-Based Screening -- All Men  PSA     (  ) Vitamin D Deficiency: Screening --         Educational materials  printed for patient's review and were included in patient instructions on his After Visit Summary and given to patient at the end of visit. Counseled regarding above diagnosis, including possible risks and complications,  especially if left uncontrolled. Counseled regarding the possible side effects, risks, benefits and alternatives to treatment; patient and/or guardian verbalizes understanding, agrees, feels comfortable with and wishes to proceed with above treatment plan. Advised patient to call with any new medication issues, and read all Rx info from pharmacy to assure aware of all possible risks and side effects of medication before taking. Reviewed age and gender appropriate health screening exams and vaccinations.   Advised patient regarding importance of keeping up with recommended health maintenance and to schedule as soon side effects. The patient does understand the risk and benefits of the prescribed medications. The patient is not up-to-date on all age-appropriate wellness issues. Patient denies any reccent hospitalizations or ER visit. Acute Complaints reported:      Reporting oil production in excess on the scalp for 5 months. Never has had it before typically has been shaving his head and is now growing it out and it more evident now that the hair is oily. Still having panic attacks at night and his blood pressure goes up. Very is very anxious or good    Hypertension  Pertinent negatives include no chest pain, headaches, palpitations or shortness of breath. CHRONIC CONDITION:    HTN: Stable hypertension, controlled on    potassium chloride (KLOR-CON M) 10 MEQ extended release tablet, take 1 tablet by mouth once daily, Disp: 10 tablet, Rfl: 0   hydroCHLOROthiazide (HYDRODIURIL) 25 MG tablet, Take 1 tablet by mouth daily, Disp: 10 tablet, Rfl: 0 remains at a mild intensity but overall good control, without symptoms, no ringing in the ears, no headaches and no nose bleeds. Better on medications. Hyperlipidemia: Mild in intensity but controlled on diet, without symptoms, no complications with dietary treatment regimen reporting no side effects or intolereances. Compliant with treatment and diet. No muscle aches, new joint pains or abd pain. Depression/Anxiety/Insomnia: Stable depression with generalized anxiety.  Mild in intensity but well controlled on    busPIRone (BUSPAR) 5 MG tablet, take 1 tablet by mouth twice a day, Disp: 20 tablet, Rfl: 0  traZODone (DESYREL) 100 MG tablet, take 1 tablet by mouth every evening, Disp: 10 tablet, Rfl: 0   hydrOXYzine (VISTARIL) 25 MG capsule, take 1 capsule by mouth three times a day if needed for itching or anxiety, Disp: 90 capsule, Rfl: 0 without symptoms, no weight gain, no increase in anxiety, no suicidal or homicidal ideation's no unexplained fatigue, or relationship difficulties relayed this visit. Review of Systems   Constitutional: Negative for activity change, chills, diaphoresis, fatigue, fever and unexpected weight change. HENT: Negative for trouble swallowing and voice change. Eyes: Negative for visual disturbance. Respiratory: Negative for cough, chest tightness, shortness of breath and wheezing. Cardiovascular: Negative for chest pain, palpitations and leg swelling. Gastrointestinal: Negative for abdominal pain, blood in stool, constipation, diarrhea, nausea and vomiting. Endocrine: Negative for polydipsia, polyphagia and polyuria. Genitourinary: Negative for dysuria, enuresis, frequency and hematuria. Musculoskeletal: Negative for arthralgias, back pain, gait problem, joint swelling, myalgias and neck stiffness. Skin: Negative for rash. Neurological: Negative for dizziness, seizures, syncope, facial asymmetry, weakness, light-headedness, numbness and headaches. Hematological: Does not bruise/bleed easily. Psychiatric/Behavioral: Negative for behavioral problems, confusion, hallucinations and suicidal ideas. The patient is not nervous/anxious. Current Outpatient Medications:     hydrOXYzine (VISTARIL) 25 MG capsule, take 1 capsule by mouth three times a day if needed for itching or anxiety, Disp: 90 capsule, Rfl: 0    busPIRone (BUSPAR) 10 MG tablet, Take 1 tablet by mouth 2 times daily, Disp: 60 tablet, Rfl: 0    potassium chloride (KLOR-CON M) 10 MEQ extended release tablet, take 1 tablet by mouth once daily, Disp: 10 tablet, Rfl: 0    hydroCHLOROthiazide (HYDRODIURIL) 25 MG tablet, Take 1 tablet by mouth daily, Disp: 10 tablet, Rfl: 0    traZODone (DESYREL) 100 MG tablet, take 1 tablet by mouth every evening (Patient not taking: Reported on 2/9/2022), Disp: 10 tablet, Rfl: 0  No Known Allergies    Past Medical History:   Diagnosis Date    Anxiety      History reviewed. No pertinent surgical history.   Family History   Problem Relation Age of Onset    Hypertension Father      Social History     Socioeconomic History    Marital status: Single     Spouse name: Not on file    Number of children: Not on file    Years of education: Not on file    Highest education level: Not on file   Occupational History    Not on file   Tobacco Use    Smoking status: Never Smoker    Smokeless tobacco: Never Used   Vaping Use    Vaping Use: Never used   Substance and Sexual Activity    Alcohol use: Never    Drug use: Never    Sexual activity: Not on file   Other Topics Concern    Not on file   Social History Narrative    Not on file     Social Determinants of Health     Financial Resource Strain: Low Risk     Difficulty of Paying Living Expenses: Not hard at all   Food Insecurity: No Food Insecurity    Worried About [address] in the Last Year: Never true    [address] [employer] in the Last Year: Never true   Transportation Needs:     Lack of Transportation (Medical): Not on file    Lack of Transportation (Non-Medical):  Not on file   Physical Activity: Inactive    Days of Exercise per Week: 0 days    Minutes of Exercise per Session: 0 min   Stress:     Feeling of Stress : Not on file   Social Connections:     Frequency of Communication with Friends and Family: Not on file    Frequency of Social Gatherings with Friends and Family: Not on file    Attends Yarsani Services: Not on file    Active Member of Clubs or Organizations: Not on file    Attends Club or Organization Meetings: Not on file    Marital Status: Not on file   Intimate Partner Violence: Not At Risk    Fear of Current or Ex-Partner: No    Emotionally Abused: No    Physically Abused: No    Sexually Abused: No   Housing Stability:     Unable to Pay for Housing in the Last Year: Not on file    Number of [place] in the Last Year: Not on file    Unstable Housing in the Last Year: Not on file       Vitals:    02/09/22 1324   BP: 136/80 Site: Left Upper Arm   Position: Sitting   Cuff Size: Medium Adult   Pulse: 118   Resp: 18   Temp: 98.9 °F (37.2 °C)   TempSrc: Temporal   SpO2: 97%   Weight: 150 lb 4.8 oz (68.2 kg)   Height: 5' 9\" (1.753 m)       Physical Exam  Vitals and nursing note reviewed. Constitutional:       Appearance: Normal appearance. HENT:      Head: Normocephalic. Right Ear: Tympanic membrane and ear canal normal. There is no impacted cerumen. Left Ear: Tympanic membrane and ear canal normal. There is no impacted cerumen. Nose: Nose normal.      Mouth/Throat:      Mouth: Mucous membranes are dry. Eyes:      Extraocular Movements: Extraocular movements intact. Pupils: Pupils are equal, round, and reactive to light. Neck:      Vascular: No carotid bruit. Cardiovascular:      Rate and Rhythm: Normal rate and regular rhythm. Pulses: Normal pulses. Heart sounds: Normal heart sounds. No murmur heard. No friction rub. No gallop. Pulmonary:      Effort: Pulmonary effort is normal. No respiratory distress. Breath sounds: Normal breath sounds. No stridor. No wheezing, rhonchi or rales. Chest:      Chest wall: No tenderness. Abdominal:      General: Bowel sounds are normal. There is no distension. Palpations: Abdomen is soft. Musculoskeletal:         General: No swelling, tenderness, deformity or signs of injury. Cervical back: No rigidity. No muscular tenderness. Right lower leg: No edema. Left lower leg: No edema. Lymphadenopathy:      Cervical: No cervical adenopathy. Skin:     General: Skin is warm and dry. Capillary Refill: Capillary refill takes 2 to 3 seconds. Findings: No bruising, lesion or rash. Neurological:      General: No focal deficit present. Mental Status: He is alert and oriented to person, place, and time. Motor: No weakness.       Gait: Gait normal.   Psychiatric:         Attention and Perception: Attention normal. Mood and Affect: Mood normal.         Behavior: Behavior normal.         Thought Content: Thought content does not include homicidal or suicidal ideation. Thought content does not include homicidal or suicidal plan.                 Seen By:  Vertis Severe, APRN - YISSEL

## 2022-02-17 ENCOUNTER — TELEPHONE (OUTPATIENT)
Dept: FAMILY MEDICINE CLINIC | Age: 62
End: 2022-02-17

## 2022-02-17 NOTE — TELEPHONE ENCOUNTER
I called \"Crista\" her company does testing when pt. Is on meds for depression, anxiety, mood disorders, Psychotropic meds. to see what meds pt. would most respond to. Isai Lopez states this pt. [facility] thru the internet. He asked for an application form. Next step is to inform his doctor to see if he can get the order to have this testing done. I called the pt., had to leave message on home and cell numbers to call me back.

## 2022-02-17 NOTE — TELEPHONE ENCOUNTER
----- Message from [facility] sent at 2/16/2022  4:49 PM EST -----  Subject: Message to Provider    QUESTIONS  Information for Provider? Crista from [employer] called stating that she   wants to come and do a walk through about testing and if the pcp is   interested and doing this type of testing with the patient at this time. [place] provides specific testing with their own specific testing   machines, and labs. Please advise. Please call Crista back. ---------------------------------------------------------------------------  --------------  Angélica PÉREZ  What is the best way for the office to contact you? OK to leave message on   voicemail  Preferred Call Back Phone Number? [phone]  ---------------------------------------------------------------------------  --------------  SCRIPT ANSWERS  Relationship to Patient? Third Party  Representative Name?  Anglea Thomas

## 2022-02-19 DIAGNOSIS — I10 ESSENTIAL HYPERTENSION: ICD-10-CM

## 2022-02-21 RX ORDER — HYDROCHLOROTHIAZIDE 25 MG/1
TABLET ORAL
Qty: 10 TABLET | Refills: 0 | OUTPATIENT
Start: 2022-02-21

## 2022-02-21 RX ORDER — POTASSIUM CHLORIDE 750 MG/1
TABLET, EXTENDED RELEASE ORAL
Qty: 10 TABLET | Refills: 0 | OUTPATIENT
Start: 2022-02-21

## 2022-02-21 NOTE — TELEPHONE ENCOUNTER
Last Appointment:  1/21/2021  Future Appointments   Date Time Provider Tyshawn Luo   4/27/2022  2:00 PM MALACHI [facility]zada - [facility]

## 2022-02-23 NOTE — TELEPHONE ENCOUNTER
Called Derek's number and Krista's number, had to leave message on voice mails to call back about a blood test Kiersten Adjutant is interested in.

## 2022-02-28 DIAGNOSIS — F41.9 ANXIETY: ICD-10-CM

## 2022-02-28 DIAGNOSIS — I10 ESSENTIAL HYPERTENSION: ICD-10-CM

## 2022-02-28 NOTE — TELEPHONE ENCOUNTER
Patients last appointment 2/9/2022.   Patients next scheduled appointment   Future Appointments   Date Time Provider Tyshawn Luo   4/27/2022  2:00 PM MALACHI Delgado CNP [facility]

## 2022-03-01 DIAGNOSIS — I10 ESSENTIAL HYPERTENSION: ICD-10-CM

## 2022-03-01 DIAGNOSIS — F41.9 ANXIETY: ICD-10-CM

## 2022-03-01 RX ORDER — HYDROXYZINE PAMOATE 25 MG/1
CAPSULE ORAL
Qty: 90 CAPSULE | Refills: 0 | Status: SHIPPED
Start: 2022-03-01 | End: 2022-03-16 | Stop reason: SDUPTHER

## 2022-03-01 RX ORDER — HYDROXYZINE PAMOATE 25 MG/1
CAPSULE ORAL
Qty: 90 CAPSULE | Refills: 0 | OUTPATIENT
Start: 2022-03-01

## 2022-03-01 NOTE — TELEPHONE ENCOUNTER
Patients last appointment 2/9/2022.   Patients next scheduled appointment   Future Appointments   Date Time Provider Tyshawn Luo   4/27/2022  2:00 PM MALACHI Espinosa CNP [facility]

## 2022-03-02 RX ORDER — POTASSIUM CHLORIDE 750 MG/1
TABLET, EXTENDED RELEASE ORAL
Qty: 10 TABLET | Refills: 0 | Status: SHIPPED
Start: 2022-03-02 | End: 2022-04-18 | Stop reason: SDUPTHER

## 2022-03-02 RX ORDER — HYDROCHLOROTHIAZIDE 25 MG/1
25 TABLET ORAL DAILY
Qty: 10 TABLET | Refills: 0 | Status: SHIPPED
Start: 2022-03-02 | End: 2022-05-19 | Stop reason: SDUPTHER

## 2022-03-02 RX ORDER — HYDROCHLOROTHIAZIDE 25 MG/1
25 TABLET ORAL DAILY
Qty: 10 TABLET | Refills: 0 | OUTPATIENT
Start: 2022-03-02

## 2022-03-02 RX ORDER — POTASSIUM CHLORIDE 750 MG/1
TABLET, EXTENDED RELEASE ORAL
Qty: 10 TABLET | Refills: 0 | OUTPATIENT
Start: 2022-03-02

## 2022-03-02 NOTE — TELEPHONE ENCOUNTER
Last Appointment:  1/21/2021  Future Appointments   Date Time Provider Tyshawn Luo   4/27/2022  2:00 PM MALACHI Kelsey - CNP [facility]

## 2022-03-07 DIAGNOSIS — I10 ESSENTIAL HYPERTENSION: ICD-10-CM

## 2022-03-07 RX ORDER — POTASSIUM CHLORIDE 750 MG/1
TABLET, EXTENDED RELEASE ORAL
Qty: 10 TABLET | Refills: 0 | OUTPATIENT
Start: 2022-03-07

## 2022-03-07 RX ORDER — HYDROCHLOROTHIAZIDE 25 MG/1
TABLET ORAL
Qty: 10 TABLET | Refills: 0 | OUTPATIENT
Start: 2022-03-07

## 2022-03-07 RX ORDER — BUSPIRONE HYDROCHLORIDE 10 MG/1
10 TABLET ORAL 2 TIMES DAILY
Qty: 60 TABLET | Refills: 5 | Status: SHIPPED
Start: 2022-03-07 | End: 2022-03-16 | Stop reason: SDUPTHER

## 2022-03-07 NOTE — TELEPHONE ENCOUNTER
Patients last appointment 2/9/2022.   Patients next scheduled appointment   Future Appointments   Date Time Provider Tyshawn Luo   4/27/2022  2:00 PM MALACHI Ham - YISSEL SALINAS [facility]

## 2022-03-16 DIAGNOSIS — I10 ESSENTIAL HYPERTENSION: ICD-10-CM

## 2022-03-16 DIAGNOSIS — F41.9 ANXIETY: ICD-10-CM

## 2022-03-16 RX ORDER — HYDROXYZINE PAMOATE 25 MG/1
CAPSULE ORAL
Qty: 90 CAPSULE | Refills: 0 | Status: SHIPPED
Start: 2022-03-16 | End: 2022-04-26 | Stop reason: SDUPTHER

## 2022-03-16 RX ORDER — POTASSIUM CHLORIDE 750 MG/1
TABLET, EXTENDED RELEASE ORAL
Qty: 10 TABLET | Refills: 0 | OUTPATIENT
Start: 2022-03-16

## 2022-03-16 RX ORDER — BUSPIRONE HYDROCHLORIDE 10 MG/1
10 TABLET ORAL 2 TIMES DAILY
Qty: 60 TABLET | Refills: 5 | Status: SHIPPED
Start: 2022-03-16 | End: 2022-05-19 | Stop reason: SDUPTHER

## 2022-03-16 RX ORDER — POTASSIUM CHLORIDE 750 MG/1
TABLET, EXTENDED RELEASE ORAL
Qty: 10 TABLET | Refills: 0 | Status: CANCELLED | OUTPATIENT
Start: 2022-03-16

## 2022-03-16 RX ORDER — HYDROCHLOROTHIAZIDE 25 MG/1
25 TABLET ORAL DAILY
Qty: 10 TABLET | Refills: 0 | Status: CANCELLED | OUTPATIENT
Start: 2022-03-16

## 2022-03-17 RX ORDER — BUSPIRONE HYDROCHLORIDE 5 MG/1
TABLET ORAL
Qty: 20 TABLET | Refills: 0 | OUTPATIENT
Start: 2022-03-17

## 2022-03-23 ENCOUNTER — TELEPHONE (OUTPATIENT)
Dept: FAMILY MEDICINE CLINIC | Age: 62
End: 2022-03-23

## 2022-03-23 RX ORDER — ESCITALOPRAM OXALATE 10 MG/1
10 TABLET ORAL DAILY
Qty: 30 TABLET | Refills: 5 | Status: SHIPPED | OUTPATIENT
Start: 2022-03-23

## 2022-03-23 NOTE — TELEPHONE ENCOUNTER
Pt. Is asking if he can go back on \"Lexapro\" and stop the Hydroxyzine? Hydroxyzine is making him dizzy. Lexapro did work for him before. [facility] is [facility].

## 2022-03-23 NOTE — TELEPHONE ENCOUNTER
That will be fine but tell him it is going to take about 4 to 6 weeks for the Lexapro to build up in his system so do not be too quick to stop the hydroxyzine may be cut back on it a little

## 2022-04-18 RX ORDER — POTASSIUM CHLORIDE 750 MG/1
TABLET, EXTENDED RELEASE ORAL
Qty: 10 TABLET | Refills: 0 | OUTPATIENT
Start: 2022-04-18

## 2022-04-18 RX ORDER — POTASSIUM CHLORIDE 750 MG/1
TABLET, EXTENDED RELEASE ORAL
Qty: 30 TABLET | Refills: 5 | Status: SHIPPED
Start: 2022-04-18 | End: 2022-09-20 | Stop reason: SDUPTHER

## 2022-04-18 NOTE — TELEPHONE ENCOUNTER
Patients last appointment 2/9/2022.   Patients next scheduled appointment   Future Appointments   Date Time Provider Tyshawn Luo   5/5/2022  3:00 PM MALACHI Austin CNP [facility]

## 2022-04-26 DIAGNOSIS — F41.9 ANXIETY: ICD-10-CM

## 2022-04-27 RX ORDER — HYDROXYZINE PAMOATE 25 MG/1
CAPSULE ORAL
Qty: 90 CAPSULE | Refills: 0 | Status: SHIPPED
Start: 2022-04-27 | End: 2022-05-19 | Stop reason: SDUPTHER

## 2022-05-19 DIAGNOSIS — I10 ESSENTIAL HYPERTENSION: ICD-10-CM

## 2022-05-19 DIAGNOSIS — F41.9 ANXIETY: ICD-10-CM

## 2022-05-20 RX ORDER — HYDROCHLOROTHIAZIDE 25 MG/1
25 TABLET ORAL DAILY
Qty: 30 TABLET | Refills: 5 | Status: SHIPPED
Start: 2022-05-20 | End: 2022-09-20 | Stop reason: SDUPTHER

## 2022-05-20 RX ORDER — BUSPIRONE HYDROCHLORIDE 10 MG/1
10 TABLET ORAL 2 TIMES DAILY
Qty: 60 TABLET | Refills: 5 | Status: SHIPPED
Start: 2022-05-20 | End: 2022-06-27 | Stop reason: SDUPTHER

## 2022-05-20 RX ORDER — HYDROXYZINE PAMOATE 25 MG/1
CAPSULE ORAL
Qty: 90 CAPSULE | Refills: 0 | Status: SHIPPED
Start: 2022-05-20 | End: 2022-06-27 | Stop reason: SDUPTHER

## 2022-05-20 NOTE — TELEPHONE ENCOUNTER
Patients last appointment 2/9/2022.   Patients next scheduled appointment   Future Appointments   Date Time Provider Tyshawn Luo   6/14/2022  2:15 PM MALACHI Medley CNP [facility]

## 2022-06-27 DIAGNOSIS — F41.9 ANXIETY: ICD-10-CM

## 2022-06-27 DIAGNOSIS — I10 ESSENTIAL HYPERTENSION: ICD-10-CM

## 2022-06-27 RX ORDER — HYDROCHLOROTHIAZIDE 25 MG/1
25 TABLET ORAL DAILY
Qty: 30 TABLET | Refills: 5 | OUTPATIENT
Start: 2022-06-27 | End: 2022-12-24

## 2022-06-27 RX ORDER — HYDROXYZINE PAMOATE 25 MG/1
CAPSULE ORAL
Qty: 90 CAPSULE | Refills: 0 | Status: SHIPPED
Start: 2022-06-27 | End: 2022-08-18 | Stop reason: SDUPTHER

## 2022-06-27 RX ORDER — HYDROXYZINE PAMOATE 25 MG/1
CAPSULE ORAL
Qty: 90 CAPSULE | Refills: 0 | OUTPATIENT
Start: 2022-06-27

## 2022-06-27 RX ORDER — BUSPIRONE HYDROCHLORIDE 10 MG/1
10 TABLET ORAL 2 TIMES DAILY
Qty: 60 TABLET | Refills: 5 | Status: SHIPPED
Start: 2022-06-27 | End: 2022-09-20 | Stop reason: SDUPTHER

## 2022-06-27 NOTE — TELEPHONE ENCOUNTER
Patients last appointment 2/9/2022.   Patients next scheduled appointment   Future Appointments   Date Time Provider Tyshawn Luo   7/20/2022  2:15 PM MALACHI Carrasquillo CNP [facility]

## 2022-08-18 DIAGNOSIS — F41.9 ANXIETY: ICD-10-CM

## 2022-08-18 RX ORDER — HYDROXYZINE PAMOATE 25 MG/1
CAPSULE ORAL
Qty: 90 CAPSULE | Refills: 0 | Status: SHIPPED
Start: 2022-08-18 | End: 2022-09-20 | Stop reason: SDUPTHER

## 2022-08-18 NOTE — TELEPHONE ENCOUNTER
Refill. Last office visit 2/9/22. Last labs 7/27/21. Knows appt. Is needed, having transportation issues, and a lot going on.

## 2022-09-20 DIAGNOSIS — I10 ESSENTIAL HYPERTENSION: ICD-10-CM

## 2022-09-20 DIAGNOSIS — F41.9 ANXIETY: ICD-10-CM

## 2022-09-20 RX ORDER — BUSPIRONE HYDROCHLORIDE 10 MG/1
10 TABLET ORAL 2 TIMES DAILY
Qty: 60 TABLET | Refills: 5 | Status: SHIPPED
Start: 2022-09-20 | End: 2022-10-19 | Stop reason: SDUPTHER

## 2022-09-20 RX ORDER — HYDROXYZINE PAMOATE 25 MG/1
CAPSULE ORAL
Qty: 90 CAPSULE | Refills: 0 | Status: SHIPPED
Start: 2022-09-20 | End: 2022-10-19 | Stop reason: SDUPTHER

## 2022-09-20 NOTE — TELEPHONE ENCOUNTER
Refill   Needs an kelvin,last kelvin 29/22 states his Anxiety is to bad to come in for one does not know how to do virtural,wants refills

## 2022-09-21 RX ORDER — HYDROCHLOROTHIAZIDE 25 MG/1
25 TABLET ORAL DAILY
Qty: 30 TABLET | Refills: 5 | Status: SHIPPED | OUTPATIENT
Start: 2022-09-21 | End: 2023-03-20

## 2022-09-21 RX ORDER — POTASSIUM CHLORIDE 750 MG/1
TABLET, EXTENDED RELEASE ORAL
Qty: 30 TABLET | Refills: 5 | Status: SHIPPED | OUTPATIENT
Start: 2022-09-21

## 2022-10-19 DIAGNOSIS — F41.9 ANXIETY: ICD-10-CM

## 2022-10-19 RX ORDER — HYDROXYZINE PAMOATE 25 MG/1
CAPSULE ORAL
Qty: 90 CAPSULE | Refills: 0 | Status: SHIPPED | OUTPATIENT
Start: 2022-10-19

## 2022-10-19 RX ORDER — BUSPIRONE HYDROCHLORIDE 10 MG/1
10 TABLET ORAL 2 TIMES DAILY
Qty: 60 TABLET | Refills: 5 | Status: SHIPPED | OUTPATIENT
Start: 2022-10-19 | End: 2023-04-17

## 2022-11-17 DIAGNOSIS — I10 ESSENTIAL HYPERTENSION: ICD-10-CM

## 2022-11-18 DIAGNOSIS — F41.9 ANXIETY: ICD-10-CM

## 2022-11-21 RX ORDER — HYDROXYZINE PAMOATE 25 MG/1
CAPSULE ORAL
Qty: 90 CAPSULE | Refills: 0 | Status: SHIPPED | OUTPATIENT
Start: 2022-11-21

## 2022-11-21 RX ORDER — POTASSIUM CHLORIDE 750 MG/1
TABLET, EXTENDED RELEASE ORAL
Qty: 30 TABLET | Refills: 5 | OUTPATIENT
Start: 2022-11-21

## 2022-11-21 RX ORDER — HYDROCHLOROTHIAZIDE 25 MG/1
25 TABLET ORAL DAILY
Qty: 30 TABLET | Refills: 5 | OUTPATIENT
Start: 2022-11-21 | End: 2023-05-20

## 2022-12-13 DIAGNOSIS — F41.9 ANXIETY: ICD-10-CM

## 2022-12-13 RX ORDER — HYDROXYZINE PAMOATE 25 MG/1
CAPSULE ORAL
Qty: 90 CAPSULE | Refills: 0 | Status: SHIPPED | OUTPATIENT
Start: 2022-12-13

## 2023-08-24 ENCOUNTER — TELEPHONE (OUTPATIENT)
Dept: ENT CLINIC | Age: 63
End: 2023-08-24

## 2023-08-24 NOTE — TELEPHONE ENCOUNTER
Called pt to reschedule missed appointment on 8/23/23 Electronically signed by Sanket Kwan on 8/24/2023 at 10:57 AM

## 2023-08-29 ENCOUNTER — TELEPHONE (OUTPATIENT)
Dept: ENT CLINIC | Age: 63
End: 2023-08-29

## 2023-08-29 NOTE — TELEPHONE ENCOUNTER
LVM to reschedule missed appointment from 8/23/23. Final attempt to contact patient.   Electronically signed by Guera Laughlin on 8/29/2023 at 11:28 AM

## 2024-05-24 ENCOUNTER — APPOINTMENT (OUTPATIENT)
Dept: GENERAL RADIOLOGY | Age: 64
End: 2024-05-24
Payer: MEDICAID

## 2024-05-24 ENCOUNTER — APPOINTMENT (OUTPATIENT)
Dept: MRI IMAGING | Age: 64
End: 2024-05-24
Payer: MEDICAID

## 2024-05-24 ENCOUNTER — HOSPITAL ENCOUNTER (INPATIENT)
Age: 64
LOS: 8 days | Discharge: NURSING, REHAB OR LONG-TERM CARE | End: 2024-06-01
Attending: STUDENT IN AN ORGANIZED HEALTH CARE EDUCATION/TRAINING PROGRAM | Admitting: INTERNAL MEDICINE
Payer: MEDICAID

## 2024-05-24 DIAGNOSIS — C90.00 MULTIPLE MYELOMA NOT HAVING ACHIEVED REMISSION (HCC): ICD-10-CM

## 2024-05-24 DIAGNOSIS — E83.52 HYPERCALCEMIA: Primary | ICD-10-CM

## 2024-05-24 DIAGNOSIS — R93.7 ABNORMAL COMPUTED TOMOGRAPHY OF LUMBAR SPINE: ICD-10-CM

## 2024-05-24 DIAGNOSIS — M54.9 INTRACTABLE BACK PAIN: ICD-10-CM

## 2024-05-24 PROBLEM — M54.40 ACUTE RIGHT-SIDED LOW BACK PAIN WITH SCIATICA: Status: ACTIVE | Noted: 2024-05-24

## 2024-05-24 LAB
ABO + RH BLD: NORMAL
ALBUMIN SERPL-MCNC: 3.4 G/DL (ref 3.5–5.2)
ALP SERPL-CCNC: 60 U/L (ref 40–129)
ALT SERPL-CCNC: <5 U/L (ref 0–40)
ANION GAP SERPL CALCULATED.3IONS-SCNC: 13 MMOL/L (ref 7–16)
ARM BAND NUMBER: NORMAL
AST SERPL-CCNC: 11 U/L (ref 0–39)
BASOPHILS # BLD: 0.02 K/UL (ref 0–0.2)
BASOPHILS NFR BLD: 1 % (ref 0–2)
BILIRUB SERPL-MCNC: 1 MG/DL (ref 0–1.2)
BILIRUB UR QL STRIP: NEGATIVE
BLOOD BANK SAMPLE EXPIRATION: NORMAL
BLOOD GROUP ANTIBODIES SERPL: NEGATIVE
BUN SERPL-MCNC: 16 MG/DL (ref 6–23)
CA-I BLD-SCNC: 1.45 MMOL/L (ref 1.15–1.33)
CALCIUM SERPL-MCNC: 12.2 MG/DL (ref 8.6–10.2)
CHLORIDE SERPL-SCNC: 97 MMOL/L (ref 98–107)
CLARITY UR: CLEAR
CO2 SERPL-SCNC: 21 MMOL/L (ref 22–29)
COLOR UR: YELLOW
CREAT SERPL-MCNC: 1.6 MG/DL (ref 0.7–1.2)
CREAT UR-MCNC: 56.2 MG/DL (ref 40–278)
EOSINOPHIL # BLD: 0.04 K/UL (ref 0.05–0.5)
EOSINOPHILS RELATIVE PERCENT: 1 % (ref 0–6)
EPI CELLS #/AREA URNS HPF: NORMAL /HPF
ERYTHROCYTE [DISTWIDTH] IN BLOOD BY AUTOMATED COUNT: 13.5 % (ref 11.5–15)
FERRITIN SERPL-MCNC: 792 NG/ML
GFR, ESTIMATED: 47 ML/MIN/1.73M2
GLUCOSE SERPL-MCNC: 98 MG/DL (ref 74–99)
GLUCOSE UR STRIP-MCNC: NEGATIVE MG/DL
HCT VFR BLD AUTO: 23.3 % (ref 37–54)
HGB BLD-MCNC: 8.1 G/DL (ref 12.5–16.5)
HGB UR QL STRIP.AUTO: ABNORMAL
IMM GRANULOCYTES # BLD AUTO: 0.07 K/UL (ref 0–0.58)
IMM GRANULOCYTES NFR BLD: 2 % (ref 0–5)
INR PPP: 1.4
KETONES UR STRIP-MCNC: 40 MG/DL
LEUKOCYTE ESTERASE UR QL STRIP: NEGATIVE
LYMPHOCYTES NFR BLD: 0.69 K/UL (ref 1.5–4)
LYMPHOCYTES RELATIVE PERCENT: 23 % (ref 20–42)
MCH RBC QN AUTO: 34.9 PG (ref 26–35)
MCHC RBC AUTO-ENTMCNC: 34.8 G/DL (ref 32–34.5)
MCV RBC AUTO: 100.4 FL (ref 80–99.9)
MICROALBUMIN UR-MCNC: 13 MG/L (ref 0–19)
MICROALBUMIN/CREAT UR-RTO: 22 MCG/MG CREAT (ref 0–30)
MONOCYTES NFR BLD: 0.35 K/UL (ref 0.1–0.95)
MONOCYTES NFR BLD: 12 % (ref 2–12)
NEUTROPHILS NFR BLD: 60 % (ref 43–80)
NEUTS SEG NFR BLD: 1.78 K/UL (ref 1.8–7.3)
NITRITE UR QL STRIP: NEGATIVE
PARTIAL THROMBOPLASTIN TIME: 23.2 SEC (ref 24.5–35.1)
PH UR STRIP: 6 [PH] (ref 5–9)
PLATELET # BLD AUTO: 183 K/UL (ref 130–450)
PMV BLD AUTO: 8.7 FL (ref 7–12)
POTASSIUM SERPL-SCNC: 3.6 MMOL/L (ref 3.5–5)
PROT SERPL-MCNC: 8.8 G/DL (ref 6.4–8.3)
PROT UR STRIP-MCNC: NEGATIVE MG/DL
PROTHROMBIN TIME: 15 SEC (ref 9.3–12.4)
PTH-INTACT SERPL-MCNC: 8.9 PG/ML (ref 15–65)
RBC # BLD AUTO: 2.32 M/UL (ref 3.8–5.8)
RBC # BLD: ABNORMAL 10*6/UL
RBC #/AREA URNS HPF: NORMAL /HPF
SODIUM SERPL-SCNC: 131 MMOL/L (ref 132–146)
SODIUM UR-SCNC: 40 MMOL/L
SP GR UR STRIP: 1.01 (ref 1–1.03)
UROBILINOGEN UR STRIP-ACNC: 0.2 EU/DL (ref 0–1)
WBC #/AREA URNS HPF: NORMAL /HPF
WBC OTHER # BLD: 3 K/UL (ref 4.5–11.5)

## 2024-05-24 PROCEDURE — 83970 ASSAY OF PARATHORMONE: CPT

## 2024-05-24 PROCEDURE — 86335 IMMUNFIX E-PHORSIS/URINE/CSF: CPT

## 2024-05-24 PROCEDURE — 85025 COMPLETE CBC W/AUTO DIFF WBC: CPT

## 2024-05-24 PROCEDURE — 82570 ASSAY OF URINE CREATININE: CPT

## 2024-05-24 PROCEDURE — 82043 UR ALBUMIN QUANTITATIVE: CPT

## 2024-05-24 PROCEDURE — 82330 ASSAY OF CALCIUM: CPT

## 2024-05-24 PROCEDURE — 84156 ASSAY OF PROTEIN URINE: CPT

## 2024-05-24 PROCEDURE — 86901 BLOOD TYPING SEROLOGIC RH(D): CPT

## 2024-05-24 PROCEDURE — 82728 ASSAY OF FERRITIN: CPT

## 2024-05-24 PROCEDURE — 6360000002 HC RX W HCPCS: Performed by: STUDENT IN AN ORGANIZED HEALTH CARE EDUCATION/TRAINING PROGRAM

## 2024-05-24 PROCEDURE — 85610 PROTHROMBIN TIME: CPT

## 2024-05-24 PROCEDURE — 84166 PROTEIN E-PHORESIS/URINE/CSF: CPT

## 2024-05-24 PROCEDURE — 6360000002 HC RX W HCPCS: Performed by: FAMILY MEDICINE

## 2024-05-24 PROCEDURE — 2580000003 HC RX 258: Performed by: FAMILY MEDICINE

## 2024-05-24 PROCEDURE — 99285 EMERGENCY DEPT VISIT HI MDM: CPT

## 2024-05-24 PROCEDURE — 86900 BLOOD TYPING SEROLOGIC ABO: CPT

## 2024-05-24 PROCEDURE — 84300 ASSAY OF URINE SODIUM: CPT

## 2024-05-24 PROCEDURE — 6370000000 HC RX 637 (ALT 250 FOR IP): Performed by: NURSE PRACTITIONER

## 2024-05-24 PROCEDURE — 86850 RBC ANTIBODY SCREEN: CPT

## 2024-05-24 PROCEDURE — 85730 THROMBOPLASTIN TIME PARTIAL: CPT

## 2024-05-24 PROCEDURE — 2580000003 HC RX 258: Performed by: INTERNAL MEDICINE

## 2024-05-24 PROCEDURE — 80053 COMPREHEN METABOLIC PANEL: CPT

## 2024-05-24 PROCEDURE — 81001 URINALYSIS AUTO W/SCOPE: CPT

## 2024-05-24 PROCEDURE — 84165 PROTEIN E-PHORESIS SERUM: CPT

## 2024-05-24 PROCEDURE — 99222 1ST HOSP IP/OBS MODERATE 55: CPT | Performed by: NEUROLOGICAL SURGERY

## 2024-05-24 PROCEDURE — 84155 ASSAY OF PROTEIN SERUM: CPT

## 2024-05-24 PROCEDURE — 2060000000 HC ICU INTERMEDIATE R&B

## 2024-05-24 RX ORDER — SODIUM CHLORIDE 9 MG/ML
INJECTION, SOLUTION INTRAVENOUS CONTINUOUS
Status: DISCONTINUED | OUTPATIENT
Start: 2024-05-24 | End: 2024-05-28

## 2024-05-24 RX ORDER — ONDANSETRON 4 MG/1
4 TABLET, ORALLY DISINTEGRATING ORAL EVERY 8 HOURS PRN
Status: DISCONTINUED | OUTPATIENT
Start: 2024-05-24 | End: 2024-06-01 | Stop reason: HOSPADM

## 2024-05-24 RX ORDER — FENTANYL CITRATE 50 UG/ML
50 INJECTION, SOLUTION INTRAMUSCULAR; INTRAVENOUS ONCE
Status: COMPLETED | OUTPATIENT
Start: 2024-05-24 | End: 2024-05-24

## 2024-05-24 RX ORDER — HYDROXYZINE PAMOATE 25 MG/1
25 CAPSULE ORAL EVERY 6 HOURS PRN
Status: DISCONTINUED | OUTPATIENT
Start: 2024-05-24 | End: 2024-05-31 | Stop reason: SDUPTHER

## 2024-05-24 RX ORDER — ONDANSETRON 2 MG/ML
4 INJECTION INTRAMUSCULAR; INTRAVENOUS EVERY 6 HOURS PRN
Status: DISCONTINUED | OUTPATIENT
Start: 2024-05-24 | End: 2024-06-01 | Stop reason: HOSPADM

## 2024-05-24 RX ORDER — OXYCODONE HYDROCHLORIDE AND ACETAMINOPHEN 5; 325 MG/1; MG/1
1 TABLET ORAL ONCE
Status: COMPLETED | OUTPATIENT
Start: 2024-05-24 | End: 2024-05-24

## 2024-05-24 RX ORDER — SODIUM CHLORIDE 9 MG/ML
INJECTION, SOLUTION INTRAVENOUS CONTINUOUS
Status: DISCONTINUED | OUTPATIENT
Start: 2024-05-24 | End: 2024-05-24

## 2024-05-24 RX ORDER — ESCITALOPRAM OXALATE 10 MG/1
10 TABLET ORAL DAILY
Status: DISCONTINUED | OUTPATIENT
Start: 2024-05-24 | End: 2024-05-24

## 2024-05-24 RX ORDER — ENOXAPARIN SODIUM 100 MG/ML
40 INJECTION SUBCUTANEOUS DAILY
Status: DISCONTINUED | OUTPATIENT
Start: 2024-05-24 | End: 2024-06-01 | Stop reason: HOSPADM

## 2024-05-24 RX ADMIN — FENTANYL CITRATE 50 MCG: 50 INJECTION INTRAMUSCULAR; INTRAVENOUS at 16:18

## 2024-05-24 RX ADMIN — HYDROXYZINE PAMOATE 25 MG: 25 CAPSULE ORAL at 23:50

## 2024-05-24 RX ADMIN — SODIUM CHLORIDE: 9 INJECTION, SOLUTION INTRAVENOUS at 20:50

## 2024-05-24 RX ADMIN — OXYCODONE HYDROCHLORIDE AND ACETAMINOPHEN 1 TABLET: 5; 325 TABLET ORAL at 20:45

## 2024-05-24 RX ADMIN — SODIUM CHLORIDE: 9 INJECTION, SOLUTION INTRAVENOUS at 16:22

## 2024-05-24 ASSESSMENT — PAIN SCALES - GENERAL
PAINLEVEL_OUTOF10: 5
PAINLEVEL_OUTOF10: 0
PAINLEVEL_OUTOF10: 5

## 2024-05-24 ASSESSMENT — LIFESTYLE VARIABLES: HOW MANY STANDARD DRINKS CONTAINING ALCOHOL DO YOU HAVE ON A TYPICAL DAY: PATIENT DOES NOT DRINK

## 2024-05-24 ASSESSMENT — PAIN DESCRIPTION - LOCATION
LOCATION: LEG;BACK
LOCATION: LEG

## 2024-05-24 ASSESSMENT — PAIN DESCRIPTION - DESCRIPTORS: DESCRIPTORS: SHARP

## 2024-05-24 NOTE — CONSULTS
Department of Internal Medicine  Nephrology Attending Consult Note      Reason for Consult: Hypercalcemia and elevated creatinine  Requesting Physician:  Yohan Leblanc DO     CHIEF COMPLAINT: Back pain    History Obtained From:  patient, electronic medical record    HISTORY OF PRESENT ILLNESS:  Briefly Mr. Alvarado is a 64-year-old male with history of HTN, nephrolithiasis, hyperlipidemia, hard of hearing, who was admitted on May 24, 2024 transferred from [facility], where he initially presented with altered mental status back pain and was found to have hypercalcemia with calcium >14, he has well had a CT scan of the spine which showed multiple lytic lesions with compression fracture reason for his transferring.  On admission he was found to have a sodium level of 131, creatinine 1.6 mg/dL and calcium level 12.2 mg/dL, reasons for this consultation.     Past Medical History:        Diagnosis Date    Anxiety      Past Surgical History:    No past surgical history on file.  Current Medications:    Current Facility-Administered Medications: fentaNYL (SUBLIMAZE) injection 50 mcg, 50 mcg, IntraVENous, Once  0.9 % sodium chloride infusion, , IntraVENous, Continuous  Allergies:  Patient has no known allergies.    Social History:    TOBACCO:   reports that he has never smoked. He has never used smokeless tobacco.  ETOH:   reports no history of alcohol use.    Family History:       Problem Relation Age of Onset    Hypertension Father      REVIEW OF SYSTEMS:    CONSTITUTIONAL:  negative for  fevers and chills  EYES:  negative  HEENT:  negative  RESPIRATORY:  negative  CARDIOVASCULAR:  negative  GASTROINTESTINAL:  negative  GENITOURINARY:  negative  INTEGUMENT/BREAST:  negative  HEMATOLOGIC/LYMPHATIC:  negative  ALLERGIC/IMMUNOLOGIC:  negative  ENDOCRINE:  negative  MUSCULOSKELETAL:  positive for  pain  NEUROLOGICAL:  negative    PHYSICAL EXAM:      Vitals:    VITALS:  /74   Pulse 94   Temp 97.9 °F (36.6

## 2024-05-24 NOTE — PROCEDURES
Patient refusing his bone survey x-rays. He stated he wants to try tomorrow after he gets pain meds and calms down.

## 2024-05-24 NOTE — PROGRESS NOTES
Chart reviewed, patient examined, full consultation to follow.    Briefly Mr. lAvarado is a 64-year-old male with history of HTN, nephrolithiasis, hyperlipidemia, hard of hearing, who was admitted on May 24, 2024 transferred from [facility], where he initially presented with altered mental status back pain and was found to have hypercalcemia with calcium >14, he has well had a CT scan of the spine which showed multiple lytic lesions with compression fracture reason for his transferring.  On admission he was found to have a sodium level of 131, creatinine 1.6 mg/dL and calcium level 12.2 mg/dL, reasons for this consultation.      RAMONA versus RAMONA on CKD, probably volume responsive prerenal RAOMNA, rule out myeloma kidney    Hypercalcemia, associated with anemia, elevated total protein, rule out multiple myeloma.  Hyponatremia, probably hemodynamically mediated versus pseudohyponatremia  Macrocytic anemia, rule out multiple myeloma, to obtain iron studies, B12, folate    Plan:    Start NS at 125 cc/hour  Calcitonin 4 units/kilo subcutaneously x 1  Obtain SPEP and UPEP  Obtain urinalysis  Obtain urine protein/creatinine and albumin/creatinine ratio  Obtain bone survey  Obtain PTH level  Obtain ionized calcium  Monitor calcium level  Monitor renal  Obtain oncology/hematology:    Adelaida Talley MD

## 2024-05-25 ENCOUNTER — APPOINTMENT (OUTPATIENT)
Dept: MRI IMAGING | Age: 64
End: 2024-05-25
Payer: MEDICAID

## 2024-05-25 PROBLEM — M89.9 LYTIC BONE LESIONS ON XRAY: Status: ACTIVE | Noted: 2024-05-25

## 2024-05-25 PROBLEM — Z71.89 GOALS OF CARE, COUNSELING/DISCUSSION: Status: ACTIVE | Noted: 2024-05-25

## 2024-05-25 PROBLEM — Z51.5 PALLIATIVE CARE ENCOUNTER: Status: ACTIVE | Noted: 2024-05-25

## 2024-05-25 LAB
ALBUMIN SERPL-MCNC: 3.2 G/DL (ref 3.5–5.2)
ALP SERPL-CCNC: 55 U/L (ref 40–129)
ALT SERPL-CCNC: 6 U/L (ref 0–40)
ANION GAP SERPL CALCULATED.3IONS-SCNC: 14 MMOL/L (ref 7–16)
AST SERPL-CCNC: 13 U/L (ref 0–39)
BASOPHILS # BLD: 0 K/UL (ref 0–0.2)
BASOPHILS NFR BLD: 0 % (ref 0–2)
BILIRUB SERPL-MCNC: 0.9 MG/DL (ref 0–1.2)
BLASTS ABSOLUTE COUNT: 0.03 K/UL
BLASTS: 1 %
BUN SERPL-MCNC: 17 MG/DL (ref 6–23)
CA-I BLD-SCNC: 1.53 MMOL/L (ref 1.15–1.33)
CALCIUM SERPL-MCNC: 11.7 MG/DL (ref 8.6–10.2)
CHLORIDE SERPL-SCNC: 102 MMOL/L (ref 98–107)
CO2 SERPL-SCNC: 20 MMOL/L (ref 22–29)
CREAT SERPL-MCNC: 1.6 MG/DL (ref 0.7–1.2)
EOSINOPHIL # BLD: 0 K/UL (ref 0.05–0.5)
EOSINOPHILS RELATIVE PERCENT: 0 % (ref 0–6)
ERYTHROCYTE [DISTWIDTH] IN BLOOD BY AUTOMATED COUNT: 13.9 % (ref 11.5–15)
FOLATE SERPL-MCNC: 4.7 NG/ML (ref 4.8–24.2)
GFR, ESTIMATED: 48 ML/MIN/1.73M2
GLUCOSE SERPL-MCNC: 84 MG/DL (ref 74–99)
HCT VFR BLD AUTO: 22.8 % (ref 37–54)
HGB BLD-MCNC: 7.5 G/DL (ref 12.5–16.5)
IRON SATN MFR SERPL: 37 % (ref 20–55)
IRON SERPL-MCNC: 72 UG/DL (ref 59–158)
LYMPHOCYTES NFR BLD: 0.57 K/UL (ref 1.5–4)
LYMPHOCYTES RELATIVE PERCENT: 18 % (ref 20–42)
MAGNESIUM SERPL-MCNC: 1.6 MG/DL (ref 1.6–2.6)
MCH RBC QN AUTO: 33.6 PG (ref 26–35)
MCHC RBC AUTO-ENTMCNC: 32.9 G/DL (ref 32–34.5)
MCV RBC AUTO: 102.2 FL (ref 80–99.9)
MONOCYTES NFR BLD: 0.08 K/UL (ref 0.1–0.95)
MONOCYTES NFR BLD: 3 % (ref 2–12)
NEUTROPHILS NFR BLD: 78 % (ref 43–80)
NEUTS SEG NFR BLD: 2.43 K/UL (ref 1.8–7.3)
PHOSPHATE SERPL-MCNC: 3.1 MG/DL (ref 2.5–4.5)
PLATELET # BLD AUTO: 171 K/UL (ref 130–450)
PMV BLD AUTO: 8.8 FL (ref 7–12)
POTASSIUM SERPL-SCNC: 3.7 MMOL/L (ref 3.5–5)
PROT SERPL-MCNC: 8.5 G/DL (ref 6.4–8.3)
RBC # BLD AUTO: 2.23 M/UL (ref 3.8–5.8)
RBC # BLD: ABNORMAL 10*6/UL
SODIUM SERPL-SCNC: 136 MMOL/L (ref 132–146)
TIBC SERPL-MCNC: 193 UG/DL (ref 250–450)
VIT B12 SERPL-MCNC: 204 PG/ML (ref 211–946)
WBC OTHER # BLD: 3.1 K/UL (ref 4.5–11.5)

## 2024-05-25 PROCEDURE — 6370000000 HC RX 637 (ALT 250 FOR IP): Performed by: NURSE PRACTITIONER

## 2024-05-25 PROCEDURE — 6370000000 HC RX 637 (ALT 250 FOR IP): Performed by: INTERNAL MEDICINE

## 2024-05-25 PROCEDURE — 99223 1ST HOSP IP/OBS HIGH 75: CPT | Performed by: STUDENT IN AN ORGANIZED HEALTH CARE EDUCATION/TRAINING PROGRAM

## 2024-05-25 PROCEDURE — 82746 ASSAY OF FOLIC ACID SERUM: CPT

## 2024-05-25 PROCEDURE — 2580000003 HC RX 258: Performed by: INTERNAL MEDICINE

## 2024-05-25 PROCEDURE — 6370000000 HC RX 637 (ALT 250 FOR IP): Performed by: STUDENT IN AN ORGANIZED HEALTH CARE EDUCATION/TRAINING PROGRAM

## 2024-05-25 PROCEDURE — 83735 ASSAY OF MAGNESIUM: CPT

## 2024-05-25 PROCEDURE — 2580000003 HC RX 258: Performed by: FAMILY MEDICINE

## 2024-05-25 PROCEDURE — 84100 ASSAY OF PHOSPHORUS: CPT

## 2024-05-25 PROCEDURE — 83550 IRON BINDING TEST: CPT

## 2024-05-25 PROCEDURE — 82607 VITAMIN B-12: CPT

## 2024-05-25 PROCEDURE — 85025 COMPLETE CBC W/AUTO DIFF WBC: CPT

## 2024-05-25 PROCEDURE — 6360000004 HC RX CONTRAST MEDICATION: Performed by: RADIOLOGY

## 2024-05-25 PROCEDURE — 72158 MRI LUMBAR SPINE W/O & W/DYE: CPT

## 2024-05-25 PROCEDURE — 6360000002 HC RX W HCPCS: Performed by: INTERNAL MEDICINE

## 2024-05-25 PROCEDURE — 1200000000 HC SEMI PRIVATE

## 2024-05-25 PROCEDURE — 99223 1ST HOSP IP/OBS HIGH 75: CPT | Performed by: NURSE PRACTITIONER

## 2024-05-25 PROCEDURE — 36415 COLL VENOUS BLD VENIPUNCTURE: CPT

## 2024-05-25 PROCEDURE — A9577 INJ MULTIHANCE: HCPCS | Performed by: RADIOLOGY

## 2024-05-25 PROCEDURE — 82330 ASSAY OF CALCIUM: CPT

## 2024-05-25 PROCEDURE — 80053 COMPREHEN METABOLIC PANEL: CPT

## 2024-05-25 PROCEDURE — 83540 ASSAY OF IRON: CPT

## 2024-05-25 RX ORDER — CYANOCOBALAMIN 1000 UG/ML
1000 INJECTION, SOLUTION INTRAMUSCULAR; SUBCUTANEOUS DAILY
Status: DISPENSED | OUTPATIENT
Start: 2024-05-25 | End: 2024-05-30

## 2024-05-25 RX ORDER — HYDRALAZINE HYDROCHLORIDE 20 MG/ML
5 INJECTION INTRAMUSCULAR; INTRAVENOUS EVERY 6 HOURS PRN
Status: DISCONTINUED | OUTPATIENT
Start: 2024-05-25 | End: 2024-06-01 | Stop reason: HOSPADM

## 2024-05-25 RX ORDER — SENNA AND DOCUSATE SODIUM 50; 8.6 MG/1; MG/1
2 TABLET, FILM COATED ORAL DAILY PRN
Status: DISCONTINUED | OUTPATIENT
Start: 2024-05-25 | End: 2024-06-01 | Stop reason: HOSPADM

## 2024-05-25 RX ORDER — FOLIC ACID 1 MG/1
1 TABLET ORAL DAILY
Status: DISCONTINUED | OUTPATIENT
Start: 2024-05-25 | End: 2024-06-01 | Stop reason: HOSPADM

## 2024-05-25 RX ORDER — OXYCODONE HYDROCHLORIDE AND ACETAMINOPHEN 5; 325 MG/1; MG/1
1 TABLET ORAL EVERY 6 HOURS PRN
Status: DISCONTINUED | OUTPATIENT
Start: 2024-05-25 | End: 2024-05-28

## 2024-05-25 RX ORDER — CYCLOBENZAPRINE HCL 10 MG
10 TABLET ORAL 3 TIMES DAILY PRN
Status: DISCONTINUED | OUTPATIENT
Start: 2024-05-25 | End: 2024-06-01 | Stop reason: HOSPADM

## 2024-05-25 RX ADMIN — SODIUM CHLORIDE: 9 INJECTION, SOLUTION INTRAVENOUS at 09:16

## 2024-05-25 RX ADMIN — OXYCODONE HYDROCHLORIDE AND ACETAMINOPHEN 1 TABLET: 5; 325 TABLET ORAL at 15:10

## 2024-05-25 RX ADMIN — HYDROXYZINE PAMOATE 25 MG: 25 CAPSULE ORAL at 09:30

## 2024-05-25 RX ADMIN — GADOBENATE DIMEGLUMINE 14 ML: 529 INJECTION, SOLUTION INTRAVENOUS at 12:11

## 2024-05-25 RX ADMIN — SODIUM CHLORIDE: 9 INJECTION, SOLUTION INTRAVENOUS at 22:55

## 2024-05-25 RX ADMIN — FOLIC ACID 1 MG: 1 TABLET ORAL at 15:10

## 2024-05-25 RX ADMIN — WATER 40 MG: 1 INJECTION INTRAMUSCULAR; INTRAVENOUS; SUBCUTANEOUS at 14:42

## 2024-05-25 RX ADMIN — HYDROXYZINE PAMOATE 25 MG: 25 CAPSULE ORAL at 20:37

## 2024-05-25 RX ADMIN — HYDROMORPHONE HYDROCHLORIDE 0.25 MG: 1 INJECTION, SOLUTION INTRAMUSCULAR; INTRAVENOUS; SUBCUTANEOUS at 11:03

## 2024-05-25 ASSESSMENT — PAIN DESCRIPTION - ORIENTATION
ORIENTATION: RIGHT

## 2024-05-25 ASSESSMENT — PAIN SCALES - GENERAL
PAINLEVEL_OUTOF10: 1
PAINLEVEL_OUTOF10: 4
PAINLEVEL_OUTOF10: 6

## 2024-05-25 ASSESSMENT — PAIN DESCRIPTION - DESCRIPTORS
DESCRIPTORS: ACHING;DISCOMFORT;SORE
DESCRIPTORS: CRAMPING
DESCRIPTORS: CRAMPING;SHARP

## 2024-05-25 ASSESSMENT — PAIN DESCRIPTION - ONSET
ONSET: ON-GOING
ONSET: PROGRESSIVE
ONSET: ON-GOING

## 2024-05-25 ASSESSMENT — PAIN - FUNCTIONAL ASSESSMENT
PAIN_FUNCTIONAL_ASSESSMENT: PREVENTS OR INTERFERES SOME ACTIVE ACTIVITIES AND ADLS

## 2024-05-25 ASSESSMENT — PAIN DESCRIPTION - LOCATION
LOCATION: LEG

## 2024-05-25 ASSESSMENT — PAIN DESCRIPTION - PAIN TYPE
TYPE: ACUTE PAIN

## 2024-05-25 ASSESSMENT — PAIN DESCRIPTION - FREQUENCY
FREQUENCY: INTERMITTENT

## 2024-05-25 NOTE — PROGRESS NOTES
Department of Internal Medicine  Nephrology Attending Progress Note    Events reviewed     SUBJECTIVE: we are following Mr Jenny for hypercalcemia and elevated creatinine. Complaining of back pain.     Current Medications:    Current Facility-Administered Medications: enoxaparin (LOVENOX) injection 40 mg, 40 mg, SubCUTAneous, Daily  ondansetron (ZOFRAN-ODT) disintegrating tablet 4 mg, 4 mg, Oral, Q8H PRN **OR** ondansetron (ZOFRAN) injection 4 mg, 4 mg, IntraVENous, Q6H PRN  0.9 % sodium chloride infusion, , IntraVENous, Continuous  hydrOXYzine pamoate (VISTARIL) capsule 25 mg, 25 mg, Oral, Q6H PRN    PHYSICAL EXAM:      Vitals:    VITALS:  /80   Pulse 97   Temp 98 °F (36.7 °C) (Temporal)   Resp 21   Wt 68 kg (150 lb)   SpO2 91%   BMI 22.15 kg/m²   24HR INTAKE/OUTPUT:    Intake/Output Summary (Last 24 hours) at 5/25/2024 0933  Last data filed at 5/25/2024 0507  Gross per 24 hour   Intake --   Output 500 ml   Net -500 ml       Constitutional: Patient is awake, alert, in no distress  HEENT: Pupils equal reactive  Respiratory: Lungs are clear  Cardiovascular/Edema: Heart sounds are regular  Gastrointestinal: Abdomen soft  Neurologic: No focal  Skin: No lesions  Other: No edema    DATA:    CBC:   Lab Results   Component Value Date/Time    WBC 3.1 05/25/2024 07:27 AM    RBC 2.23 05/25/2024 07:27 AM    HGB 7.5 05/25/2024 07:27 AM    HCT 22.8 05/25/2024 07:27 AM    .2 05/25/2024 07:27 AM    MCH 33.6 05/25/2024 07:27 AM    MCHC 32.9 05/25/2024 07:27 AM    RDW 13.9 05/25/2024 07:27 AM     05/25/2024 07:27 AM    MPV 8.8 05/25/2024 07:27 AM     CMP:    Lab Results   Component Value Date/Time     05/25/2024 07:27 AM    K 3.7 05/25/2024 07:27 AM     05/25/2024 07:27 AM    CO2 20 05/25/2024 07:27 AM    BUN 17 05/25/2024 07:27 AM    CREATININE 1.6 05/25/2024 07:27 AM    GFRAA >60 07/27/2021 07:55 AM    LABGLOM 48 05/25/2024 07:27 AM    GLUCOSE 84 05/25/2024 07:27 AM    CALCIUM 11.7 05/25/2024  07:27 AM    BILITOT 0.9 05/25/2024 07:27 AM    ALKPHOS 55 05/25/2024 07:27 AM    AST 13 05/25/2024 07:27 AM    ALT 6 05/25/2024 07:27 AM     Magnesium:    Lab Results   Component Value Date/Time    MG 1.6 05/25/2024 07:27 AM     Phosphorus:    Lab Results   Component Value Date/Time    PHOS 3.1 05/25/2024 07:27 AM       Radiology Review:      IMPRESSION/RECOMMENDATIONS:      Briefly Mr. Alvarado is a 64-year-old male with history of HTN, nephrolithiasis, hyperlipidemia, hard of hearing, who was admitted on May 24, 2024 transferred from [facility], where he initially presented with altered mental status back pain and was found to have hypercalcemia with calcium >14, he has well had a CT scan of the spine which showed multiple lytic lesions with compression fracture reason for his transferring.  On admission he was found to have a sodium level of 131, creatinine 1.6 mg/dL and calcium level 12.2 mg/dL, reasons for this consultation.        RAMONA versus RAMONA on CKD, probably volume responsive prerenal RAMONA, rule out myeloma kidney     Hypercalcemia, associated with anemia, elevated total protein, rule out multiple myeloma. Pth 8.9, ionized calcium 1.53  Hyponatremia, probably hemodynamically mediated versus pseudohyponatremia, resolved with IVF administration. Home lexapro on hold.   Macrocytic anemia, rule out multiple myeloma, TIBC 193, iron 72, ferritin 792, folate 4.7, vitamin b12 204     Plan:     Continue NS at 100 cc/hour  Solumedrol 40 IV x 1   Obtain SPEP and UPEP  Obtain urine protein/creatinine and albumin/creatinine ratio  Obtain bone survey  Monitor calcium level  Monitor renal function  Obtain oncology/hematology consult     MALACHI Lindsey NP  5/25/24     MD:  Patient seen. Case reviewed.  Discussed with CNP.  Agree with plan.

## 2024-05-25 NOTE — CONSULTS
Palliative Care Department  [phone]  Palliative Care Initial Consult  Provider Nisha Mathis, MALACHI - YISSEL    Derek Alvarado  [id number]  Hospital Day: 2  Date of Initial Consult: 5/25/2024  Referring Provider: Dave Martinez MD  Palliative Medicine was consulted for assistance with: Overwhelming symptoms    HPI:   Derek Alvarado is a 64 y.o. with a medical history of anxiety who was admitted on 5/24/2024 from home with a CHIEF COMPLAINT of low back pain.  Patient is very hard of hearing and a poor historian.  States for the past 3 weeks he has been having severe lower back pain and difficulty walking.  MRI showing severe subacute compression fracture of T12 with up to 80% height loss centrally.  Severe chronic compression fractures of L1.  Moderate chronic compression fracture of L4.  Mild chronic compression deformities of T11, L2, L3 and L5.  Erosive changes and associated abnormal enhancement, most pronounced in the left sacrum and right iliac wing, suspicious for bony metastasis.  Patient was admitted for further medical management.  ASSESSMENT/PLAN:     Pertinent Hospital Diagnoses     Lytic bone lesions  Hypercalcemia    Palliative Care Encounter / Counseling Regarding Goals of Care  Please see detailed goals of care discussion as below  At this time, Derek Alvarado, Does have capacity for medical decision-making.  Capacity is time limited and situation/question specific  During encounter there was no surrogate medical decision-maker needed  Outcome of goals of care meeting:   Symptom management  Full code  Code status Full Code  Advanced Directives: no POA or living will in [facility]  Surrogate/Legal NOK:  Michael Alvarado (parent) [phone]  Krista Clouder (girlfriend) [phone]      Pain related to neoplasm:   -Continue Dilaudid 0.25 mg IV every 4 hours as needed pain   (Breakthrough pain only; use p.o. first)   -Continue Percocet 5-325 mg every 6 hours as needed pain  Cramps/muscle spasm:   -Continue Flexeril 10  mg 3 times daily as needed     Bowel regimen:   -Senna-S 2 tabs daily     Spiritual assessment: no spiritual distress identified  Bereavement and grief: to be determined  Referrals to: none today  SUBJECTIVE:     Current medical issues leading to Palliative Medicine involvement include   Active Hospital Problems    Diagnosis Date Noted    Lytic bone lesions on xray [M89.9] 05/25/2024    Hypercalcemia [E83.52] 05/24/2024    Acute right-sided low back pain with sciatica [M54.40] 05/24/2024    Essential hypertension [I10] 10/22/2020       Details of Conversation:    Chart reviewed.  Update received from nursing.  Patient seen resting in bed, hard of hearing.  Alert and oriented and able to hold meaningful conversation.  Hard to hold conversation as patient is very scattered with his thoughts and focused on his current cramping.  Role of palliative medicine introduced.  Derek is not  and does not have any children.  He does not have a living will or healthcare power of .  States if he is unable to make decisions for himself he would like his father, Michael, to be surrogate medical decision maker.  He does live at home with his girlfriend, Krista, however she has MS and her health is declining also.  In-depth discussion regarding current condition to include compression fractures and lytic bone lesions concerning for metastatic disease.  Unsure if patient comprehends diagnosis.  Patient states he has been experiencing low back pain described as constant and throbbing.  Currently rated 4/10.  Patient states the bigger problem is the cramping he is experiencing in his left hip/leg.  Discussed trying Flexeril to relieve pain.  Patient verbalizes understanding and agrees with plan.  Continue Dilaudid for breakthrough pain only.  Important to try p.o. Percocet prior to IV medication.  Discussed starting bowel regimen of Soledad-Colace to prevent constipation.  Patient verbalizes understanding and agrees with

## 2024-05-25 NOTE — CONSULTS
[facility]    Inpatient Medical Oncology/Hematology Consult Note      Patient Name: Derek Alvarado  YOB: 1960    DATE OF ADMISSION: 5/24/2024  DATE OF CONSULTATION: 5/25/2024  CONSULTING PROVIDER: Adelaida Talley MD  REASON FOR CONSULTATION: \"Anemia, hypercalcemia, rule out multiple myeloma\"  PCP: John Nuno    Room: [address] [place] [address]      CHIEF COMPLAINT:  Back pain    HISTORY OF PRESENT ILLNESS (5/25/2024):   Patient is a 64 y.o. male comes in for back pain. He initially presented to [facility] with these symptoms that started in recent days. Also complains of right lower extremity cramping with some degree of weakness. He was somewhat hard of hearing. Patient c/o right arm tingling and some weakness.  Apart from his lower back he does not have significant pain else where including the neck.    Upon admission, labs found his Hgb was 8.1 with Hgb; calcium was 12.2. Creatinine was 1.6.         Review of Systems   Constitutional: Negative.    HENT:  Negative.     Respiratory: Negative.     Cardiovascular: Negative.    Gastrointestinal: Negative.    Genitourinary: Negative.     Musculoskeletal:  Positive for back pain.        Right leg cramping/weakness; right arm tingling/weakness   Skin: Negative.    Neurological:  Positive for extremity weakness (right leg).   Hematological: Negative.    Psychiatric/Behavioral: Negative.                    Past Medical History:   Diagnosis Date    Anxiety        No past surgical history on file.    Social History     Socioeconomic History    Marital status: Single   Tobacco Use    Smoking status: Never    Smokeless tobacco: Never   Vaping Use    Vaping Use: Never used   Substance and Sexual Activity    Alcohol use: Never    Drug use: Never     Social Determinants of Health     Financial Resource Strain: Low Risk  (1/26/2022)    Overall Financial Resource Strain (CARDIA)     Difficulty of Paying Living Expenses: Not hard at all   Food

## 2024-05-25 NOTE — CONSULTS
[facility]              [address] [place] [address]                              CONSULTATION      PATIENT NAME: DASIA BUITRAGO                 : 1960  MED REC NO: [id number]                        ROOM: [id number]  ACCOUNT NO: [id number]                       ADMIT DATE: 2024  PROVIDER: Faraz Potter MD      CONSULT DATE:  2024    REASON FOR CONSULT:    1. Thoracic and lumbar compression fractures.  2. Possible pathologic fracture.    HISTORY OF PRESENT ILLNESS:  The patient is a 64-year-old gentleman who presented to the hospital with worsening back pain.  He described the pain as a sharp stabbing pain that was worse with activity and better with rest.  He apparently had a CT scan at an outside hospital that showed multiple compression fractures with evidence of neural  compression and is ultimately transported to [facility] for further evaluation and management.  At this time, he denies any numbness, tingling, or weakness or loss of control of bowel or bladder function.    PAST MEDICAL HISTORY:  Positive for anxiety.    PAST SURGICAL HISTORY:  Negative.    FAMILY HISTORY:  Positive for hypertension in his father.    SOCIAL HISTORY:  Negative for tobacco use, and he does not consume any alcoholic beverages.    MEDICATIONS:  None.    REVIEW OF SYSTEMS:  HEENT:  Negative for headache, double vision, or blurred vision.  CARDIOVASCULAR:  Negative for chest pain, arrhythmia, or palpitations.  RESPIRATORY:  Negative for shortness of breath, asthma, bronchitis, or pneumonia.  GASTROINTESTINAL:  Negative for heartburn, nausea, vomiting, diarrhea, or constipation.  GENITOURINARY:  Negative for hematuria or dysuria.  HEMATOLOGIC:  Negative for easy bleeding or bruising.  INFECTIOUS:  Negative for any recent infection.  MUSCULOSKELETAL:  Positive for back pain.  PSYCHIATRIC:  Positive for anxiety.  NEUROLOGIC:  Negative for seizure  or stroke.  ENDOCRINE:  Negative for thyroid disorder or diabetes.    PHYSICAL EXAMINATION:  VITAL SIGNS:  He is currently afebrile with a T-current of 36.6 degrees Celsius, pulse 96, blood pressure is 129/73, respiratory rate is 19.  GENERAL:  He is resting in bed, appears to be in mild-to-moderate distress secondary to pain.  He appears stated age.  HEENT:  His head is normocephalic and atraumatic.  Pupils are 3-2 mm and reactive.  He has no drainage out of his eyes, ears, nose, or throat.  SKIN:  His skin is warm and dry.  MUSCULOSKELETAL:  He has good range of motion of his bilateral upper and lower extremities.  ABDOMEN:  Soft, nontender, nondistended.  RESPIRATORY:  He is not using any accessory muscles of respiration.  NEUROLOGIC:  Rest of his neurologic exam, he is awake, alert, and oriented x3.  Cranial nerves 2 through 12 are intact bilaterally.  Motor exam reveals 5/5 strength in his bilateral upper and lower extremities.  Sensation is grossly intact to light touch.  Reflexes are 1+ and symmetric.  Toes are going down.    LABORATORY VALUES:  He has sodium 131, potassium 3.6, BUN is 16, creatinine 1.6.  On review of imaging, he does not have any new imaging for me to review at this time.    ASSESSMENT AND PLAN:  The patient is a 64-year-old gentleman with back pain and reports of compression fracture.  He is neurologically stable.  We will get MRIs of his thoracic and lumbar spine.          DERIAN RICHMOND MD      D:  05/24/2024 22:12:28     T:  05/24/2024 22:42:24     KU/JESSICA  Job #:  [id number]     Doc#:  [id number]

## 2024-05-25 NOTE — PLAN OF CARE
Problem: Discharge Planning  Goal: Discharge to home or other facility with appropriate resources  5/25/2024 1526 by Shanta Rogers RN  Outcome: Progressing  5/25/2024 0317 by Jacques Mak RN  Outcome: Progressing     Problem: Pain  Goal: Verbalizes/displays adequate comfort level or baseline comfort level  5/25/2024 1526 by Shanta Rogers RN  Outcome: Progressing  5/25/2024 0317 by Jacques Mak RN  Outcome: Progressing     Problem: Safety - Adult  Goal: Free from fall injury  5/25/2024 0317 by Jacques Mak, RN  Outcome: Progressing

## 2024-05-25 NOTE — H&P
Hospital Medicine History & Physical      PCP: John Nuno APRN - CNP    Date of Admission: 5/24/2024    Date of Service: .MAY 25, 2024    Chief Complaint:   LOW BACK PAIN      History Of Present Illness:     64 y.o. male presented with LOW BACK PAIN.  HE IS VERY [place] AND A POOR HISTORIAN.  HE STATES FOR THE PAST 3 WEEKS HE HAS HAD SEVERE LBP AND DIFFICULTY WALKING.  HIS   GIRLFRIEND WAS RUBBING HIS LOWER BACK WITH A PAIN CREAM AND IT DID NOT WORK. HE DENIES FALLING      Past Medical History:          Diagnosis Date    Anxiety        Past Surgical History:      No past surgical history on file.    Medications Prior to Admission:      Prior to Admission medications    Medication Sig Start Date End Date Taking? Authorizing Provider   hydrOXYzine pamoate (VISTARIL) 25 MG capsule take 1 capsule by mouth three times a day if needed for itching or anxiety 12/13/22   John Nuno APRN - CNP   potassium chloride (KLOR-CON M) 10 MEQ extended release tablet take 1 tablet by mouth once daily  Patient not taking: Reported on 5/24/2024 9/21/22   John Nuno APRN - CNP   hydroCHLOROthiazide (HYDRODIURIL) 25 MG tablet Take 1 tablet by mouth daily 9/21/22 3/20/23  John Nuno APRN - CNP   escitalopram (LEXAPRO) 10 MG tablet Take 1 tablet by mouth daily  Patient not taking: Reported on 5/24/2024 3/23/22   John Nuno APRN - CNP   traZODone (DESYREL) 100 MG tablet take 1 tablet by mouth every evening  Patient not taking: Reported on 2/9/2022 1/18/22   Mona Campbell MD       Allergies:  Patient has no known allergies.    Social History:      The patient currently lives GIRLFRIEND    TOBACCO:   reports that he has never smoked. He has never used smokeless tobacco.  ETOH:   reports no history of alcohol use.      Family History:       Reviewed in detail and negative for DM, CAD,  bone lesions on xray [M89.9] 05/25/2024    Hypercalcemia [E83.52] 05/24/2024    Acute right-sided low back pain with sciatica [M54.40] 05/24/2024    Essential hypertension [I10] 10/22/2020   DEPRESSION      PLAN:    LYTIC BONE LESIONS  SPEP  UMRIPEP     HYPERCALCEMIA  NEPHROLOGY CONSULT  NS   CALCITONIN   PTH  IONIZED CALCIUM     HTN  MONITOR PRESSURE  HOLD HCTZ BC OF HYPERCALCEMIA    DEPRESSION/ANXIETY  VISTARIL    DVT Prophylaxis: LOVENOX  Diet: ADULT DIET; Regular  Code Status: Full Code    PT/OT Eval Status:  ORDERED    Dispo -  HOME    Electronically signed by Gibson Ng DO on 5/25/2024 at 1:19 PM FACOI       Thank you John Nuno APRN - YISSEL for the opportunity to be involved in this patient's care. If you have any questions or concerns please feel free to contact me at [phone]

## 2024-05-25 NOTE — PROGRESS NOTES
4 Eyes Skin Assessment     NAME:  Derek Alvarado  YOB: 1960  MEDICAL RECORD NUMBER:  [id number]    The patient is being assessed for  Admission    I agree that at least one RN has performed a thorough Head to Toe Skin Assessment on the patient. ALL assessment sites listed below have been assessed.      Areas assessed by both nurses:    Head, Face, Ears, Shoulders, Back, Chest, Arms, Elbows, Hands, Sacrum. Buttock, Coccyx, Ischium, Legs. Feet and Heels, and Under Medical Devices         Does the Patient have a Wound? No noted wound(s)       Tip Prevention initiated by RN: Yes  Wound Care Orders initiated by RN: No    Pressure Injury (Stage 3,4, Unstageable, DTI, NWPT, and Complex wounds) if present, place Wound referral order by RN under : No    New Ostomies, if present place, Ostomy referral order under : No     Nurse 1 eSignature: Electronically signed by Chance Mak RN on 5/24/24 at 10:06 PM EDT    **SHARE this note so that the co-signing nurse can place an eSignature**    Nurse 2 eSignature: Electronically signed by Elsa Pat LPN on 5/25/24 at 7:28 AM EDT

## 2024-05-26 ENCOUNTER — APPOINTMENT (OUTPATIENT)
Dept: GENERAL RADIOLOGY | Age: 64
End: 2024-05-26
Payer: MEDICAID

## 2024-05-26 PROBLEM — D53.9 MACROCYTIC ANEMIA: Status: ACTIVE | Noted: 2024-05-26

## 2024-05-26 LAB
ALBUMIN SERPL-MCNC: 3.2 G/DL (ref 3.5–5.2)
ALP SERPL-CCNC: 53 U/L (ref 40–129)
ALT SERPL-CCNC: 5 U/L (ref 0–40)
ANION GAP SERPL CALCULATED.3IONS-SCNC: 12 MMOL/L (ref 7–16)
AST SERPL-CCNC: 14 U/L (ref 0–39)
BASOPHILS # BLD: 0 K/UL (ref 0–0.2)
BASOPHILS NFR BLD: 0 % (ref 0–2)
BILIRUB SERPL-MCNC: 1.3 MG/DL (ref 0–1.2)
BUN SERPL-MCNC: 26 MG/DL (ref 6–23)
CA-I BLD-SCNC: 1.48 MMOL/L (ref 1.15–1.33)
CALCIUM SERPL-MCNC: 11.3 MG/DL (ref 8.6–10.2)
CHLORIDE SERPL-SCNC: 100 MMOL/L (ref 98–107)
CO2 SERPL-SCNC: 17 MMOL/L (ref 22–29)
CREAT SERPL-MCNC: 1.8 MG/DL (ref 0.7–1.2)
EOSINOPHIL # BLD: 0 K/UL (ref 0.05–0.5)
EOSINOPHILS RELATIVE PERCENT: 0 % (ref 0–6)
ERYTHROCYTE [DISTWIDTH] IN BLOOD BY AUTOMATED COUNT: 13.8 % (ref 11.5–15)
GFR, ESTIMATED: 43 ML/MIN/1.73M2
GLUCOSE SERPL-MCNC: 114 MG/DL (ref 74–99)
HAPTOGLOB SERPL-MCNC: 56 MG/DL (ref 30–200)
HCT VFR BLD AUTO: 22 % (ref 37–54)
HGB BLD-MCNC: 7.3 G/DL (ref 12.5–16.5)
IGA SERPL-MCNC: <50 MG/DL (ref 70–400)
IGG SERPL-MCNC: 4001 MG/DL (ref 700–1600)
IGM SERPL-MCNC: <25 MG/DL (ref 40–230)
IMM RETICS NFR: 14.7 % (ref 2.3–13.4)
LDH SERPL-CCNC: 240 U/L (ref 135–225)
LYMPHOCYTES NFR BLD: 0.51 K/UL (ref 1.5–4)
LYMPHOCYTES RELATIVE PERCENT: 13 % (ref 20–42)
MCH RBC QN AUTO: 33.6 PG (ref 26–35)
MCHC RBC AUTO-ENTMCNC: 33.2 G/DL (ref 32–34.5)
MCV RBC AUTO: 101.4 FL (ref 80–99.9)
MONOCYTES NFR BLD: 0.14 K/UL (ref 0.1–0.95)
MONOCYTES NFR BLD: 4 % (ref 2–12)
NEUTROPHILS NFR BLD: 84 % (ref 43–80)
NEUTS SEG NFR BLD: 3.26 K/UL (ref 1.8–7.3)
PLATELET, FLUORESCENCE: 168 K/UL (ref 130–450)
PMV BLD AUTO: 8.9 FL (ref 7–12)
POTASSIUM SERPL-SCNC: 4 MMOL/L (ref 3.5–5)
PROT SERPL-MCNC: 8.1 G/DL (ref 6.4–8.3)
RBC # BLD AUTO: 2.17 M/UL (ref 3.8–5.8)
RBC # BLD: ABNORMAL 10*6/UL
RETIC HEMOGLOBIN: 38 PG (ref 28.2–36.6)
RETICS # AUTO: 0.02 M/UL
RETICS/RBC NFR AUTO: 1 % (ref 0.4–1.9)
SODIUM SERPL-SCNC: 129 MMOL/L (ref 132–146)
WBC OTHER # BLD: 3.9 K/UL (ref 4.5–11.5)

## 2024-05-26 PROCEDURE — 6370000000 HC RX 637 (ALT 250 FOR IP): Performed by: INTERNAL MEDICINE

## 2024-05-26 PROCEDURE — 6370000000 HC RX 637 (ALT 250 FOR IP): Performed by: STUDENT IN AN ORGANIZED HEALTH CARE EDUCATION/TRAINING PROGRAM

## 2024-05-26 PROCEDURE — 82232 ASSAY OF BETA-2 PROTEIN: CPT

## 2024-05-26 PROCEDURE — 86334 IMMUNOFIX E-PHORESIS SERUM: CPT

## 2024-05-26 PROCEDURE — 85025 COMPLETE CBC W/AUTO DIFF WBC: CPT

## 2024-05-26 PROCEDURE — 1200000000 HC SEMI PRIVATE

## 2024-05-26 PROCEDURE — 83010 ASSAY OF HAPTOGLOBIN QUANT: CPT

## 2024-05-26 PROCEDURE — 6360000002 HC RX W HCPCS: Performed by: FAMILY MEDICINE

## 2024-05-26 PROCEDURE — 83615 LACTATE (LD) (LDH) ENZYME: CPT

## 2024-05-26 PROCEDURE — 80053 COMPREHEN METABOLIC PANEL: CPT

## 2024-05-26 PROCEDURE — 83521 IG LIGHT CHAINS FREE EACH: CPT

## 2024-05-26 PROCEDURE — 6370000000 HC RX 637 (ALT 250 FOR IP): Performed by: NURSE PRACTITIONER

## 2024-05-26 PROCEDURE — 82784 ASSAY IGA/IGD/IGG/IGM EACH: CPT

## 2024-05-26 PROCEDURE — 99232 SBSQ HOSP IP/OBS MODERATE 35: CPT | Performed by: STUDENT IN AN ORGANIZED HEALTH CARE EDUCATION/TRAINING PROGRAM

## 2024-05-26 PROCEDURE — 6360000002 HC RX W HCPCS: Performed by: STUDENT IN AN ORGANIZED HEALTH CARE EDUCATION/TRAINING PROGRAM

## 2024-05-26 PROCEDURE — 82330 ASSAY OF CALCIUM: CPT

## 2024-05-26 PROCEDURE — 99232 SBSQ HOSP IP/OBS MODERATE 35: CPT | Performed by: NEUROLOGICAL SURGERY

## 2024-05-26 PROCEDURE — 6360000002 HC RX W HCPCS: Performed by: INTERNAL MEDICINE

## 2024-05-26 PROCEDURE — 71045 X-RAY EXAM CHEST 1 VIEW: CPT

## 2024-05-26 PROCEDURE — 85045 AUTOMATED RETICULOCYTE COUNT: CPT

## 2024-05-26 PROCEDURE — 36415 COLL VENOUS BLD VENIPUNCTURE: CPT

## 2024-05-26 RX ADMIN — HYDROMORPHONE HYDROCHLORIDE 0.25 MG: 1 INJECTION, SOLUTION INTRAMUSCULAR; INTRAVENOUS; SUBCUTANEOUS at 13:49

## 2024-05-26 RX ADMIN — HYDROXYZINE PAMOATE 25 MG: 25 CAPSULE ORAL at 20:38

## 2024-05-26 RX ADMIN — HYDROXYZINE PAMOATE 25 MG: 25 CAPSULE ORAL at 05:26

## 2024-05-26 RX ADMIN — FOLIC ACID 1 MG: 1 TABLET ORAL at 08:34

## 2024-05-26 RX ADMIN — HYDROMORPHONE HYDROCHLORIDE 0.25 MG: 1 INJECTION, SOLUTION INTRAMUSCULAR; INTRAVENOUS; SUBCUTANEOUS at 21:44

## 2024-05-26 RX ADMIN — CYANOCOBALAMIN 1000 MCG: 1000 INJECTION, SOLUTION INTRAMUSCULAR; SUBCUTANEOUS at 08:34

## 2024-05-26 RX ADMIN — ENOXAPARIN SODIUM 40 MG: 100 INJECTION SUBCUTANEOUS at 08:34

## 2024-05-26 ASSESSMENT — PAIN DESCRIPTION - LOCATION
LOCATION: RIB CAGE
LOCATION: BACK
LOCATION: BACK

## 2024-05-26 ASSESSMENT — PAIN - FUNCTIONAL ASSESSMENT
PAIN_FUNCTIONAL_ASSESSMENT: PREVENTS OR INTERFERES SOME ACTIVE ACTIVITIES AND ADLS
PAIN_FUNCTIONAL_ASSESSMENT: PREVENTS OR INTERFERES WITH MANY ACTIVE NOT PASSIVE ACTIVITIES
PAIN_FUNCTIONAL_ASSESSMENT: PREVENTS OR INTERFERES WITH MANY ACTIVE NOT PASSIVE ACTIVITIES

## 2024-05-26 ASSESSMENT — PAIN DESCRIPTION - DESCRIPTORS
DESCRIPTORS: ACHING;THROBBING;SHOOTING
DESCRIPTORS: ACHING;THROBBING;SORE
DESCRIPTORS: THROBBING;STABBING;SORE

## 2024-05-26 ASSESSMENT — PAIN SCALES - GENERAL
PAINLEVEL_OUTOF10: 4
PAINLEVEL_OUTOF10: 0
PAINLEVEL_OUTOF10: 2
PAINLEVEL_OUTOF10: 5
PAINLEVEL_OUTOF10: 10

## 2024-05-26 ASSESSMENT — PAIN DESCRIPTION - ONSET
ONSET: SUDDEN
ONSET: SUDDEN

## 2024-05-26 ASSESSMENT — PAIN DESCRIPTION - ORIENTATION
ORIENTATION: LOWER
ORIENTATION: LEFT
ORIENTATION: LOWER

## 2024-05-26 ASSESSMENT — PAIN DESCRIPTION - PAIN TYPE
TYPE: CHRONIC PAIN
TYPE: CHRONIC PAIN

## 2024-05-26 ASSESSMENT — PAIN DESCRIPTION - FREQUENCY
FREQUENCY: INTERMITTENT
FREQUENCY: INTERMITTENT

## 2024-05-26 ASSESSMENT — PAIN SCALES - WONG BAKER: WONGBAKER_NUMERICALRESPONSE: NO HURT

## 2024-05-26 NOTE — PROGRESS NOTES
[facility]    Inpatient Medical Oncology/Hematology Consult Note      Patient Name: Derek Alvarado  YOB: 1960    DATE OF ADMISSION: 5/24/2024  DATE OF CONSULTATION: 5/25/2024  CONSULTING PROVIDER: Adelaida Talley MD  REASON FOR CONSULTATION: \"Anemia, hypercalcemia, rule out multiple myeloma\"  PCP: John Nuno    Room: [address] [place], [place] [address]      CHIEF COMPLAINT:  Back pain      Subjective:  No major changes. Still has back pain and right leg cramping.     HPI from Initial Inpatient Consultation  (5/25/2024):   Patient is a 64 y.o. male comes in for back pain. He initially presented to [facility] with these symptoms that started in recent days. Also complains of right lower extremity cramping with some degree of weakness. He was somewhat hard of hearing. Patient c/o right arm tingling and some weakness.  Apart from his lower back he does not have significant pain else where including the neck.    Upon admission, labs found his Hgb was 8.1 with Hgb; calcium was 12.2. Creatinine was 1.6.         ROS: Negative except as noted above.             Past Medical History:   Diagnosis Date    Anxiety        No past surgical history on file.    Social History     Socioeconomic History    Marital status: Single   Tobacco Use    Smoking status: Never    Smokeless tobacco: Never   Vaping Use    Vaping Use: Never used   Substance and Sexual Activity    Alcohol use: Never    Drug use: Never     Social Determinants of Health     Financial Resource Strain: Low Risk  (1/26/2022)    Overall Financial Resource Strain (CARDIA)     Difficulty of Paying Living Expenses: Not hard at all   Food Insecurity: No Food Insecurity (1/26/2022)    Hunger Vital Sign     Worried About Running Out of Food in the Last Year: Never true     Ran Out of Food in the Last Year: Never true   Physical Activity: Inactive (1/26/2022)    Exercise Vital Sign     Days of Exercise per Week: 0 days     Minutes of Exercise per       General: He is not in acute distress.     Appearance: He is not ill-appearing or toxic-appearing.   HENT:      Head: Normocephalic.      Nose: Nose normal.      Mouth/Throat:      Mouth: Mucous membranes are moist.   Eyes:      General: No scleral icterus.     Extraocular Movements: Extraocular movements intact.   Pulmonary:      Effort: No respiratory distress.      Breath sounds: No stridor.   Abdominal:      General: There is no distension.      Palpations: Abdomen is soft. There is no mass.   Musculoskeletal:         General: No swelling, deformity or signs of injury.      Cervical back: Normal range of motion.      Right lower leg: No edema.      Left lower leg: No edema.      Comments: Right lower extremity weak   Skin:     Coloration: Skin is not jaundiced or pale.      Findings: No lesion or rash.   Neurological:      General: No focal deficit present.      Mental Status: He is alert and oriented to person, place, and time.      Motor: Weakness (right lower extremity weakness) present.   Psychiatric:         Mood and Affect: Mood normal.         Behavior: Behavior normal.         Thought Content: Thought content normal.          ECOG PS: 1-2          Intake/Output Summary (Last 24 hours) at 5/26/2024 1408  Last data filed at 5/26/2024 0830  Gross per 24 hour   Intake 120 ml   Output 300 ml   Net -180 ml             DIAGNOSTIC DATA:   Lab Results   Component Value Date    WBC 3.9 (L) 05/26/2024    HGB 7.3 (L) 05/26/2024    HCT 22.0 (L) 05/26/2024    .4 (H) 05/26/2024     05/25/2024     Lab Results   Component Value Date    NEUTROABS 3.26 05/26/2024     Lab Results   Component Value Date    ALT 5 05/26/2024    AST 14 05/26/2024    ALKPHOS 53 05/26/2024    BILITOT 1.3 (H) 05/26/2024     Lab Results   Component Value Date    CREATININE 1.8 (H) 05/26/2024    BUN 26 (H) 05/26/2024     (L) 05/26/2024    K 4.0 05/26/2024     05/26/2024    CO2 17 (L) 05/26/2024       Pathology:

## 2024-05-26 NOTE — PROGRESS NOTES
Department of Internal Medicine  Nephrology Attending Progress Note    Events reviewed     SUBJECTIVE: we are following Mr Alvarado for hypercalcemia and elevated creatinine. Complaining of back pain.     Current Medications:    Current Facility-Administered Medications: HYDROmorphone (DILAUDID) injection 0.25 mg, 0.25 mg, IntraVENous, Q4H PRN  oxyCODONE-acetaminophen (PERCOCET) 5-325 MG per tablet 1 tablet, 1 tablet, Oral, Q6H PRN  hydrALAZINE (APRESOLINE) injection 5 mg, 5 mg, IntraVENous, Q6H PRN  cyclobenzaprine (FLEXERIL) tablet 10 mg, 10 mg, Oral, TID PRN  folic acid (FOLVITE) tablet 1 mg, 1 mg, Oral, Daily  cyanocobalamin injection 1,000 mcg, 1,000 mcg, IntraMUSCular, Daily  sennosides-docusate sodium (SENOKOT-S) 8.6-50 MG tablet 2 tablet, 2 tablet, Oral, Daily PRN  enoxaparin (LOVENOX) injection 40 mg, 40 mg, SubCUTAneous, Daily  ondansetron (ZOFRAN-ODT) disintegrating tablet 4 mg, 4 mg, Oral, Q8H PRN **OR** ondansetron (ZOFRAN) injection 4 mg, 4 mg, IntraVENous, Q6H PRN  0.9 % sodium chloride infusion, , IntraVENous, Continuous  hydrOXYzine pamoate (VISTARIL) capsule 25 mg, 25 mg, Oral, Q6H PRN    PHYSICAL EXAM:      Vitals:    VITALS:  BP (!) 147/85   Pulse 95   Temp 97.9 °F (36.6 °C) (Temporal)   Resp 20   Wt 68 kg (150 lb)   SpO2 93%   BMI 22.15 kg/m²   24HR INTAKE/OUTPUT:    Intake/Output Summary (Last 24 hours) at 5/26/2024 0847  Last data filed at 5/25/2024 1907  Gross per 24 hour   Intake 300 ml   Output 500 ml   Net -200 ml         Constitutional: Patient is awake, alert, in no distress  HEENT: Pupils equal reactive  Respiratory: Lungs are clear  Cardiovascular/Edema: Heart sounds are regular  Gastrointestinal: Abdomen soft  Neurologic: No focal  Skin: No lesions  Other: No edema    DATA:    CBC:   Lab Results   Component Value Date/Time    WBC 3.9 05/26/2024 05:09 AM    RBC 2.17 05/26/2024 05:09 AM    HGB 7.3 05/26/2024 05:09 AM    HCT 22.0 05/26/2024 05:09 AM    .4 05/26/2024 05:09 AM

## 2024-05-26 NOTE — PROGRESS NOTES
Department of Neurosurgery  Attending Progress Note    CHIEF COMPLAINT: back pain    SUBJECTIVE:  seen for back pain.  He has a subacute T12 fracture    ROS:  HEENT: negative for chest pain  Resp: negative for SOB  GI: negative for heartburn    OBJECTIVE  Physical  VITALS:  BP (!) 150/83   Pulse 95   Temp 98 °F (36.7 °C) (Temporal)   Resp 20   Wt 68 kg (150 lb)   SpO2 93%   BMI 22.15 kg/m²   NEUROLOGIC:  Mental Status Exam:  Level of Alertness:   awake  Orientation:   person, place, time  Motor Exam:  Motor exam is symmetrical 5 out of 5 all extremities bilaterally  Sensory:  Sensory intact    Data  CBC:   Lab Results   Component Value Date/Time    WBC 3.9 05/26/2024 05:09 AM    RBC 2.17 05/26/2024 05:09 AM    HGB 7.3 05/26/2024 05:09 AM    HCT 22.0 05/26/2024 05:09 AM    .4 05/26/2024 05:09 AM    MCH 33.6 05/26/2024 05:09 AM    MCHC 33.2 05/26/2024 05:09 AM    RDW 13.8 05/26/2024 05:09 AM     05/25/2024 07:27 AM    MPV 8.9 05/26/2024 05:09 AM     BMP:    Lab Results   Component Value Date/Time     05/26/2024 05:09 AM    K 4.0 05/26/2024 05:09 AM     05/26/2024 05:09 AM    CO2 17 05/26/2024 05:09 AM    BUN 26 05/26/2024 05:09 AM    CREATININE 1.8 05/26/2024 05:09 AM    CALCIUM 11.3 05/26/2024 05:09 AM    GFRAA >60 07/27/2021 07:55 AM    LABGLOM 43 05/26/2024 05:09 AM    GLUCOSE 114 05/26/2024 05:09 AM     Current Inpatient Medications  Current Facility-Administered Medications: HYDROmorphone (DILAUDID) injection 0.25 mg, 0.25 mg, IntraVENous, Q4H PRN  oxyCODONE-acetaminophen (PERCOCET) 5-325 MG per tablet 1 tablet, 1 tablet, Oral, Q6H PRN  hydrALAZINE (APRESOLINE) injection 5 mg, 5 mg, IntraVENous, Q6H PRN  cyclobenzaprine (FLEXERIL) tablet 10 mg, 10 mg, Oral, TID PRN  folic acid (FOLVITE) tablet 1 mg, 1 mg, Oral, Daily  cyanocobalamin injection 1,000 mcg, 1,000 mcg, IntraMUSCular, Daily  sennosides-docusate sodium (SENOKOT-S) 8.6-50 MG tablet 2 tablet, 2 tablet, Oral, Daily

## 2024-05-26 NOTE — PROGRESS NOTES
4 Eyes Skin Assessment     NAME:  Derek Alvarado  YOB: 1960  MEDICAL RECORD NUMBER:  [id number]    The patient is being assessed for  Admission    I agree that at least one RN has performed a thorough Head to Toe Skin Assessment on the patient. ALL assessment sites listed below have been assessed.      Areas assessed by both nurses:    Head, Face, Ears, Shoulders, Back, Chest, Arms, Elbows, Hands, Sacrum. Buttock, Coccyx, Ischium, and Legs. Feet and Heels        Does the Patient have a Wound? No noted wound(s)       Tip Prevention initiated by RN: No  Wound Care Orders initiated by RN: No    Pressure Injury (Stage 3,4, Unstageable, DTI, NWPT, and Complex wounds) if present, place Wound referral order by RN under : No    New Ostomies, if present place, Ostomy referral order under : No     Nurse 1 eSignature: Electronically signed by Joy Lombardi RN on 5/25/24 at 8:39 PM EDT    **SHARE this note so that the co-signing nurse can place an eSignature**    Nurse 2 eSignature: {Esignature:[id number]}

## 2024-05-26 NOTE — PROGRESS NOTES
Patient was unable to tolerate bone survey exam, said that he could not lay flat. He was able to tolerate chest x-ray obtained in cart while sitting up. Did not want to continue with bone survey. Floor will call if patient wants to obtain bone survey in the future.

## 2024-05-26 NOTE — PROGRESS NOTES
Hospitalist Progress Note      PCP: John Nuno APRN - CNP    Date of Admission: 5/24/2024        Hospital Course:  64 y.o. male presented with LOW BACK PAIN.  HE IS VERY [place] AND A POOR HISTORIAN.  HE STATES FOR THE PAST 3 WEEKS HE HAS HAD SEVERE LBP AND DIFFICULTY WALKING.  HIS   GIRLFRIEND WAS RUBBING HIS LOWER BACK WITH A PAIN CREAM AND IT DID NOT WORK. HE DENIES FALLING       5/26 FOR [facility]    Subjective: WANTS TO GET UP           Medications:  Reviewed    Infusion Medications    sodium chloride 100 mL/hr at 05/25/24 2255     Scheduled Medications    folic acid  1 mg Oral Daily    cyanocobalamin  1,000 mcg IntraMUSCular Daily    enoxaparin  40 mg SubCUTAneous Daily     PRN Meds: HYDROmorphone, oxyCODONE-acetaminophen, hydrALAZINE, cyclobenzaprine, sennosides-docusate sodium, ondansetron **OR** ondansetron, hydrOXYzine pamoate      Intake/Output Summary (Last 24 hours) at 5/26/2024 1503  Last data filed at 5/26/2024 0830  Gross per 24 hour   Intake 120 ml   Output 300 ml   Net -180 ml       Exam:    BP (!) 147/85   Pulse 92   Temp 97.9 °F (36.6 °C) (Temporal)   Resp 17   Wt 68 kg (150 lb)   SpO2 93%   BMI 22.15 kg/m²         eneral appearance:  No apparent distress, appears stated age.  HEENT:  Normal cephalic, [place]  Neck: Supple, with full range of motion. No jugular venous distention. Trachea midline.  Respiratory:  Normal respiratory effort. Clear to auscultation,   Cardiovascular:  RRR  Abdomen: Soft, non-tender, non-distended with normal bowel sounds.  Musculoskeletal:  No clubbing, cyanosis or edema bilaterally.    Skin: smooth and dry   Neurologic:   Cranial nerves: II-XII intact,   Psychiatric:  Alert and oriented x 3                     Labs:   Recent Labs     05/24/24  1437 05/25/24  0727 05/26/24  0509   WBC 3.0* 3.1* 3.9*   HGB 8.1* 7.5* 7.3*   HCT 23.3* 22.8* 22.0*    171  --      Recent Labs     05/24/24  1437 05/25/24  0727 05/26/24  0509   * 136 129*   K 3.6 3.7  4.0   CL 97* 102 100   CO2 21* 20* 17*   BUN 16 17 26*   CREATININE 1.6* 1.6* 1.8*   CALCIUM 12.2* 11.7* 11.3*   PHOS  --  3.1  --      Recent Labs     05/24/24  1437 05/25/24  0727 05/26/24  0509   AST 11 13 14   ALT <5 6 5   BILITOT 1.0 0.9 1.3*   ALKPHOS 60 55 53     Recent Labs     05/24/24  1437   INR 1.4     No results for input(s): \"CKTOTAL\", \"TROPONINI\" in the last 72 hours.  Recent Labs     05/24/24  1437 05/25/24  0727 05/26/24  0509   AST 11 13 14   ALT <5 6 5   BILITOT 1.0 0.9 1.3*   ALKPHOS 60 55 53     No results for input(s): \"LACTA\" in the last 72 hours.  No results found for: \"LABURIC\", \"URICACID\"  No results found for: \"AMMONIA\"    Assessment:    Active Hospital Problems    Diagnosis Date Noted    Macrocytic anemia [D53.9] 05/26/2024    Lytic bone lesions on xray [M89.9] 05/25/2024    Palliative care encounter [Z51.5] 05/25/2024    Goals of care, counseling/discussion [Z71.89] 05/25/2024    Hypercalcemia [E83.52] 05/24/2024    Acute right-sided low back pain with sciatica [M54.40] 05/24/2024    Essential hypertension [I10] 10/22/2020   DEPRESSION        PLAN:     LYTIC BONE LESIONS  SPEP  UMRIPEP     HYPERCALCEMIA  NEPHROLOGY CONSULT  NS   CALCITONIN   PTH  IONIZED CALCIUM      HTN  MONITOR PRESSURE  HOLD HCTZ BC OF HYPERCALCEMIA     DEPRESSION/ANXIETY  VISTARIL     DVT Prophylaxis: LOVENOX  Diet: ADULT DIET; Regular  Code Status: Full Code     PT/OT Eval Status:  ORDERED     Dispo -  HOME  Electronically signed by Gibson Ng DO on 5/26/2024 at 3:03 PM FACOI

## 2024-05-27 ENCOUNTER — APPOINTMENT (OUTPATIENT)
Dept: GENERAL RADIOLOGY | Age: 64
End: 2024-05-27
Payer: MEDICAID

## 2024-05-27 PROBLEM — C90.00 MULTIPLE MYELOMA NOT HAVING ACHIEVED REMISSION (HCC): Status: ACTIVE | Noted: 2024-05-27

## 2024-05-27 LAB
ALBUMIN SERPL-MCNC: 3 G/DL (ref 3.5–5.2)
ALP SERPL-CCNC: 50 U/L (ref 40–129)
ALT SERPL-CCNC: 5 U/L (ref 0–40)
ANION GAP SERPL CALCULATED.3IONS-SCNC: 14 MMOL/L (ref 7–16)
AST SERPL-CCNC: 13 U/L (ref 0–39)
BASOPHILS # BLD: 0 K/UL (ref 0–0.2)
BASOPHILS NFR BLD: 0 % (ref 0–2)
BILIRUB SERPL-MCNC: 1.1 MG/DL (ref 0–1.2)
BUN SERPL-MCNC: 25 MG/DL (ref 6–23)
CA-I BLD-SCNC: 1.49 MMOL/L (ref 1.15–1.33)
CALCIUM SERPL-MCNC: 11.2 MG/DL (ref 8.6–10.2)
CHLORIDE SERPL-SCNC: 103 MMOL/L (ref 98–107)
CO2 SERPL-SCNC: 18 MMOL/L (ref 22–29)
CREAT SERPL-MCNC: 1.7 MG/DL (ref 0.7–1.2)
CREAT UR-MCNC: 38.8 MG/DL (ref 40–278)
EOSINOPHIL # BLD: 0.03 K/UL (ref 0.05–0.5)
EOSINOPHILS RELATIVE PERCENT: 1 % (ref 0–6)
ERYTHROCYTE [DISTWIDTH] IN BLOOD BY AUTOMATED COUNT: 14 % (ref 11.5–15)
GFR, ESTIMATED: 45 ML/MIN/1.73M2
GLUCOSE SERPL-MCNC: 85 MG/DL (ref 74–99)
HCT VFR BLD AUTO: 21.4 % (ref 37–54)
HGB BLD-MCNC: 7 G/DL (ref 12.5–16.5)
IMM GRANULOCYTES # BLD AUTO: 0.05 K/UL (ref 0–0.58)
IMM GRANULOCYTES NFR BLD: 2 % (ref 0–5)
LYMPHOCYTES NFR BLD: 0.62 K/UL (ref 1.5–4)
LYMPHOCYTES RELATIVE PERCENT: 20 % (ref 20–42)
MAGNESIUM SERPL-MCNC: 1.5 MG/DL (ref 1.6–2.6)
MCH RBC QN AUTO: 33.3 PG (ref 26–35)
MCHC RBC AUTO-ENTMCNC: 32.7 G/DL (ref 32–34.5)
MCV RBC AUTO: 101.9 FL (ref 80–99.9)
MONOCYTES NFR BLD: 0.37 K/UL (ref 0.1–0.95)
MONOCYTES NFR BLD: 12 % (ref 2–12)
NEUTROPHILS NFR BLD: 65 % (ref 43–80)
NEUTS SEG NFR BLD: 1.97 K/UL (ref 1.8–7.3)
PH VENOUS: 7.42 (ref 7.35–7.45)
PLATELET # BLD AUTO: 142 K/UL (ref 130–450)
PMV BLD AUTO: 9.2 FL (ref 7–12)
POTASSIUM SERPL-SCNC: 3.2 MMOL/L (ref 3.5–5)
PROT SERPL-MCNC: 7.7 G/DL (ref 6.4–8.3)
RBC # BLD AUTO: 2.1 M/UL (ref 3.8–5.8)
SODIUM SERPL-SCNC: 135 MMOL/L (ref 132–146)
TOTAL PROTEIN, URINE: 5 MG/DL (ref 0–12)
URINE TOTAL PROTEIN CREATININE RATIO: 0.13 (ref 0–0.2)
WBC OTHER # BLD: 3 K/UL (ref 4.5–11.5)

## 2024-05-27 PROCEDURE — 36415 COLL VENOUS BLD VENIPUNCTURE: CPT

## 2024-05-27 PROCEDURE — 80053 COMPREHEN METABOLIC PANEL: CPT

## 2024-05-27 PROCEDURE — 84156 ASSAY OF PROTEIN URINE: CPT

## 2024-05-27 PROCEDURE — 6370000000 HC RX 637 (ALT 250 FOR IP): Performed by: INTERNAL MEDICINE

## 2024-05-27 PROCEDURE — 6370000000 HC RX 637 (ALT 250 FOR IP)

## 2024-05-27 PROCEDURE — 6360000002 HC RX W HCPCS: Performed by: FAMILY MEDICINE

## 2024-05-27 PROCEDURE — 1200000000 HC SEMI PRIVATE

## 2024-05-27 PROCEDURE — 99232 SBSQ HOSP IP/OBS MODERATE 35: CPT | Performed by: NURSE PRACTITIONER

## 2024-05-27 PROCEDURE — 6360000002 HC RX W HCPCS: Performed by: INTERNAL MEDICINE

## 2024-05-27 PROCEDURE — 82570 ASSAY OF URINE CREATININE: CPT

## 2024-05-27 PROCEDURE — 82800 BLOOD PH: CPT

## 2024-05-27 PROCEDURE — 83735 ASSAY OF MAGNESIUM: CPT

## 2024-05-27 PROCEDURE — 6360000002 HC RX W HCPCS: Performed by: STUDENT IN AN ORGANIZED HEALTH CARE EDUCATION/TRAINING PROGRAM

## 2024-05-27 PROCEDURE — 85025 COMPLETE CBC W/AUTO DIFF WBC: CPT

## 2024-05-27 PROCEDURE — 77075 RADEX OSSEOUS SURVEY COMPL: CPT

## 2024-05-27 PROCEDURE — 82330 ASSAY OF CALCIUM: CPT

## 2024-05-27 PROCEDURE — 99232 SBSQ HOSP IP/OBS MODERATE 35: CPT | Performed by: INTERNAL MEDICINE

## 2024-05-27 PROCEDURE — 6370000000 HC RX 637 (ALT 250 FOR IP): Performed by: STUDENT IN AN ORGANIZED HEALTH CARE EDUCATION/TRAINING PROGRAM

## 2024-05-27 PROCEDURE — 6360000002 HC RX W HCPCS

## 2024-05-27 RX ORDER — POTASSIUM CHLORIDE 20 MEQ/1
40 TABLET, EXTENDED RELEASE ORAL ONCE
Status: COMPLETED | OUTPATIENT
Start: 2024-05-27 | End: 2024-05-27

## 2024-05-27 RX ORDER — MAGNESIUM SULFATE IN WATER 40 MG/ML
2000 INJECTION, SOLUTION INTRAVENOUS ONCE
Status: COMPLETED | OUTPATIENT
Start: 2024-05-27 | End: 2024-05-27

## 2024-05-27 RX ORDER — LORAZEPAM 2 MG/ML
0.5 INJECTION INTRAMUSCULAR ONCE
Status: COMPLETED | OUTPATIENT
Start: 2024-05-27 | End: 2024-05-27

## 2024-05-27 RX ORDER — CALCITONIN SALMON 200 [USP'U]/ML
250 INJECTION, SOLUTION INTRAMUSCULAR; SUBCUTANEOUS EVERY 12 HOURS
Status: COMPLETED | OUTPATIENT
Start: 2024-05-27 | End: 2024-05-28

## 2024-05-27 RX ADMIN — POTASSIUM CHLORIDE 40 MEQ: 1500 TABLET, EXTENDED RELEASE ORAL at 10:13

## 2024-05-27 RX ADMIN — LORAZEPAM 0.5 MG: 2 INJECTION INTRAMUSCULAR; INTRAVENOUS at 13:58

## 2024-05-27 RX ADMIN — FOLIC ACID 1 MG: 1 TABLET ORAL at 10:05

## 2024-05-27 RX ADMIN — CALCITONIN SALMON 250 UNITS: 200 INJECTION, SOLUTION INTRAMUSCULAR; SUBCUTANEOUS at 15:11

## 2024-05-27 RX ADMIN — HYDROMORPHONE HYDROCHLORIDE 0.25 MG: 1 INJECTION, SOLUTION INTRAMUSCULAR; INTRAVENOUS; SUBCUTANEOUS at 13:46

## 2024-05-27 RX ADMIN — CYANOCOBALAMIN 1000 MCG: 1000 INJECTION, SOLUTION INTRAMUSCULAR; SUBCUTANEOUS at 10:13

## 2024-05-27 RX ADMIN — MAGNESIUM SULFATE HEPTAHYDRATE 2000 MG: 40 INJECTION, SOLUTION INTRAVENOUS at 10:24

## 2024-05-27 RX ADMIN — OXYCODONE HYDROCHLORIDE AND ACETAMINOPHEN 1 TABLET: 5; 325 TABLET ORAL at 10:04

## 2024-05-27 ASSESSMENT — PAIN SCALES - GENERAL
PAINLEVEL_OUTOF10: 6
PAINLEVEL_OUTOF10: 0
PAINLEVEL_OUTOF10: 0
PAINLEVEL_OUTOF10: 6
PAINLEVEL_OUTOF10: 0

## 2024-05-27 ASSESSMENT — PAIN SCALES - WONG BAKER
WONGBAKER_NUMERICALRESPONSE: NO HURT

## 2024-05-27 ASSESSMENT — PAIN DESCRIPTION - ORIENTATION
ORIENTATION: LOWER
ORIENTATION: MID

## 2024-05-27 ASSESSMENT — PAIN DESCRIPTION - DESCRIPTORS
DESCRIPTORS: THROBBING;STABBING;SORE
DESCRIPTORS: SORE;PRESSURE;PENETRATING

## 2024-05-27 ASSESSMENT — PAIN DESCRIPTION - LOCATION
LOCATION: BACK
LOCATION: BACK

## 2024-05-27 ASSESSMENT — PAIN - FUNCTIONAL ASSESSMENT
PAIN_FUNCTIONAL_ASSESSMENT: PREVENTS OR INTERFERES SOME ACTIVE ACTIVITIES AND ADLS
PAIN_FUNCTIONAL_ASSESSMENT: PREVENTS OR INTERFERES WITH MANY ACTIVE NOT PASSIVE ACTIVITIES

## 2024-05-27 NOTE — PROGRESS NOTES
Hospitalist Progress Note      PCP: John Nuno APRN - CNP    Date of Admission: 5/24/2024        Hospital Course:  64 y.o. male presented with LOW BACK PAIN.  HE IS VERY [place] AND A POOR HISTORIAN.  HE STATES FOR THE PAST 3 WEEKS HE HAS HAD SEVERE LBP AND DIFFICULTY WALKING.  HIS   GIRLFRIEND WAS RUBBING HIS LOWER BACK WITH A PAIN CREAM AND IT DID NOT WORK. HE DENIES FALLING       5/26 FOR [facility]    Subjective:     Resting comfortably in bed  Agitated this morning stating that he got bad news about his mom  Minimal conversation and asked to speak at another time         Medications:  Reviewed    Infusion Medications    sodium chloride 100 mL/hr at 05/25/24 8224     Scheduled Medications    folic acid  1 mg Oral Daily    cyanocobalamin  1,000 mcg IntraMUSCular Daily    enoxaparin  40 mg SubCUTAneous Daily     PRN Meds: HYDROmorphone, oxyCODONE-acetaminophen, hydrALAZINE, cyclobenzaprine, sennosides-docusate sodium, ondansetron **OR** ondansetron, hydrOXYzine pamoate      Intake/Output Summary (Last 24 hours) at 5/27/2024 0825  Last data filed at 5/27/2024 0605  Gross per 24 hour   Intake 240 ml   Output 800 ml   Net -560 ml         Exam:    BP (!) 144/78   Pulse 80   Temp 97.6 °F (36.4 °C) (Temporal)   Resp 18   Wt 68 kg (150 lb)   SpO2 95%   BMI 22.15 kg/m²         eneral appearance:  No apparent distress, appears stated age.  HEENT:  Normal cephalic, [place]  Neck: Supple, with full range of motion. No jugular venous distention. Trachea midline.  Respiratory:  Normal respiratory effort. Clear to auscultation,   Cardiovascular:  RRR  Abdomen: Soft, non-tender, non-distended with normal bowel sounds.  Musculoskeletal:  No clubbing, cyanosis or edema bilaterally.    Skin: smooth and dry   Neurologic:   Cranial nerves: II-XII intact,   Psychiatric:  Alert and oriented x 3                     Labs:   Recent Labs     05/24/24  1437 05/25/24  0727 05/26/24  0509 05/27/24  0444   WBC 3.0* 3.1* 3.9* 3.0*

## 2024-05-27 NOTE — PROGRESS NOTES
Palliative Care Department  [phone]  Palliative Care Progress Note  Provider Nisha Mathis, APRN - CNP    Derek Alvarado  [id number]  Hospital Day: 4  Date of Initial Consult: 5/25/2024  Referring Provider: Dave Martinez MD  Palliative Medicine was consulted for assistance with: Overwhelming symptoms    HPI:   Derek Alvarado is a 64 y.o. with a medical history of anxiety who was admitted on 5/24/2024 from home with a CHIEF COMPLAINT of low back pain.  Patient is very hard of hearing and a poor historian.  States for the past 3 weeks he has been having severe lower back pain and difficulty walking.  MRI showing severe subacute compression fracture of T12 with up to 80% height loss centrally.  Severe chronic compression fractures of L1.  Moderate chronic compression fracture of L4.  Mild chronic compression deformities of T11, L2, L3 and L5.  Erosive changes and associated abnormal enhancement, most pronounced in the left sacrum and right iliac wing, suspicious for bony metastasis.  Patient was admitted for further medical management.  ASSESSMENT/PLAN:     Pertinent Hospital Diagnoses     Lytic bone lesions  Hypercalcemia    Palliative Care Encounter / Counseling Regarding Goals of Care  Please see detailed goals of care discussion as below  At this time, Derek Alvarado, Does have capacity for medical decision-making.  Capacity is time limited and situation/question specific  During encounter there was no surrogate medical decision-maker needed  Outcome of goals of care meeting:   Symptom management  Full code  Code status Full Code  Advanced Directives: no POA or living will in [facility]  Surrogate/Legal NOK:  Michael Alvarado (parent) [phone]  Krista Clouder (girlfriend) [phone]      Pain related to neoplasm:   -Continue Dilaudid 0.25 mg IV every 4 hours as needed pain   (Breakthrough pain only; use p.o. first)   -Continue Percocet 5-325 mg every 6 hours as needed pain  Cramps/muscle spasm:   -Continue Flexeril 10 mg  3 times daily as needed     Bowel regimen:   -Senna-S 2 tabs daily     Spiritual assessment: no spiritual distress identified  Bereavement and grief: to be determined  Referrals to: none today  SUBJECTIVE:     Current medical issues leading to Palliative Medicine involvement include   Active Hospital Problems    Diagnosis Date Noted    Macrocytic anemia [D53.9] 05/26/2024    Lytic bone lesions on xray [M89.9] 05/25/2024    Palliative care encounter [Z51.5] 05/25/2024    Goals of care, counseling/discussion [Z71.89] 05/25/2024    Hypercalcemia [E83.52] 05/24/2024    Acute right-sided low back pain with sciatica [M54.40] 05/24/2024    Essential hypertension [I10] 10/22/2020       Details of Conversation:    Chart reviewed.  Update received from nursing.  Patient seen resting in bed, hard of hearing.  Alert and oriented and able to hold meaningful conversation.  Hard to hold conversation as patient is very scattered with his thoughts and can't stay focused on topic of conversation.  Patient states his cramping and lower back pain is slightly improving every day.  Patient just received dose of Percocet.  States pain at this time is 4/10.  Attempted to discuss goals of care and CODE STATUS options however patient was unable to follow conversation.  He does state he would like everything done for him and his goal is to live longer.  Plan for bone biopsy.  Emotional support given and all questions addressed.  Will continue to follow.    OBJECTIVE:   Prognosis: unknown    Physical Exam:  BP (!) 144/78   Pulse 80   Temp 97.6 °F (36.4 °C) (Temporal)   Resp 17   Wt 68 kg (150 lb)   SpO2 95%   BMI 22.15 kg/m²   Constitutional:  Elderly, thin, awake, alert, [place]  Lungs: CTA bilaterally, no audible rhonchi or wheezes noted, respirations unlabored, no retractions  Heart:  RRR, distant heart tones, no murmur, rub, or gallop noted during exam  Abd:  Soft, non tender, non distended, bowel sounds present  Neuro:   Alert, grossly

## 2024-05-27 NOTE — PROGRESS NOTES
[facility]    Inpatient Medical Oncology/Hematology follow-up:      Patient Name: Derek Alvarado  YOB: 1960    DATE OF ADMISSION: 5/24/2024  DATE OF CONSULTATION: 5/25/2024  CONSULTING PROVIDER: Adelaida Talley MD  REASON FOR CONSULTATION: \"Anemia, hypercalcemia, rule out multiple myeloma\"  PCP: John Nuno    Room: [address] [place], [place] [address]      CHIEF COMPLAINT:  Back pain      Subjective:  The patient is complaining of back pain.  No nausea or vomiting.    HPI from Initial Inpatient Consultation  (5/25/2024):   Patient is a 64 y.o. male comes in for back pain. He initially presented to [facility] with these symptoms that started in recent days. Also complains of right lower extremity cramping with some degree of weakness. He was somewhat hard of hearing. Patient c/o right arm tingling and some weakness.  Apart from his lower back he does not have significant pain else where including the neck.    Upon admission, labs found his Hgb was 8.1 with Hgb; calcium was 12.2. Creatinine was 1.6.         ROS: Negative except as noted above.             Past Medical History:   Diagnosis Date    Anxiety        No past surgical history on file.    Social History     Socioeconomic History    Marital status: Single   Tobacco Use    Smoking status: Never    Smokeless tobacco: Never   Vaping Use    Vaping Use: Never used   Substance and Sexual Activity    Alcohol use: Never    Drug use: Never     Social Determinants of Health     Financial Resource Strain: Low Risk  (1/26/2022)    Overall Financial Resource Strain (CARDIA)     Difficulty of Paying Living Expenses: Not hard at all   Food Insecurity: No Food Insecurity (1/26/2022)    Hunger Vital Sign     Worried About Running Out of Food in the Last Year: Never true     Ran Out of Food in the Last Year: Never true   Physical Activity: Inactive (1/26/2022)    Exercise Vital Sign     Days of Exercise per Week: 0 days     Minutes of Exercise per  CREATININE 1.7 (H) 05/27/2024    BUN 25 (H) 05/27/2024     05/27/2024    K 3.2 (L) 05/27/2024     05/27/2024    CO2 18 (L) 05/27/2024       Pathology:     Radiology:        ASSESSMENT     Clinical/Laboratory features worrisome for multiple myeloma  Spinal compression fracture(s)  Right iliac bone and left sacrum bone lesions  Anemia, hypercalcemia, RAMONA likely associated with above  B12 deficiency  Folate deficiency    The patient is a 64 y.o. male who comes in for back pain with imaging evidence of compression fractures. Labs showed RAMONA, hypercalcemia, and significant anemia. We have been consulted by nephrology team to evaluate for possible multiple myeloma.     MRI L spine reviewed; no evidence of spinal cord compression.  IgG significantly elevated at ~4,000  Haptoglobin and reticulocytes; low suspicion for hemolysis.   Iron studies largely unremarkable, not highly suggestive of iron deficiency.       PLAN     Bone marrow biopsy and aspirate ordered due to high suspicion of multiple myeloma  Neurosurgery following; recommending brace; no surgery planned  Radiation oncology consult  SPEP and UPEP ordered by ED; will follow up on results  Awaiting serum and urine NHI, serum free light chains, 24-hour urine protein, [id number]  IgG elevated 4001, IgA less than 50, IgM less than 25.  Ordered peripheral smear and flow cytometry due to detection of blasts on CBC  Awaiting skeletal survey  Nephrology following  On IV fluids for now for hypercalcemia, labs reviewed, creatinine 1.7, calcium 9.2.  Palliative care following  Ordered Flexeril    Vitamin B12 and folate deficiency, B12 injections and Folic acid tablets ordered  Hemoglobin 7G/DL today, continue to monitor, if decreases packed RBCs transfusion to be ordered.  Consider preemptively starting pulse steroids/Decadron after bone marrow biopsy (of note, did receive a dose of Solumedrol 40 mg on 5/25/24)         Ericka Goldberg MD  MEDICAL  ONCOLOGY  [facility]  5/27/2024    [facility] ([place] Office  P: [phone]  F: [phone]    [facility] ([place] Office  P: [phone]  F: [phone]    [facility] ([place]) Office  P: [phone]  F: [phone]

## 2024-05-27 NOTE — PROGRESS NOTES
Spoke with X-ray, said they will send for him, pt is aware of how test will go. Pt said he will try and do x-ray.

## 2024-05-27 NOTE — PROGRESS NOTES
Department of Internal Medicine  Nephrology Attending Progress Note    Events reviewed     SUBJECTIVE: we are following Mr Jenny for hypercalcemia and elevated creatinine. Complaining of back pain.     PHYSICAL EXAM:      Vitals:    VITALS:  BP (!) 144/78   Pulse 80   Temp 97.6 °F (36.4 °C) (Temporal)   Resp 18   Wt 68 kg (150 lb)   SpO2 95%   BMI 22.15 kg/m²   24HR INTAKE/OUTPUT:    Intake/Output Summary (Last 24 hours) at 5/27/2024 0942  Last data filed at 5/27/2024 0605  Gross per 24 hour   Intake 240 ml   Output 800 ml   Net -560 ml       Scheduled   Constitutional: Patient is awake, alert, in no distress  HEENT: Pupils equal reactive  Respiratory: Lungs are clear  Cardiovascular/Edema: Heart sounds are regular  Gastrointestinal: Abdomen soft  Neurologic: No focal  Skin: No lesions  Other: No edema    Scheduled Meds:   calcitonin  250 Units SubCUTAneous Q12H    folic acid  1 mg Oral Daily    cyanocobalamin  1,000 mcg IntraMUSCular Daily    [Held by provider] enoxaparin  40 mg SubCUTAneous Daily     Continuous Infusions:   sodium chloride 100 mL/hr at 05/25/24 2255     PRN Meds:.HYDROmorphone, oxyCODONE-acetaminophen, hydrALAZINE, cyclobenzaprine, sennosides-docusate sodium, ondansetron **OR** ondansetron, hydrOXYzine pamoate      DATA:    CBC:   Lab Results   Component Value Date/Time    WBC 3.0 05/27/2024 04:44 AM    RBC 2.10 05/27/2024 04:44 AM    HGB 7.0 05/27/2024 04:44 AM    HCT 21.4 05/27/2024 04:44 AM    .9 05/27/2024 04:44 AM    MCH 33.3 05/27/2024 04:44 AM    MCHC 32.7 05/27/2024 04:44 AM    RDW 14.0 05/27/2024 04:44 AM     05/27/2024 04:44 AM    MPV 9.2 05/27/2024 04:44 AM     CMP:    Lab Results   Component Value Date/Time     05/27/2024 04:44 AM    K 3.2 05/27/2024 04:44 AM     05/27/2024 04:44 AM    CO2 18 05/27/2024 04:44 AM    BUN 25 05/27/2024 04:44 AM    CREATININE 1.7 05/27/2024 04:44 AM    GFRAA >60 07/27/2021 07:55 AM    LABGLOM 45 05/27/2024 04:44 AM     GLUCOSE 85 05/27/2024 04:44 AM    CALCIUM 11.2 05/27/2024 04:44 AM    BILITOT 1.1 05/27/2024 04:44 AM    ALKPHOS 50 05/27/2024 04:44 AM    AST 13 05/27/2024 04:44 AM    ALT 5 05/27/2024 04:44 AM     Magnesium:    Lab Results   Component Value Date/Time    MG 1.5 05/27/2024 04:44 AM     Phosphorus:    Lab Results   Component Value Date/Time    PHOS 3.1 05/25/2024 07:27 AM       Radiology Review:        BRIEF SUMMARY OF INITIAL CONSULT:    Briefly Mr. Alvarado is a 64-year-old male with history of HTN, nephrolithiasis, hyperlipidemia, hard of hearing, who was admitted on May 24, 2024 transferred from [facility], where he initially presented with altered mental status back pain and was found to have hypercalcemia with calcium >14, he has well had a CT scan of the spine which showed multiple lytic lesions with compression fracture reason for his transferring.  On admission he was found to have a sodium level of 131, creatinine 1.6 mg/dL and calcium level 12.2 mg/dL, reasons for this consultation.     Problems resolved:  Hyponatremia, probably hemodynamically mediated versus pseudohyponatremia, resolved with IVF administration. Home lexapro on hold. Sodium 129 today.     IMPRESSION/RECOMMENDATIONS:      RAMONA on CKD versus CKD, probably volume responsive prerenal RAMONA rule out multiple myeloma cast nephropathy, renal function has not improved despite IV fluids administration, creatinine stable 1.7 mg/dL, continue IV fluids, obtain urine PCR UACR     Hypercalcemia, non-PTH dependent (PTH level 8.9) likely 2/2 multiple myeloma, ionized calcium 1.49  Hypokalemia 2/2 poor oral intake, to replace  Hypomagnesemia 2/2 poor intake, to replace  Low bicarbonate levels likely 2/2 NS administration, obtain venous pH  Macrocytic anemia, rule out multiple myeloma, TIBC 193, iron 72, ferritin 792, folate 4.7, vitamin b12 204. B12 injections and folic acid tablets   B12 deficiency, B12 level 204, to replace     Plan:     Increase

## 2024-05-27 NOTE — PLAN OF CARE
Problem: Skin/Tissue Integrity  Goal: Absence of new skin breakdown  Description: 1.  Monitor for areas of redness and/or skin breakdown  2.  Assess vascular access sites hourly  3.  Every 4-6 hours minimum:  Change oxygen saturation probe site  4.  Every 4-6 hours:  If on nasal continuous positive airway pressure, respiratory therapy assess nares and determine need for appliance change or resting period.  Outcome: Not Progressing  Note: Patient refusing to be turned or get out of bed    Problem: Discharge Planning  Goal: Discharge to home or other facility with appropriate resources  Outcome: Progressing  Flowsheets (Taken 5/27/2024 0608)  Discharge to home or other facility with appropriate resources:   Identify barriers to discharge with patient and caregiver   Identify discharge learning needs (meds, wound care, etc)   Refer to discharge planning if patient needs post-hospital services based on physician order or complex needs related to functional status, cognitive ability or social support system   Arrange for needed discharge resources and transportation as appropriate     Problem: Pain  Goal: Verbalizes/displays adequate comfort level or baseline comfort level  Outcome: Progressing  Flowsheets (Taken 5/27/2024 0608)  Verbalizes/displays adequate comfort level or baseline comfort level:   Encourage patient to monitor pain and request assistance   Administer analgesics based on type and severity of pain and evaluate response   Implement non-pharmacological measures as appropriate and evaluate response   Notify Licensed Independent Practitioner if interventions unsuccessful or patient reports new pain     Problem: Safety - Adult  Goal: Free from fall injury  Outcome: Progressing  Flowsheets (Taken 5/27/2024 0608)  Free From Fall Injury: Instruct family/caregiver on patient safety     Problem: ABCDS Injury Assessment  Goal: Absence of physical injury  Outcome: Progressing  Flowsheets (Taken 5/27/2024

## 2024-05-27 NOTE — PROGRESS NOTES
Isidra served Corie Saab NP in regards to pt being anxious about getting bone survey and needing something for anxious. New orders for x1 dose 0.5mg Ativan IV as long as BP ok. /95, pulse 99.

## 2024-05-28 LAB
ALBUMIN SERPL-MCNC: 3.1 G/DL (ref 3.5–5.2)
ALBUMIN SERPL-MCNC: 3.2 G/DL (ref 3.5–4.7)
ALP SERPL-CCNC: 52 U/L (ref 40–129)
ALPHA1 GLOB SERPL ELPH-MCNC: 0.4 G/DL (ref 0.2–0.4)
ALPHA2 GLOB SERPL ELPH-MCNC: 1.3 G/DL (ref 0.5–1)
ALT SERPL-CCNC: 5 U/L (ref 0–40)
ANION GAP SERPL CALCULATED.3IONS-SCNC: 13 MMOL/L (ref 7–16)
AST SERPL-CCNC: 13 U/L (ref 0–39)
B-GLOBULIN SERPL ELPH-MCNC: 0.4 G/DL (ref 0.8–1.3)
BASOPHILS # BLD: 0.03 K/UL (ref 0–0.2)
BASOPHILS NFR BLD: 1 % (ref 0–2)
BILIRUB SERPL-MCNC: 0.8 MG/DL (ref 0–1.2)
BUN SERPL-MCNC: 19 MG/DL (ref 6–23)
CA-I BLD-SCNC: 1.25 MMOL/L (ref 1.15–1.33)
CALCIUM SERPL-MCNC: 9.3 MG/DL (ref 8.6–10.2)
CHLORIDE SERPL-SCNC: 106 MMOL/L (ref 98–107)
CO2 SERPL-SCNC: 18 MMOL/L (ref 22–29)
CREAT SERPL-MCNC: 1.5 MG/DL (ref 0.7–1.2)
EOSINOPHIL # BLD: 0 K/UL (ref 0.05–0.5)
EOSINOPHILS RELATIVE PERCENT: 0 % (ref 0–6)
ERYTHROCYTE [DISTWIDTH] IN BLOOD BY AUTOMATED COUNT: 14.6 % (ref 11.5–15)
GAMMA GLOB SERPL ELPH-MCNC: 3.4 G/DL (ref 0.7–1.6)
GFR, ESTIMATED: 54 ML/MIN/1.73M2
GLUCOSE SERPL-MCNC: 95 MG/DL (ref 74–99)
HCT VFR BLD AUTO: 21.8 % (ref 37–54)
HGB BLD-MCNC: 7 G/DL (ref 12.5–16.5)
INTERPRETATION SERPL IFE-IMP: NORMAL
LYMPHOCYTES NFR BLD: 0.6 K/UL (ref 1.5–4)
LYMPHOCYTES RELATIVE PERCENT: 19 % (ref 20–42)
M PROTEIN SERPL ELPH-MCNC: 3 G/DL
MAGNESIUM SERPL-MCNC: 1.6 MG/DL (ref 1.6–2.6)
MCH RBC QN AUTO: 33.7 PG (ref 26–35)
MCHC RBC AUTO-ENTMCNC: 32.1 G/DL (ref 32–34.5)
MCV RBC AUTO: 104.8 FL (ref 80–99.9)
MONOCYTES NFR BLD: 0.24 K/UL (ref 0.1–0.95)
MONOCYTES NFR BLD: 8 % (ref 2–12)
NEUTROPHILS NFR BLD: 72 % (ref 43–80)
NEUTS SEG NFR BLD: 2.23 K/UL (ref 1.8–7.3)
P E INTERPRETATION, U: NORMAL
PATH REV BLD -IMP: NORMAL
PATH REV: NORMAL
PATHOLOGIST: ABNORMAL
PATHOLOGIST: NORMAL
PLATELET # BLD AUTO: 159 K/UL (ref 130–450)
PMV BLD AUTO: 9.3 FL (ref 7–12)
POTASSIUM SERPL-SCNC: 3.5 MMOL/L (ref 3.5–5)
PROT PATTERN SERPL ELPH-IMP: ABNORMAL
PROT SERPL-MCNC: 8 G/DL (ref 6.4–8.3)
PROT SERPL-MCNC: 8.7 G/DL (ref 6.4–8.3)
RBC # BLD AUTO: 2.08 M/UL (ref 3.8–5.8)
RBC # BLD: ABNORMAL 10*6/UL
SODIUM SERPL-SCNC: 137 MMOL/L (ref 132–146)
SPECIMEN TYPE: NORMAL
SPECIMEN TYPE: NORMAL
URINE IFX INTERP: NORMAL
WBC OTHER # BLD: 3.1 K/UL (ref 4.5–11.5)

## 2024-05-28 PROCEDURE — 6360000002 HC RX W HCPCS

## 2024-05-28 PROCEDURE — 6360000002 HC RX W HCPCS: Performed by: STUDENT IN AN ORGANIZED HEALTH CARE EDUCATION/TRAINING PROGRAM

## 2024-05-28 PROCEDURE — 99231 SBSQ HOSP IP/OBS SF/LOW 25: CPT | Performed by: NURSE PRACTITIONER

## 2024-05-28 PROCEDURE — 6370000000 HC RX 637 (ALT 250 FOR IP): Performed by: INTERNAL MEDICINE

## 2024-05-28 PROCEDURE — 82330 ASSAY OF CALCIUM: CPT

## 2024-05-28 PROCEDURE — 83735 ASSAY OF MAGNESIUM: CPT

## 2024-05-28 PROCEDURE — 99232 SBSQ HOSP IP/OBS MODERATE 35: CPT | Performed by: INTERNAL MEDICINE

## 2024-05-28 PROCEDURE — 6370000000 HC RX 637 (ALT 250 FOR IP): Performed by: NURSE PRACTITIONER

## 2024-05-28 PROCEDURE — 2580000003 HC RX 258: Performed by: INTERNAL MEDICINE

## 2024-05-28 PROCEDURE — 36415 COLL VENOUS BLD VENIPUNCTURE: CPT

## 2024-05-28 PROCEDURE — 6360000002 HC RX W HCPCS: Performed by: INTERNAL MEDICINE

## 2024-05-28 PROCEDURE — L0464 TLSO 4MOD SACRO-SCAP PRE: HCPCS

## 2024-05-28 PROCEDURE — 1200000000 HC SEMI PRIVATE

## 2024-05-28 PROCEDURE — 85025 COMPLETE CBC W/AUTO DIFF WBC: CPT

## 2024-05-28 PROCEDURE — 6370000000 HC RX 637 (ALT 250 FOR IP): Performed by: STUDENT IN AN ORGANIZED HEALTH CARE EDUCATION/TRAINING PROGRAM

## 2024-05-28 PROCEDURE — 80053 COMPREHEN METABOLIC PANEL: CPT

## 2024-05-28 RX ORDER — POTASSIUM CHLORIDE 20 MEQ/1
40 TABLET, EXTENDED RELEASE ORAL ONCE
Status: DISCONTINUED | OUTPATIENT
Start: 2024-05-28 | End: 2024-05-28 | Stop reason: SDUPTHER

## 2024-05-28 RX ORDER — SODIUM CHLORIDE, SODIUM LACTATE, POTASSIUM CHLORIDE, CALCIUM CHLORIDE 600; 310; 30; 20 MG/100ML; MG/100ML; MG/100ML; MG/100ML
INJECTION, SOLUTION INTRAVENOUS CONTINUOUS
Status: DISCONTINUED | OUTPATIENT
Start: 2024-05-28 | End: 2024-05-31

## 2024-05-28 RX ORDER — MAGNESIUM SULFATE IN WATER 40 MG/ML
2000 INJECTION, SOLUTION INTRAVENOUS ONCE
Status: COMPLETED | OUTPATIENT
Start: 2024-05-28 | End: 2024-05-28

## 2024-05-28 RX ORDER — OXYCODONE HYDROCHLORIDE 5 MG/1
5 TABLET ORAL EVERY 6 HOURS PRN
Status: DISCONTINUED | OUTPATIENT
Start: 2024-05-28 | End: 2024-06-01 | Stop reason: HOSPADM

## 2024-05-28 RX ORDER — LANOLIN ALCOHOL/MO/W.PET/CERES
400 CREAM (GRAM) TOPICAL DAILY
Status: DISCONTINUED | OUTPATIENT
Start: 2024-05-28 | End: 2024-05-30

## 2024-05-28 RX ADMIN — POTASSIUM BICARBONATE 40 MEQ: 782 TABLET, EFFERVESCENT ORAL at 12:48

## 2024-05-28 RX ADMIN — CALCITONIN SALMON 250 UNITS: 200 INJECTION, SOLUTION INTRAMUSCULAR; SUBCUTANEOUS at 02:05

## 2024-05-28 RX ADMIN — HYDROMORPHONE HYDROCHLORIDE 0.25 MG: 1 INJECTION, SOLUTION INTRAMUSCULAR; INTRAVENOUS; SUBCUTANEOUS at 08:23

## 2024-05-28 RX ADMIN — FOLIC ACID 1 MG: 1 TABLET ORAL at 08:21

## 2024-05-28 RX ADMIN — OXYCODONE 5 MG: 5 TABLET ORAL at 19:55

## 2024-05-28 RX ADMIN — MAGNESIUM SULFATE HEPTAHYDRATE 2000 MG: 40 INJECTION, SOLUTION INTRAVENOUS at 12:36

## 2024-05-28 RX ADMIN — HYDROMORPHONE HYDROCHLORIDE 0.25 MG: 1 INJECTION, SOLUTION INTRAMUSCULAR; INTRAVENOUS; SUBCUTANEOUS at 21:52

## 2024-05-28 RX ADMIN — CYANOCOBALAMIN 1000 MCG: 1000 INJECTION, SOLUTION INTRAMUSCULAR; SUBCUTANEOUS at 08:22

## 2024-05-28 RX ADMIN — SODIUM CHLORIDE: 9 INJECTION, SOLUTION INTRAVENOUS at 01:27

## 2024-05-28 RX ADMIN — CYCLOBENZAPRINE 10 MG: 10 TABLET, FILM COATED ORAL at 21:50

## 2024-05-28 RX ADMIN — HYDROMORPHONE HYDROCHLORIDE 0.25 MG: 1 INJECTION, SOLUTION INTRAMUSCULAR; INTRAVENOUS; SUBCUTANEOUS at 14:17

## 2024-05-28 RX ADMIN — OXYCODONE HYDROCHLORIDE AND ACETAMINOPHEN 1 TABLET: 5; 325 TABLET ORAL at 06:35

## 2024-05-28 RX ADMIN — SODIUM CHLORIDE, POTASSIUM CHLORIDE, SODIUM LACTATE AND CALCIUM CHLORIDE: 600; 310; 30; 20 INJECTION, SOLUTION INTRAVENOUS at 12:26

## 2024-05-28 RX ADMIN — OXYCODONE 5 MG: 5 TABLET ORAL at 17:02

## 2024-05-28 ASSESSMENT — PAIN SCALES - GENERAL
PAINLEVEL_OUTOF10: 8
PAINLEVEL_OUTOF10: 5
PAINLEVEL_OUTOF10: 7
PAINLEVEL_OUTOF10: 5
PAINLEVEL_OUTOF10: 7
PAINLEVEL_OUTOF10: 7
PAINLEVEL_OUTOF10: 5
PAINLEVEL_OUTOF10: 9
PAINLEVEL_OUTOF10: 7

## 2024-05-28 ASSESSMENT — PAIN DESCRIPTION - ORIENTATION
ORIENTATION: LOWER
ORIENTATION: MID;LOWER
ORIENTATION: MID;LOWER

## 2024-05-28 ASSESSMENT — PAIN DESCRIPTION - LOCATION
LOCATION: BACK

## 2024-05-28 ASSESSMENT — PAIN SCALES - WONG BAKER
WONGBAKER_NUMERICALRESPONSE: NO HURT
WONGBAKER_NUMERICALRESPONSE: NO HURT

## 2024-05-28 ASSESSMENT — PAIN DESCRIPTION - DESCRIPTORS
DESCRIPTORS: ACHING;THROBBING;TIGHTNESS
DESCRIPTORS: ACHING;DISCOMFORT;SORE
DESCRIPTORS: ACHING;SQUEEZING;THROBBING
DESCRIPTORS: ACHING;DISCOMFORT;SORE
DESCRIPTORS: ACHING;DISCOMFORT;SORE
DESCRIPTORS: TIGHTNESS;THROBBING

## 2024-05-28 ASSESSMENT — PAIN - FUNCTIONAL ASSESSMENT: PAIN_FUNCTIONAL_ASSESSMENT: PREVENTS OR INTERFERES SOME ACTIVE ACTIVITIES AND ADLS

## 2024-05-28 ASSESSMENT — PAIN DESCRIPTION - FREQUENCY: FREQUENCY: INTERMITTENT

## 2024-05-28 ASSESSMENT — PAIN DESCRIPTION - ONSET: ONSET: ON-GOING

## 2024-05-28 ASSESSMENT — PAIN DESCRIPTION - PAIN TYPE: TYPE: CHRONIC PAIN

## 2024-05-28 NOTE — PROGRESS NOTES
Department of Internal Medicine  Nephrology Attending Progress Note    Events reviewed     SUBJECTIVE: we are following Mr Jenny for hypercalcemia and elevated creatinine. Complaining of back pain.     PHYSICAL EXAM:      Vitals:    VITALS:  /79   Pulse 84   Temp 99.2 °F (37.3 °C) (Temporal)   Resp 17   Wt 68 kg (150 lb)   SpO2 96%   BMI 22.15 kg/m²   24HR INTAKE/OUTPUT:    Intake/Output Summary (Last 24 hours) at 5/28/2024 1141  Last data filed at 5/28/2024 0637  Gross per 24 hour   Intake 0 ml   Output 1000 ml   Net -1000 ml         Constitutional: Patient is awake, alert, in no distress  HEENT: Pupils equal reactive  Respiratory: Lungs are clear  Cardiovascular/Edema: Heart sounds are regular  Gastrointestinal: Abdomen soft  Neurologic: No focal  Skin: No lesions  Other: No edema    Scheduled Meds:   pamidronate (AREDIA) 30 mg in sodium chloride 0.9 % 500 mL infusion  30 mg IntraVENous Once    folic acid  1 mg Oral Daily    cyanocobalamin  1,000 mcg IntraMUSCular Daily    [Held by provider] enoxaparin  40 mg SubCUTAneous Daily     Continuous Infusions:   sodium chloride 125 mL/hr at 05/28/24 0127     PRN Meds:.HYDROmorphone, oxyCODONE-acetaminophen, hydrALAZINE, cyclobenzaprine, sennosides-docusate sodium, ondansetron **OR** ondansetron, hydrOXYzine pamoate      DATA:    CBC:   Lab Results   Component Value Date/Time    WBC 3.0 05/27/2024 04:44 AM    RBC 2.10 05/27/2024 04:44 AM    HGB 7.0 05/27/2024 04:44 AM    HCT 21.4 05/27/2024 04:44 AM    .9 05/27/2024 04:44 AM    MCH 33.3 05/27/2024 04:44 AM    MCHC 32.7 05/27/2024 04:44 AM    RDW 14.0 05/27/2024 04:44 AM     05/27/2024 04:44 AM    MPV 9.2 05/27/2024 04:44 AM     CMP:    Lab Results   Component Value Date/Time     05/28/2024 10:08 AM    K 3.5 05/28/2024 10:08 AM     05/28/2024 10:08 AM    CO2 18 05/28/2024 10:08 AM    BUN 19 05/28/2024 10:08 AM    CREATININE 1.5 05/28/2024 10:08 AM    GFRAA >60 07/27/2021 07:55 AM

## 2024-05-28 NOTE — PROGRESS NOTES
ATTENDING ATTESTATION:  I reviewed chart including laboratory/imaging studies, past medical Hx, family hx, social hx and surgical hx.  I have seen and examined patient today on 5/28/2024 Apart from any edits made, I agree with history/exam and assessment and plan noted by LUIS ARMANDO Car.    [facility]    Inpatient Medical Oncology/Hematology follow-up:      Patient Name: Derek Alvarado  YOB: 1960    DATE OF ADMISSION: 5/24/2024  DATE OF CONSULTATION: 5/25/2024  CONSULTING PROVIDER: Adelaida Talley MD  REASON FOR CONSULTATION: \"Anemia, hypercalcemia, rule out multiple myeloma\"  PCP: John Nuno    [facility]      CHIEF COMPLAINT:  Back pain      Subjective:  The patient had breakfast, was not able to have the bone marrow biopsy and aspirate done today, will have it done tomorrow.  He has back pain.  He has itching of the back.    HPI from Initial Inpatient Consultation  (5/25/2024):   Patient is a 64 y.o. male comes in for back pain. He initially presented to [facility] with these symptoms that started in recent days. Also complains of right lower extremity cramping with some degree of weakness. He was somewhat hard of hearing. Patient c/o right arm tingling and some weakness.  Apart from his lower back he does not have significant pain else where including the neck.    Upon admission, labs found his Hgb was 8.1 with Hgb; calcium was 12.2. Creatinine was 1.6.         ROS: Negative except as noted above.             Past Medical History:   Diagnosis Date    Anxiety        No past surgical history on file.    Social History     Socioeconomic History    Marital status: Single   Tobacco Use    Smoking status: Never    Smokeless tobacco: Never   Vaping Use    Vaping Use: Never used   Substance and Sexual Activity    Alcohol use: Never    Drug use: Never     Social Determinants of Health     Financial Resource Strain: Low Risk  (1/26/2022)    Overall  (Last 24 hours) at 5/28/2024 2158  Last data filed at 5/28/2024 0637  Gross per 24 hour   Intake --   Output 1000 ml   Net -1000 ml             DIAGNOSTIC DATA:   Lab Results   Component Value Date    WBC 3.1 (L) 05/28/2024    HGB 7.0 (L) 05/28/2024    HCT 21.8 (L) 05/28/2024    .8 (H) 05/28/2024     05/28/2024     Lab Results   Component Value Date    NEUTROABS 2.23 05/28/2024     Lab Results   Component Value Date    ALT 5 05/28/2024    AST 13 05/28/2024    ALKPHOS 52 05/28/2024    BILITOT 0.8 05/28/2024     Lab Results   Component Value Date    CREATININE 1.5 (H) 05/28/2024    BUN 19 05/28/2024     05/28/2024    K 3.5 05/28/2024     05/28/2024    CO2 18 (L) 05/28/2024       Pathology:     Radiology:        ASSESSMENT     Clinical/Laboratory features worrisome for multiple myeloma  Spinal compression fracture(s)  Right iliac bone and left sacrum bone lesions  Anemia, hypercalcemia, RAMONA likely associated with above  B12 deficiency  Folate deficiency    The patient is a 64 y.o. male who comes in for back pain with imaging evidence of compression fractures. Labs showed RAMONA, hypercalcemia, and significant anemia. We have been consulted by nephrology team to evaluate for possible multiple myeloma.     MRI L spine reviewed; no evidence of spinal cord compression.  IgG significantly elevated at ~4,000  Haptoglobin and reticulocytes; low suspicion for hemolysis.   Iron studies largely unremarkable, not highly suggestive of iron deficiency.       PLAN     Bone marrow biopsy and aspirate could not be done today as the patient had breakfast, plan for bone marrow biopsy and aspirate tomorrow, discussed with RN.  Neurosurgery following; recommending brace; no surgery planned  Radiation oncology consult  The patient has IgG lambda monoclonal protein, M spike 3G/DL.  IgG elevated 4001, IgA less than 50, IgM less than 25.  Ordered peripheral smear and flow cytometry due to detection of blasts on

## 2024-05-28 NOTE — PLAN OF CARE
Problem: Discharge Planning  Goal: Discharge to home or other facility with appropriate resources  5/28/2024 1041 by Dary Navarro RN  Outcome: Progressing  5/28/2024 0253 by Bennie Decker RN  Outcome: Progressing  Flowsheets (Taken 5/27/2024 1930)  Discharge to home or other facility with appropriate resources: Identify barriers to discharge with patient and caregiver     Problem: Pain  Goal: Verbalizes/displays adequate comfort level or baseline comfort level  5/28/2024 1041 by Dary Navarro RN  Outcome: Progressing  5/28/2024 0253 by Bennie Decker RN  Outcome: Progressing  Flowsheets (Taken 5/27/2024 1930)  Verbalizes/displays adequate comfort level or baseline comfort level: Encourage patient to monitor pain and request assistance     Problem: Safety - Adult  Goal: Free from fall injury  5/28/2024 1041 by Dary Navarro RN  Outcome: Progressing  5/28/2024 0253 by Bennie Decker RN  Outcome: Progressing  Flowsheets (Taken 5/27/2024 1930)  Free From Fall Injury: Instruct family/caregiver on patient safety     Problem: Skin/Tissue Integrity  Goal: Absence of new skin breakdown  Description: 1.  Monitor for areas of redness and/or skin breakdown  2.  Assess vascular access sites hourly  3.  Every 4-6 hours minimum:  Change oxygen saturation probe site  4.  Every 4-6 hours:  If on nasal continuous positive airway pressure, respiratory therapy assess nares and determine need for appliance change or resting period.  5/28/2024 1041 by Dary Navarro RN  Outcome: Progressing  5/28/2024 0253 by Bennie Decker RN  Outcome: Progressing     Problem: ABCDS Injury Assessment  Goal: Absence of physical injury  5/28/2024 1041 by Dary Navarro RN  Outcome: Progressing  5/28/2024 0253 by Bennie Decker RN  Outcome: Progressing  Flowsheets (Taken 5/27/2024 1930)  Absence of Physical Injury: Implement safety measures based on patient assessment

## 2024-05-28 NOTE — PROCEDURES
0800 Spoke to pts RN regarding ordered bone marrow biopsy.  Verified that pt can sign own consent.  RN stated that pt ate breakfast today, needs new PT INR. Notified RN that  Pt to be done tomorrow am, make pt npo after midnight, obtain new PT INR, hold all thinners

## 2024-05-28 NOTE — PLAN OF CARE
Problem: Discharge Planning  Goal: Discharge to home or other facility with appropriate resources  Outcome: Progressing  Flowsheets (Taken 5/27/2024 1930)  Discharge to home or other facility with appropriate resources: Identify barriers to discharge with patient and caregiver     Problem: Pain  Goal: Verbalizes/displays adequate comfort level or baseline comfort level  Outcome: Progressing  Flowsheets (Taken 5/27/2024 1930)  Verbalizes/displays adequate comfort level or baseline comfort level: Encourage patient to monitor pain and request assistance     Problem: Safety - Adult  Goal: Free from fall injury  Outcome: Progressing  Flowsheets (Taken 5/27/2024 1930)  Free From Fall Injury: Instruct family/caregiver on patient safety     Problem: Skin/Tissue Integrity  Goal: Absence of new skin breakdown  Description: 1.  Monitor for areas of redness and/or skin breakdown  2.  Assess vascular access sites hourly  3.  Every 4-6 hours minimum:  Change oxygen saturation probe site  4.  Every 4-6 hours:  If on nasal continuous positive airway pressure, respiratory therapy assess nares and determine need for appliance change or resting period.  Outcome: Progressing     Problem: ABCDS Injury Assessment  Goal: Absence of physical injury  Outcome: Progressing  Flowsheets (Taken 5/27/2024 1930)  Absence of Physical Injury: Implement safety measures based on patient assessment

## 2024-05-28 NOTE — ED PROVIDER NOTES
[facility]  EMERGENCY DEPARTMENT ENCOUNTER      Pt Name: Derek Alvarado  MRN: [id number]  Birthdate 1960  Date of evaluation: 5/24/2024  Provider: Yohan Leblanc DO  PCP: John Nuno APRN - [facility]      CHIEF COMPLAINT       Chief Complaint   Patient presents with    Back Pain     Bed bound and back pain got xrays shows compression fracture with mets to spine. From [place], lower back pain now 2/10 R leg cramp. [place], also had low potassium got IV K at [facility]. Had low MG didn't get low MG, got toradol and zofran.       HISTORY OF PRESENT ILLNESS: 1 or more Elements   History From: Patient   Limitations to history : None    Derek Alvarado is a 64 y.o. male past medical history of hypertension.  Patient presents as a transfer from outside hospital due to concern for possible spinal cord compression as well as elevated calcium level.  According to reports patient has been having gradually worsening generalized weakness and fatigue as well as worsening low back pain for the last few weeks.  Symptoms have worsened to the point where patient has been largely bedbound.  Patient initially presented to outside hospital found to have elevated calcium of greater than 14 as well as abnormal CT scan lumbar spine concerning for possible metastatic disease as well as spinal cord compression.  Patient is also found to have some weakness in his bilateral lower extremities.  Findings were discussed with neurosurgery our facility patient was transferred ER to ER for further evaluation and treatment.  Patient denies any fevers, chills, chest pain, cough, constipation or diarrhea.    Nursing Notes were all reviewed and agreed with or any disagreements were addressed in the HPI.    REVIEW OF SYSTEMS :    Positives and Pertinent negatives as per HPI.     PAST MEDICAL HISTORY/Chronic Conditions Affecting Care    has a past medical history of Anxiety.     SURGICAL HISTORY   No past surgical history on file.    CURRENTMEDICATIONS   patient occluding admission all questions were answered patient agreement plan of care admitted to the hospital stable condition.    FINAL IMPRESSION      1. Hypercalcemia    2. Abnormal computed tomography of lumbar spine    3. Intractable back pain          DISPOSITION/PLAN     DISPOSITION Admitted 05/24/2024 04:08:57 PM    PATIENT REFERRED TO:  No follow-up provider specified.    DISCHARGE MEDICATIONS:  Current Discharge Medication List          DISCONTINUED MEDICATIONS:  Current Discharge Medication List               (Please note that portions of this note were completed with a voice recognition program.  Efforts were made to edit the dictations but occasionally words are mis-transcribed.)    Yohan Leblanc DO (electronically signed)       Yohan Leblanc,   05/27/24 5888

## 2024-05-28 NOTE — PROGRESS NOTES
Hospitalist Progress Note      PCP: John Nuno APRN - CNP    Date of Admission: 5/24/2024        Hospital Course:  64 y.o. male presented with LOW BACK PAIN.  HE IS VERY [place] AND A POOR HISTORIAN.  HE STATES FOR THE PAST 3 WEEKS HE HAS HAD SEVERE LBP AND DIFFICULTY WALKING.  HIS   GIRLFRIEND WAS RUBBING HIS LOWER BACK WITH A PAIN CREAM AND IT DID NOT WORK. HE DENIES FALLING       5/26 FOR TSLO BRACE    Subjective:     Resting in bed  Fixated on not eating much and asking if he will end up like \" the babies in [place] malnourished\"   Denies any complaints of shortness of breath and states his pain is well-managed today        Medications:  Reviewed    Infusion Medications    lactated ringers IV soln 75 mL/hr at 05/28/24 1226     Scheduled Medications    magnesium oxide  400 mg Oral Daily    magnesium sulfate  2,000 mg IntraVENous Once    potassium bicarb-citric acid  40 mEq Oral Once    pamidronate (AREDIA) 30 mg in sodium chloride 0.9 % 500 mL infusion  30 mg IntraVENous Once    folic acid  1 mg Oral Daily    cyanocobalamin  1,000 mcg IntraMUSCular Daily    [Held by provider] enoxaparin  40 mg SubCUTAneous Daily     PRN Meds: HYDROmorphone, oxyCODONE-acetaminophen, hydrALAZINE, cyclobenzaprine, sennosides-docusate sodium, ondansetron **OR** ondansetron, hydrOXYzine pamoate      Intake/Output Summary (Last 24 hours) at 5/28/2024 1245  Last data filed at 5/28/2024 0637  Gross per 24 hour   Intake 0 ml   Output 1000 ml   Net -1000 ml         Exam:    /79   Pulse 84   Temp 99.2 °F (37.3 °C) (Temporal)   Resp 17   Wt 68 kg (150 lb)   SpO2 96%   BMI 22.15 kg/m²         eneral appearance:  No apparent distress, appears stated age.  HEENT:  Normal cephalic, [place]  Neck: Supple, with full range of motion. No jugular venous distention. Trachea midline.  Respiratory:  Normal respiratory effort. Clear to auscultation,   Cardiovascular:  RRR  Abdomen: Soft, non-tender, non-distended with normal bowel

## 2024-05-28 NOTE — PROGRESS NOTES
Palliative Care Department  [phone]  Palliative Care Progress Note  Provider Nisha Mathis, APRN - [facility]    Derek Alvarado  [id number]  Hospital Day: 5  Date of Initial Consult: 5/25/2024  Referring Provider: Dave Martinez MD  Palliative Medicine was consulted for assistance with: Overwhelming symptoms    HPI:   Derek Alvarado is a 64 y.o. with a medical history of anxiety who was admitted on 5/24/2024 from home with a CHIEF COMPLAINT of low back pain.  Patient is very hard of hearing and a poor historian.  States for the past 3 weeks he has been having severe lower back pain and difficulty walking.  MRI showing severe subacute compression fracture of T12 with up to 80% height loss centrally.  Severe chronic compression fractures of L1.  Moderate chronic compression fracture of L4.  Mild chronic compression deformities of T11, L2, L3 and L5.  Erosive changes and associated abnormal enhancement, most pronounced in the left sacrum and right iliac wing, suspicious for bony metastasis.  Patient was admitted for further medical management.  ASSESSMENT/PLAN:     Pertinent Hospital Diagnoses     Lytic bone lesions  Hypercalcemia    Palliative Care Encounter / Counseling Regarding Goals of Care  Please see detailed goals of care discussion as below  At this time, Derek Alvarado, Does have capacity for medical decision-making.  Capacity is time limited and situation/question specific  During encounter there was no surrogate medical decision-maker needed  Outcome of goals of care meeting:   Symptom management  Full code  Code status Full Code  Advanced Directives: no POA or living will in [facility]  Surrogate/Legal NOK:  Michael Alvarado (parent) [phone]  Krista Damian (girlfriend) [phone]    Pain related to neoplasm:   -Continue Dilaudid 0.25 mg IV every 4 hours as needed pain   (Breakthrough pain only; use p.o. first)   -Continue Percocet 5-325 mg every 6 hours as needed pain  Cramps/muscle spasm:   -Continue Flexeril 10 mg 3  data reviewed: labs, images, records, medication use, vitals, and chart    Discussed patient and the plan of care with the other IDT members: Palliative Medicine IDT Team, Primary Team, Floor Nurse, and Patient    Time/Communication  Greater than 50% of time spent, total 25 minutes in counseling and coordination of care at the bedside regarding goals of care, symptom management, and diagnosis and prognosis.    Thank you for allowing Palliative Medicine to participate in the care of Derek Alvarado.

## 2024-05-28 NOTE — CARE COORDINATION
5/28/2024Social work transition of care planning  Sw met with pt. Pt is from home with GF,pt stated that she assists him in the home. Pt pcp is [facility] and pharmacy is [facility]. Pt did not report any hx of hhc or snf. Explained sw role in transition of care planning. Pt is hoping to return home. Sw left naveed list if needed.  The Plan for Transition of Care is related to the following treatment goals: skilled services    The Patient and/or patient representative  was provided with a choice of provider and agrees   with the discharge plan. [x] Yes [] No    Freedom of choice list was provided with basic dialogue that supports the patient's individualized plan of care/goals, treatment preferences and shares the quality data associated with the providers. [x] Yes [] No  Electronically signed by SENDY Santo on 5/28/2024 at 11:06 AM

## 2024-05-29 ENCOUNTER — APPOINTMENT (OUTPATIENT)
Dept: INTERVENTIONAL RADIOLOGY/VASCULAR | Age: 64
End: 2024-05-29
Payer: MEDICAID

## 2024-05-29 LAB
ALBUMIN SERPL-MCNC: 3.1 G/DL (ref 3.5–5.2)
ALP SERPL-CCNC: 50 U/L (ref 40–129)
ALT SERPL-CCNC: <5 U/L (ref 0–40)
ANION GAP SERPL CALCULATED.3IONS-SCNC: 15 MMOL/L (ref 7–16)
AST SERPL-CCNC: 13 U/L (ref 0–39)
B2 MICROGLOB SERPL IA-MCNC: 6.4 MG/L
BASOPHILS # BLD: 0 K/UL (ref 0–0.2)
BASOPHILS NFR BLD: 0 % (ref 0–2)
BILIRUB SERPL-MCNC: 0.8 MG/DL (ref 0–1.2)
BUN SERPL-MCNC: 16 MG/DL (ref 6–23)
CA-I BLD-SCNC: 1.31 MMOL/L (ref 1.15–1.33)
CALCIUM SERPL-MCNC: 9.9 MG/DL (ref 8.6–10.2)
CHLORIDE SERPL-SCNC: 102 MMOL/L (ref 98–107)
CO2 SERPL-SCNC: 19 MMOL/L (ref 22–29)
CREAT SERPL-MCNC: 1.3 MG/DL (ref 0.7–1.2)
EOSINOPHIL # BLD: 0.06 K/UL (ref 0.05–0.5)
EOSINOPHILS RELATIVE PERCENT: 2 % (ref 0–6)
ERYTHROCYTE [DISTWIDTH] IN BLOOD BY AUTOMATED COUNT: 14 % (ref 11.5–15)
FREE KAPPA/LAMBDA RATIO: 0.08 (ref 0.22–1.74)
GFR, ESTIMATED: 60 ML/MIN/1.73M2
GLUCOSE SERPL-MCNC: 91 MG/DL (ref 74–99)
HCT VFR BLD AUTO: 20.6 % (ref 37–54)
HCT VFR BLD AUTO: 24 % (ref 37–54)
HGB BLD-MCNC: 6.9 G/DL (ref 12.5–16.5)
HGB BLD-MCNC: 8.3 G/DL (ref 12.5–16.5)
INR PPP: 1.8
KAPPA LC FREE SER-MCNC: 4.8 MG/L
LAMBDA LC FREE SERPL-MCNC: 57.3 MG/L (ref 4.2–27.7)
LYMPHOCYTES NFR BLD: 0.62 K/UL (ref 1.5–4)
LYMPHOCYTES RELATIVE PERCENT: 20 % (ref 20–42)
MAGNESIUM SERPL-MCNC: 2 MG/DL (ref 1.6–2.6)
MCH RBC QN AUTO: 34.2 PG (ref 26–35)
MCHC RBC AUTO-ENTMCNC: 33.5 G/DL (ref 32–34.5)
MCV RBC AUTO: 102 FL (ref 80–99.9)
METAMYELOCYTES ABSOLUTE COUNT: 0.03 K/UL (ref 0–0.12)
METAMYELOCYTES: 1 % (ref 0–1)
MONOCYTES NFR BLD: 0.31 K/UL (ref 0.1–0.95)
MONOCYTES NFR BLD: 10 % (ref 2–12)
NEUTROPHILS NFR BLD: 68 % (ref 43–80)
NEUTS SEG NFR BLD: 2.18 K/UL (ref 1.8–7.3)
PARTIAL THROMBOPLASTIN TIME: 28.1 SEC (ref 24.5–35.1)
PLATELET # BLD AUTO: 153 K/UL (ref 130–450)
PMV BLD AUTO: 9 FL (ref 7–12)
POTASSIUM SERPL-SCNC: 3.2 MMOL/L (ref 3.5–5)
PROT SERPL-MCNC: 7.8 G/DL (ref 6.4–8.3)
PROTHROMBIN TIME: 19.2 SEC (ref 9.3–12.4)
RBC # BLD AUTO: 2.02 M/UL (ref 3.8–5.8)
RBC # BLD: ABNORMAL 10*6/UL
SODIUM SERPL-SCNC: 136 MMOL/L (ref 132–146)
WBC OTHER # BLD: 3.2 K/UL (ref 4.5–11.5)

## 2024-05-29 PROCEDURE — 82330 ASSAY OF CALCIUM: CPT

## 2024-05-29 PROCEDURE — 77002 NEEDLE LOCALIZATION BY XRAY: CPT

## 2024-05-29 PROCEDURE — 85014 HEMATOCRIT: CPT

## 2024-05-29 PROCEDURE — 30233N1 TRANSFUSION OF NONAUTOLOGOUS RED BLOOD CELLS INTO PERIPHERAL VEIN, PERCUTANEOUS APPROACH: ICD-10-PCS

## 2024-05-29 PROCEDURE — 1200000000 HC SEMI PRIVATE

## 2024-05-29 PROCEDURE — 86901 BLOOD TYPING SEROLOGIC RH(D): CPT

## 2024-05-29 PROCEDURE — 07DR3ZX EXTRACTION OF ILIAC BONE MARROW, PERCUTANEOUS APPROACH, DIAGNOSTIC: ICD-10-PCS

## 2024-05-29 PROCEDURE — 86900 BLOOD TYPING SEROLOGIC ABO: CPT

## 2024-05-29 PROCEDURE — 86923 COMPATIBILITY TEST ELECTRIC: CPT

## 2024-05-29 PROCEDURE — 30233N1 TRANSFUSION OF NONAUTOLOGOUS RED BLOOD CELLS INTO PERIPHERAL VEIN, PERCUTANEOUS APPROACH: ICD-10-PCS | Performed by: INTERNAL MEDICINE

## 2024-05-29 PROCEDURE — 07DR3ZX EXTRACTION OF ILIAC BONE MARROW, PERCUTANEOUS APPROACH, DIAGNOSTIC: ICD-10-PCS | Performed by: RADIOLOGY

## 2024-05-29 PROCEDURE — 6360000002 HC RX W HCPCS: Performed by: INTERNAL MEDICINE

## 2024-05-29 PROCEDURE — 36430 TRANSFUSION BLD/BLD COMPNT: CPT

## 2024-05-29 PROCEDURE — P9016 RBC LEUKOCYTES REDUCED: HCPCS

## 2024-05-29 PROCEDURE — 2580000003 HC RX 258: Performed by: INTERNAL MEDICINE

## 2024-05-29 PROCEDURE — 86850 RBC ANTIBODY SCREEN: CPT

## 2024-05-29 PROCEDURE — 6370000000 HC RX 637 (ALT 250 FOR IP): Performed by: NURSE PRACTITIONER

## 2024-05-29 PROCEDURE — 85025 COMPLETE CBC W/AUTO DIFF WBC: CPT

## 2024-05-29 PROCEDURE — 99231 SBSQ HOSP IP/OBS SF/LOW 25: CPT | Performed by: NURSE PRACTITIONER

## 2024-05-29 PROCEDURE — 97535 SELF CARE MNGMENT TRAINING: CPT

## 2024-05-29 PROCEDURE — 85018 HEMOGLOBIN: CPT

## 2024-05-29 PROCEDURE — 97165 OT EVAL LOW COMPLEX 30 MIN: CPT

## 2024-05-29 PROCEDURE — 6370000000 HC RX 637 (ALT 250 FOR IP): Performed by: STUDENT IN AN ORGANIZED HEALTH CARE EDUCATION/TRAINING PROGRAM

## 2024-05-29 PROCEDURE — 80053 COMPREHEN METABOLIC PANEL: CPT

## 2024-05-29 PROCEDURE — 6370000000 HC RX 637 (ALT 250 FOR IP)

## 2024-05-29 PROCEDURE — 6360000002 HC RX W HCPCS: Performed by: RADIOLOGY

## 2024-05-29 PROCEDURE — 0QB33ZX EXCISION OF LEFT PELVIC BONE, PERCUTANEOUS APPROACH, DIAGNOSTIC: ICD-10-PCS | Performed by: RADIOLOGY

## 2024-05-29 PROCEDURE — 36415 COLL VENOUS BLD VENIPUNCTURE: CPT

## 2024-05-29 PROCEDURE — C1830 POWER BONE MARROW BX NEEDLE: HCPCS

## 2024-05-29 PROCEDURE — 85730 THROMBOPLASTIN TIME PARTIAL: CPT

## 2024-05-29 PROCEDURE — 85610 PROTHROMBIN TIME: CPT

## 2024-05-29 PROCEDURE — 83735 ASSAY OF MAGNESIUM: CPT

## 2024-05-29 PROCEDURE — 38222 DX BONE MARROW BX & ASPIR: CPT

## 2024-05-29 PROCEDURE — 6360000002 HC RX W HCPCS: Performed by: NURSE PRACTITIONER

## 2024-05-29 PROCEDURE — 97530 THERAPEUTIC ACTIVITIES: CPT

## 2024-05-29 PROCEDURE — 97161 PT EVAL LOW COMPLEX 20 MIN: CPT

## 2024-05-29 RX ORDER — SODIUM CHLORIDE 9 MG/ML
INJECTION, SOLUTION INTRAVENOUS PRN
Status: DISCONTINUED | OUTPATIENT
Start: 2024-05-29 | End: 2024-06-01 | Stop reason: HOSPADM

## 2024-05-29 RX ORDER — SODIUM CHLORIDE 0.9 % (FLUSH) 0.9 %
5-40 SYRINGE (ML) INJECTION 2 TIMES DAILY
Status: DISCONTINUED | OUTPATIENT
Start: 2024-05-29 | End: 2024-06-01 | Stop reason: HOSPADM

## 2024-05-29 RX ORDER — FENTANYL CITRATE 50 UG/ML
INJECTION, SOLUTION INTRAMUSCULAR; INTRAVENOUS PRN
Status: COMPLETED | OUTPATIENT
Start: 2024-05-29 | End: 2024-05-29

## 2024-05-29 RX ORDER — SODIUM CHLORIDE 0.9 % (FLUSH) 0.9 %
5-40 SYRINGE (ML) INJECTION PRN
Status: DISCONTINUED | OUTPATIENT
Start: 2024-05-29 | End: 2024-06-01 | Stop reason: HOSPADM

## 2024-05-29 RX ORDER — ACETAMINOPHEN 160 MG/5ML
650 LIQUID ORAL SEE ADMIN INSTRUCTIONS
Status: COMPLETED | OUTPATIENT
Start: 2024-05-29 | End: 2024-06-01

## 2024-05-29 RX ORDER — DEXAMETHASONE 4 MG/1
40 TABLET ORAL DAILY
Status: DISCONTINUED | OUTPATIENT
Start: 2024-05-29 | End: 2024-06-01 | Stop reason: HOSPADM

## 2024-05-29 RX ORDER — DIPHENHYDRAMINE HYDROCHLORIDE 50 MG/ML
INJECTION INTRAMUSCULAR; INTRAVENOUS PRN
Status: COMPLETED | OUTPATIENT
Start: 2024-05-29 | End: 2024-05-29

## 2024-05-29 RX ORDER — DIPHENHYDRAMINE HYDROCHLORIDE 50 MG/ML
25 INJECTION INTRAMUSCULAR; INTRAVENOUS SEE ADMIN INSTRUCTIONS
Status: DISCONTINUED | OUTPATIENT
Start: 2024-05-29 | End: 2024-06-01 | Stop reason: HOSPADM

## 2024-05-29 RX ORDER — SENNA AND DOCUSATE SODIUM 50; 8.6 MG/1; MG/1
1 TABLET, FILM COATED ORAL DAILY
Status: DISCONTINUED | OUTPATIENT
Start: 2024-05-29 | End: 2024-06-01 | Stop reason: HOSPADM

## 2024-05-29 RX ORDER — MIDAZOLAM HYDROCHLORIDE 2 MG/2ML
INJECTION, SOLUTION INTRAMUSCULAR; INTRAVENOUS PRN
Status: COMPLETED | OUTPATIENT
Start: 2024-05-29 | End: 2024-05-29

## 2024-05-29 RX ADMIN — MIDAZOLAM HYDROCHLORIDE 0.5 MG: 1 INJECTION, SOLUTION INTRAMUSCULAR; INTRAVENOUS at 14:30

## 2024-05-29 RX ADMIN — FOLIC ACID 1 MG: 1 TABLET ORAL at 11:54

## 2024-05-29 RX ADMIN — CYCLOBENZAPRINE 10 MG: 10 TABLET, FILM COATED ORAL at 12:48

## 2024-05-29 RX ADMIN — OXYCODONE 5 MG: 5 TABLET ORAL at 11:54

## 2024-05-29 RX ADMIN — MIDAZOLAM HYDROCHLORIDE 0.5 MG: 1 INJECTION, SOLUTION INTRAMUSCULAR; INTRAVENOUS at 14:55

## 2024-05-29 RX ADMIN — POTASSIUM BICARBONATE 40 MEQ: 782 TABLET, EFFERVESCENT ORAL at 11:54

## 2024-05-29 RX ADMIN — DEXAMETHASONE 40 MG: 4 TABLET ORAL at 20:51

## 2024-05-29 RX ADMIN — SODIUM CHLORIDE, PRESERVATIVE FREE 10 ML: 5 INJECTION INTRAVENOUS at 20:51

## 2024-05-29 RX ADMIN — HYDROXYZINE PAMOATE 25 MG: 25 CAPSULE ORAL at 20:50

## 2024-05-29 RX ADMIN — FENTANYL CITRATE 25 MCG: 50 INJECTION, SOLUTION INTRAMUSCULAR; INTRAVENOUS at 14:31

## 2024-05-29 RX ADMIN — SODIUM CHLORIDE, POTASSIUM CHLORIDE, SODIUM LACTATE AND CALCIUM CHLORIDE: 600; 310; 30; 20 INJECTION, SOLUTION INTRAVENOUS at 20:47

## 2024-05-29 RX ADMIN — OXYCODONE 5 MG: 5 TABLET ORAL at 03:06

## 2024-05-29 RX ADMIN — OXYCODONE 5 MG: 5 TABLET ORAL at 20:50

## 2024-05-29 RX ADMIN — DIPHENHYDRAMINE HYDROCHLORIDE 25 MG: 50 INJECTION, SOLUTION INTRAMUSCULAR; INTRAVENOUS at 14:30

## 2024-05-29 RX ADMIN — HYDROMORPHONE HYDROCHLORIDE 0.25 MG: 1 INJECTION, SOLUTION INTRAMUSCULAR; INTRAVENOUS; SUBCUTANEOUS at 16:10

## 2024-05-29 RX ADMIN — FENTANYL CITRATE 25 MCG: 50 INJECTION, SOLUTION INTRAMUSCULAR; INTRAVENOUS at 14:56

## 2024-05-29 RX ADMIN — SODIUM CHLORIDE, POTASSIUM CHLORIDE, SODIUM LACTATE AND CALCIUM CHLORIDE: 600; 310; 30; 20 INJECTION, SOLUTION INTRAVENOUS at 04:07

## 2024-05-29 RX ADMIN — HYDRALAZINE HYDROCHLORIDE 5 MG: 20 INJECTION, SOLUTION INTRAMUSCULAR; INTRAVENOUS at 20:50

## 2024-05-29 ASSESSMENT — PAIN DESCRIPTION - DESCRIPTORS
DESCRIPTORS: ACHING;DISCOMFORT;SORE
DESCRIPTORS: ACHING;DISCOMFORT;SORE
DESCRIPTORS: ACHING;THROBBING;TIGHTNESS
DESCRIPTORS: ACHING;CRAMPING;SORE
DESCRIPTORS: ACHING;DISCOMFORT;SORE

## 2024-05-29 ASSESSMENT — PAIN SCALES - GENERAL
PAINLEVEL_OUTOF10: 9
PAINLEVEL_OUTOF10: 5
PAINLEVEL_OUTOF10: 9
PAINLEVEL_OUTOF10: 7
PAINLEVEL_OUTOF10: 7
PAINLEVEL_OUTOF10: 5
PAINLEVEL_OUTOF10: 8

## 2024-05-29 ASSESSMENT — PAIN DESCRIPTION - LOCATION
LOCATION: BACK
LOCATION: CHEST;FLANK

## 2024-05-29 ASSESSMENT — PAIN DESCRIPTION - ORIENTATION
ORIENTATION: MID;LOWER
ORIENTATION: LEFT;LOWER

## 2024-05-29 ASSESSMENT — PAIN - FUNCTIONAL ASSESSMENT: PAIN_FUNCTIONAL_ASSESSMENT: PREVENTS OR INTERFERES SOME ACTIVE ACTIVITIES AND ADLS

## 2024-05-29 ASSESSMENT — PAIN DESCRIPTION - ONSET: ONSET: ON-GOING

## 2024-05-29 ASSESSMENT — PAIN DESCRIPTION - FREQUENCY: FREQUENCY: CONTINUOUS

## 2024-05-29 ASSESSMENT — PAIN DESCRIPTION - PAIN TYPE: TYPE: CHRONIC PAIN

## 2024-05-29 ASSESSMENT — PAIN SCALES - WONG BAKER: WONGBAKER_NUMERICALRESPONSE: NO HURT

## 2024-05-29 NOTE — PRE SEDATION
Sedation Pre-Procedure Note    Patient Name: Derek Alvarado   YOB: 1960  Room/Bed: 5417/5417-B  Medical Record Number: [id number]  Date: 5/29/2024   Time: 3:01 PM       Indication:  anemia    Consent: I have discussed with the patient and/or the patient representative the indication, alternatives, and the possible risks and/or complications of the planned procedure and the anesthesia methods. The patient and/or patient representative appear to understand and agree to proceed.    Vital Signs:   Vitals:    05/29/24 1459   BP: (!) 152/77   Pulse: 92   Resp: 17   Temp:    SpO2: 98%       Past Medical History:   has a past medical history of Anxiety.    Past Surgical History:   has no past surgical history on file.    Medications:   Scheduled Meds:    acetaminophen  650 mg Oral See Admin Instructions    diphenhydrAMINE  25 mg IntraVENous See Admin Instructions    sennosides-docusate sodium  1 tablet Oral Daily    magnesium oxide  400 mg Oral Daily    pamidronate (AREDIA) 30 mg in sodium chloride 0.9 % 500 mL infusion  30 mg IntraVENous Once    folic acid  1 mg Oral Daily    cyanocobalamin  1,000 mcg IntraMUSCular Daily    [Held by provider] enoxaparin  40 mg SubCUTAneous Daily     Continuous Infusions:    sodium chloride      lactated ringers IV soln 75 mL/hr at 05/29/24 0407     PRN Meds: sodium chloride, midazolam, fentanNYL, diphenhydrAMINE, oxyCODONE, diphenhydrAMINE-zinc acetate, HYDROmorphone, hydrALAZINE, cyclobenzaprine, sennosides-docusate sodium, ondansetron **OR** ondansetron, hydrOXYzine pamoate  Home Meds:   Prior to Admission medications    Medication Sig Start Date End Date Taking? Authorizing Provider   hydrOXYzine pamoate (VISTARIL) 25 MG capsule take 1 capsule by mouth three times a day if needed for itching or anxiety 12/13/22   John Nuno APRN - CNP   potassium chloride (KLOR-CON M) 10 MEQ extended release tablet take 1 tablet by mouth once daily  Patient not taking: Reported on

## 2024-05-29 NOTE — PROGRESS NOTES
OCCUPATIONAL THERAPY INITIAL EVALUATION    [facility]  [address], [place]        Date:2024                                                  Patient Name: Derek Alvarado    MRN: [id number]    : 1960    Room: [facility]      Evaluating OT: Edna Whittaker OTR/L; [id number]       Referring Provider: Claire Ko MD     Specific Provider Orders/Date: OT Eval and Treat 24       Diagnosis: Hypercalcemia;  Acute right-sided low back pain with sciatica; Lytic bone lesions on xray; Macrocytic anemia; Multiple myeloma not having achieved remission.     Surgery: None this admission.     Pertinent Medical History:  has a past medical history of Anxiety.     Recommended Adaptive Equipment: TBD     Precautions:  Fall Risk, cognition, +alarms, [place], TLSO, spinal precautions (compression fx T12, L1 & L4 - chronic, compression deformities T11/L2/L3/L5), NPO     Assessment of current deficits    [x] Functional mobility  [x]ADLs  [x] Strength               [x]Cognition    [x] Functional transfers   [x] IADLs         [x] Safety Awareness   [x]Endurance    [x] Fine Coordination              [x] Balance      [] Vision/perception   []Sensation     []Gross Motor Coordination  [] ROM  [] Delirium                   [] Motor Control     OT PLAN OF CARE   OT POC based on physician orders, patient diagnosis and results of clinical assessment    Frequency/Duration 3-5 days/wk for 2 weeks PRN   Specific OT Treatment Interventions to include:   * Instruction/training on adapted ADL techniques and AE recommendations to increase functional independence within precautions       * Training on energy conservation strategies, correct breathing pattern and techniques to improve independence/tolerance for self-care routine  * Functional transfer/mobility training/DME recommendations for increased independence, safety, and fall prevention  * Patient/Family education to

## 2024-05-29 NOTE — PROGRESS NOTES
Department of Internal Medicine  Nephrology Attending Progress Note    Events reviewed     SUBJECTIVE: we are following Mr Alvarado for hypercalcemia and elevated creatinine. Complaining of back pain.     PHYSICAL EXAM:      Vitals:    VITALS:  BP (!) 151/80   Pulse 82   Temp 98.1 °F (36.7 °C) (Temporal)   Resp 16   Wt 68 kg (150 lb)   SpO2 93%   BMI 22.15 kg/m²   24HR INTAKE/OUTPUT:    Intake/Output Summary (Last 24 hours) at 5/29/2024 1200  Last data filed at 5/29/2024 0315  Gross per 24 hour   Intake 50 ml   Output 950 ml   Net -900 ml         Constitutional: Patient is awake, alert, in no distress  HEENT: Pupils equal reactive  Respiratory: Lungs are clear  Cardiovascular/Edema: Heart sounds are regular  Gastrointestinal: Abdomen soft  Neurologic: No focal  Skin: No lesions  Other: No edema    Scheduled Meds:   acetaminophen  650 mg Oral See Admin Instructions    diphenhydrAMINE  25 mg IntraVENous See Admin Instructions    sennosides-docusate sodium  1 tablet Oral Daily    magnesium oxide  400 mg Oral Daily    pamidronate (AREDIA) 30 mg in sodium chloride 0.9 % 500 mL infusion  30 mg IntraVENous Once    folic acid  1 mg Oral Daily    cyanocobalamin  1,000 mcg IntraMUSCular Daily    [Held by provider] enoxaparin  40 mg SubCUTAneous Daily     Continuous Infusions:   sodium chloride      lactated ringers IV soln 75 mL/hr at 05/29/24 0407     PRN Meds:.sodium chloride, oxyCODONE, diphenhydrAMINE-zinc acetate, HYDROmorphone, hydrALAZINE, cyclobenzaprine, sennosides-docusate sodium, ondansetron **OR** ondansetron, hydrOXYzine pamoate      DATA:    CBC:   Lab Results   Component Value Date/Time    WBC 3.2 05/29/2024 04:36 AM    RBC 2.02 05/29/2024 04:36 AM    HGB 6.9 05/29/2024 04:36 AM    HCT 20.6 05/29/2024 04:36 AM    .0 05/29/2024 04:36 AM    MCH 34.2 05/29/2024 04:36 AM    MCHC 33.5 05/29/2024 04:36 AM    RDW 14.0 05/29/2024 04:36 AM     05/29/2024 04:36 AM    MPV 9.0 05/29/2024 04:36 AM     CMP:     Lab Results   Component Value Date/Time     05/29/2024 04:36 AM    K 3.2 05/29/2024 04:36 AM     05/29/2024 04:36 AM    CO2 19 05/29/2024 04:36 AM    BUN 16 05/29/2024 04:36 AM    CREATININE 1.3 05/29/2024 04:36 AM    GFRAA >60 07/27/2021 07:55 AM    LABGLOM 60 05/29/2024 04:36 AM    GLUCOSE 91 05/29/2024 04:36 AM    CALCIUM 9.9 05/29/2024 04:36 AM    BILITOT 0.8 05/29/2024 04:36 AM    ALKPHOS 50 05/29/2024 04:36 AM    AST 13 05/29/2024 04:36 AM    ALT <5 05/29/2024 04:36 AM     Magnesium:    Lab Results   Component Value Date/Time    MG 2.0 05/29/2024 04:36 AM     Phosphorus:    Lab Results   Component Value Date/Time    PHOS 3.1 05/25/2024 07:27 AM       Radiology Review:      XR BONE SURVEY COMPLETE  5/24/24    IMPRESSION:  1. Ill-defined permeative lytic changes are seen involving the diaphysis of  the calvarium, humerus, scapula, pelvic bones and femur. Lytic focus in the  distal half of the right humerus measures up to 1.8 cm in size.  These  findings are suspicious for multiple myeloma or diffuse metastasis.  2. Minimally displaced fracture of the right posterior 6th rib.  Multiple  osteoporotic insufficiency fractures are seen in the thoracolumbar spine.    BRIEF SUMMARY OF INITIAL CONSULT:    Briefly Mr. Alvarado is a 64-year-old male with history of HTN, nephrolithiasis, hyperlipidemia, hard of hearing, who was admitted on May 24, 2024 transferred from [facility], where he initially presented with altered mental status back pain and was found to have hypercalcemia with calcium >14, he has well had a CT scan of the spine which showed multiple lytic lesions with compression fracture reason for his transferring.  On admission he was found to have a sodium level of 131, creatinine 1.6 mg/dL and calcium level 12.2 mg/dL, reasons for this consultation.  Prior to admission his medications included HCTZ.     Problems resolved:  Hyponatremia, probably hemodynamically mediated versus

## 2024-05-29 NOTE — PROGRESS NOTES
Tried to reach out Dr. Sheik milan though a call but it was not  picked. The subject matter was about the low HB of 6.9 ,from this patient lab. Results .this will be followed by day shift staff.

## 2024-05-29 NOTE — PROGRESS NOTES
Palliative Care Department  [phone]  Palliative Care Progress Note  Provider Robyn Piper, APRN - CNP    Derek Alvarado  [id number]  Hospital Day: 6  Date of Initial Consult: 5/25/2024  Referring Provider: Dave Martinez MD  Palliative Medicine was consulted for assistance with: Overwhelming symptoms    HPI:   Derek Alvarado is a 64 y.o. with a medical history of anxiety who was admitted on 5/24/2024 from home with a CHIEF COMPLAINT of low back pain.  Patient is very hard of hearing and a poor historian.  States for the past 3 weeks he has been having severe lower back pain and difficulty walking.  MRI showing severe subacute compression fracture of T12 with up to 80% height loss centrally.  Severe chronic compression fractures of L1.  Moderate chronic compression fracture of L4.  Mild chronic compression deformities of T11, L2, L3 and L5.  Erosive changes and associated abnormal enhancement, most pronounced in the left sacrum and right iliac wing, suspicious for bony metastasis.  Patient was admitted for further medical management.  ASSESSMENT/PLAN:     Pertinent Hospital Diagnoses     Lytic bone lesions  Hypercalcemia    Palliative Care Encounter / Counseling Regarding Goals of Care  Please see detailed goals of care discussion as below  At this time, Derek Alvarado, Does have capacity for medical decision-making.  Capacity is time limited and situation/question specific  During encounter there was no surrogate medical decision-maker needed  Outcome of goals of care meeting:   Symptom management  Full code  Code status Full Code  Advanced Directives: no POA or living will in [facility]  Surrogate/Legal NOK:  Michael Alvarado (parent) [phone]  Krista Nati (girlfriend) [phone]    Pain related to neoplasm:   -Continue Dilaudid 0.25 mg IV every 4 hours as needed pain   (Breakthrough pain only; use p.o. first)   -Continue Percocet 5-325 mg every 6 hours as needed pain  Cramps/muscle spasm:   -Continue Flexeril 10 mg 3  Patient: [place] Barefoot Date: 2020   : 1960    63year old male      [place] / Hyperbaric Medicine Physician: Not Applicable  Consulting Provider:  Emmanuel Dickey MD  Date of Consultation/Last Comprehensive Exam:  2020  Referring  Provider:  None    SUBJECTIVE:    Chief Complaint:    Left foot drainage    Wound/Ulcer Present:    Diabetic lower extremity ulcer:  Jain grade 1 (superficial diabetic ulcer). Diabetic foot exam performed? Yes. Current Vascular Assessment:  Physical exam.     Current Antibiotic Regimen:  None. Current Offloading Modality:  Shoe insert./ knee scooter      Additional Wound Category:  None     Maximum Baseline Ambulatory Status:  Ambulates unassisted    History of Present Illness: This is a 63year old male with type 2 diabetes, s/p renal transplant with previous history of the DFU involving osteomyelitis of the plantar aspect of the left midfoot presents to the wound care clinic for evaluation of his left foot. A few days previously, the patient and his wife observed a small amount of bloody drainage of the left midfoot. This drainage had a slight odor. The patient denies fever or chills. Patient reports good glucose control. He denies known peripheral vascular disease. His renal transplant is doing well. The patient is using old orthotics for offloading. His orthotics are scheduled to be evaluated in 2020. Patient states that he has been using a treadmill on a daily basis to walk 1-2 miles daily. Patient has a knee scooter at home for offloading if needed - having previously use this when he was managing ulceration at the same site 3-4 years ago. The patient returns today for follow up. He has been using his his knee scooter and DH shoe. No increase in drainage. No fevers and chills.      Current Treatment Regimen:  Dressing:  iodosorb   Frequency:  Every other day   Changed by: Family    Review of Systems:  Pertinent items are noted in HPI (history of present illness). Past Medical History:   Diagnosis Date   â¢ Anemia    â¢ Blind left eye 2006    diabetic retinopathy   â¢ Blood clot associated with vein wall inflammation      right arm due to picc line   â¢ Bronchitis    â¢ Chronic kidney disease (CKD), stage IV (severe) (CMS/HCC) 2/28/14    previously on hemodialysis   â¢ Diabetes mellitus type 2 with complications (CMS/HCC) 47/4/9809   â¢ Diabetic retinopathy of both eyes (CMS/HCC) 12/6/2012   â¢ Diabetic ulcer of toe of right foot associated with type 2 diabetes mellitus, limited to breakdown of skin (CMS/HCC) 9/5/2018   â¢ Dialysis care 2008    end stage renal disease, none since kidney transplant   â¢ Diverticulosis 11/06/2019    mild sigmoid   â¢ DM (diabetes mellitus) (CMS/HCC) 1992   â¢ Dyslipidemia 12/6/2012   â¢ Essential hypertension, benign 12/6/2012   â¢ Fracture     fx finger, clavicle   â¢ High cholesterol    â¢ History of MRSA infection     left foot Aug 2011   â¢ History of penile implant    â¢ History of renal transplant 12/2012   â¢ Hyperparathyroid bone disease (CMS/HCC)    â¢ IgG monoclonal gammopathy 2/28/14    to follow with Dr. Ilan Barrow   â¢ Nephropathy, diabetic (CMS/HCC) 12/6/2012    kidney transplant 2012   â¢ Obesity    â¢ Personal history of colonic polyps 05/09/2014    tubular adenoma   â¢ Personal history of colonic polyps 11/06/2019    tubular adenoma x2 and tubulovillous adenoma with high grade dyplasia    â¢ Pneumonia 10/2015   â¢ Secondary hyperparathyroidism (CMS/[facility])    â¢ Sleep apnea     biPAP   â¢ Staphylococcus aureus infection 2011   â¢ Thyroid condition     parathyroid due to kidney issues     Past Surgical History:   Procedure Laterality Date   â¢ ------------other-------------  2011    left toe removed, 5th, osteomylitis.     â¢ Blood transfusion service  10/01/2010    1 unit PRBCs   â¢ Cardiac catherization      cardiac cath   â¢ Colonoscopy  05/09/2014    Dr. Jacob Yo removed recall due 5/2019   â¢ Colonoscopy diagnostic  11/06/2019    Dr. Alla Sun and TVA removed with 3yr recall due 11/2022   â¢ Esophagogastroduodenoscopy  12/15/2011   â¢ Eye surgery  2007    both eyes, 6 procedures   â¢ Foot surgery  12/29/2016    Diabetic foot ulcer with osteomyelitis   â¢ Kidney transplant  12/08/2012    rt side. â¢ Laser surgery of eye  01/01/2006   â¢ Penile prosthesis implant  2001   â¢ Removal gallbladder  1992   â¢ Toe amputation      little toe left foot     Social History     Socioeconomic History   â¢ Marital status: /Civil Union     Spouse name: Not on file   â¢ Number of children: Not on file   â¢ Years of education: Not on file   â¢ Highest education level: Not on file   Occupational History   â¢ Occupation: retired / disabled     Comment: exposures to smoke, denies other exposures   Social Needs   â¢ Financial resource strain: Not on file   â¢ Food insecurity:     Worry: Not on file     Inability: Not on file   â¢ Transportation needs:     Medical: Not on file     Non-medical: Not on file   Tobacco Use   â¢ Smoking status: Never Smoker   â¢ Smokeless tobacco: Never Used   Substance and Sexual Activity   â¢ Alcohol use: No     Frequency: Monthly or less     Drinks per session: 1 or 2     Binge frequency: Never     Comment: once per year.    â¢ Drug use: No   â¢ Sexual activity: Yes     Partners: Female   Lifestyle   â¢ Physical activity:     Days per week: Not on file     Minutes per session: Not on file   â¢ Stress: Not on file   Relationships   â¢ Social connections:     Talks on phone: Not on file     Gets together: Not on file     Attends Caodaism service: Not on file     Active member of club or organization: Not on file     Attends meetings of clubs or organizations: Not on file     Relationship status: Not on file   â¢ Intimate partner violence:     Fear of current or ex partner: Not on file     Emotionally abused: Not on file     Physically abused: Not on file     Forced sexual activity: Not on file Other Topics Concern   â¢  Service No   â¢ Blood Transfusions Yes     Comment: in [address], low hemoglobin 2011   â¢ Caffeine Concern No   â¢ Occupational Exposure No   â¢ Hobby Hazards No   â¢ Sleep Concern No   â¢ Stress Concern No   â¢ Weight Concern No   â¢ Special Diet Yes     Comment: arenal diet   â¢ Back Care No   â¢ Exercise No   â¢ Bike Helmet No   â¢ Seat Belt Yes   â¢ Self-Exams No   Social History Narrative   â¢ Not on file     Family History   Problem Relation Age of Onset   â¢ Tuberculosis Mother    â¢ Cancer, Breast Mother    â¢ Hypertension Father    â¢ Blood Disorder Father    â¢ Obesity Father    â¢ Diabetes Brother    â¢ Cancer Brother         throat   â¢ Psychiatric Brother    â¢ Diabetes Brother    â¢ Patient is unaware of any medical problems Sister    â¢ Diabetes Maternal Grandmother    â¢ Dementia/Alzheimers Maternal Grandmother    â¢ Cancer, Lung Maternal Grandfather    â¢ Cancer, Lung Paternal Grandmother    â¢ Cancer, Lung Paternal Grandfather    â¢ Diabetes Maternal Uncle    â¢ Cancer, Prostate Maternal Uncle    â¢ Cancer Paternal Uncle         unknown type   â¢ Alcohol Abuse Paternal Uncle        Current Outpatient Medications   Medication Sig   â¢ pravastatin (PRAVACHOL) 10 MG tablet TAKE 1 TABLET BY MOUTH EVERY DAY IN THE EVENING   â¢ famotidine (PEPCID) 20 MG tablet TAKE 1 TABLET BY MOUTH EVERY DAY EVERY NIGHT   â¢ Insulin Pen Needle (BD ULTRA-FINE PEN NEEDLES) 29G X 12.7MM Misc Use 6 times daily to inject insulin   â¢ albuterol-ipratropium (COMBIVENT RESPIMAT) 100-20 MCG/ACT inhaler Inhale 1 puff into the lungs 4 times daily. â¢ insulin lispro (HUMALOG KWIKPEN) 100 UNIT/ML pen-injector Inject 4 times daily per sliding scale. â¢ polyethylene glycol-electrolytes (NULYTELY WITH FLAVOR PACKS) 420 g solution Take as directed on the written prep instructions from your MD's office. â¢ calcium acetate gelcap (PHOSLO) 667 MG Cap Take 1 capsule by mouth 3 times daily (with meals).    â¢ blood glucose test strip Test blood sugar 3 times daily as directed. Diagnosis: E11. 9. Prodigy No Coding Test Strips   â¢ insulin NPH (HUMULIN N KWIKPEN) 100 UNIT/ML pen-injector Inject subcutaneously 15 units every morning and 8 units every night, or as directed by your doctor. â¢ furosemide (LASIX) 40 MG tablet Take 40 mg by mouth. â¢ ferrous sulfate 325 (65 FE) MG tablet Take 325 mg by mouth daily (with breakfast). â¢ sodium bicarbonate 650 MG tablet Take 650 mg by mouth 2 times daily. â¢ calcium carbonate (TUMS) 500 MG chewable tablet Chew 2,000 mg by mouth daily (with dinner). â¢ cinacalcet (SENSIPAR) 30 MG tablet Take 30 mg by mouth daily. â¢ predniSONE (DELTASONE) 5 MG tablet Take 1 tablet by mouth 2 times daily. â¢ amLODIPine (NORVASC) 10 MG tablet Take 10 mg by mouth daily. Indications: High Blood Pressure Disorder    â¢ tacrolimus (PROGRAF) 1 MG capsule Take 5 mg by mouth 2 times daily. â¢ Cholecalciferol (VITAMIN D3) 2000 UNITS Tab Take 4,000 mg by mouth Daily. â¢ darbepoetin darlene (ARANESP) 100 MCG/0.5ML Solution Inject 0.5 mLs into the skin once. rx by transplant team take as needed   â¢ azaTHIOprine (IMURAN) 50 MG tablet Take 100 mg by mouth daily (with dinner). To prevent rejection   â¢ metoPROLOL (LOPRESSOR) 25 MG tablet Take 1 tablet by mouth every 12 hours. No current facility-administered medications for this encounter. ALLERGIES: no known allergies. OBJECTIVE:  Vital Signs:    Visit Vitals  /65 (BP Location: RUE - Right upper extremity, Patient Position: Sitting)   Pulse 67   Temp 96.3 Â°F (35.7 Â°C) (Temporal)   Resp 18         Physical Exam:  General appearance: Alert, oriented to person, place, time and situation, in no distress and cooperative   Bilateral lower extremity edema. Left foot-warm and without obvious ischemic change. Left foot-flat-loss of arch. Left plantar mid foot has a heavy build up of callus. Small pea sized wound centrally with a fibrotic base.  Wound debridement performed to bleeding viable base. Post debridement there is a small open wound with central bridge of damon-epithelium. + contraction. No drainage. No cellulitis    . Wound Bed Quality:  Granulation tissue and Damon-epithelialization      Janet-wound Quality:    Callus    Additional Descriptors:  See above     Wound Measurements Per Flowsheet:    Wound Leg Left Lateral Non-pressure ulcer (Active)   Number of days: 1239       Wound Foot Left Plantar Surgical incision (Active)   Number of days: 1117       Wound Foot Left Plantar Surgical incision (Active)   Number of days: 1104       Wound Leg Left Anterior;Distal Non-pressure injury (Active)   Number of days: 1088       Wound Toe, 5th Right (Active)   Number of days: 503     Wound Foot Left Plantar Diabetic Ulcer (Active)   Number of days: 250       Wound Foot Left Plantar (Active)   Wound Length (cm) 0.4 cm 1/20/2020 10:00 AM   Wound Width (cm) 0.5 cm 1/20/2020 10:00 AM   Wound Depth (cm) 0.3 cm 1/20/2020 10:00 AM   Wound Surface Area (cm^2) 0.2 cm^2 1/20/2020 10:00 AM   Wound Volume (cm^3) 0.06 cm^3 1/20/2020 10:00 AM   Number of days: 6         PROCEDURE:  Debridement: Selective Non-Excisional Debridement of Non-viable Tissue. Informed consent obtained. Time out was completed. Patient, procedure, and site verified. Anesthesia-  None  Size-  Less than or equal to 20 sq cm  Instrument-  Curette  Non-viable tissue removed-  Fibrin/Slough and Epidermis/dermis  Hemostasis achieved-  Pressure  Patient procedure was-  tolerated well, no complications. Refer to nursing notes for wound dimensions and assessment. Patient stable upon completion of procedure.      Procedure was Performed by:  Nurse Practitioner-    Laboratory assessments reviewed:  No results found for: PAB   Albumin (g/dL)   Date Value   11/15/2019 3.6   10/11/2019 3.8   08/21/2019 3.8      No results available in last 24 hours    Lab Results   Component Value Date    WBC 6.1 11/15/2019    GLUCOSE 106 (H) 11/15/2019    HGBA1C 5.6 02/07/2019    CRP 0.5 06/16/2017    RESR 33 (H) 06/16/2017    CREATININE 2.39 (H) 11/15/2019    GFRA 33 11/15/2019    GFRNA 29 11/15/2019        Culture results:  Specimen Description (no units)   Date Value   08/25/2017 SWAB KNEE RIGHT   06/08/2017 SWAB FOOT LEFT   03/15/2017 SWAB NARES   02/24/2017 SWAB FOOT LEFT    CULTURE (no units)   Date Value   08/25/2017 (P)    RARE STAPHYLOCOCCUS, COAGULASE NEGATIVE (MULTIPLE VARIETIES)   06/08/2017 MANY KLEBSIELLA PNEUMONIAE SSP. PNEUMONIAE (P)   06/08/2017 (P)    RARE STAPHYLOCOCCUS, COAGULASE NEGATIVE (MULTIPLE VARIETIES)   03/15/2017     NO METHICILLIN RESISTANT STAPHYLOCOCCUS AUREUS ISOLATED        Diagnostic Assessments Reviewed:  No new update    Nutritional Assessment:  Prealbumin and/or Albumin reviewed    Wound treatment goals are palliative:  No    DIAGNOSES:  Diabetic lower extremity ulcer, Jain grade 1 (superficial diabetic ulcer) - left foot-plantar aspect of midfoot. S/p renal transplant-on immunosuppression      Medical Decision Making:     Ulceration much improved. Debridement performed today. No clinical infection    Continue offloading in [address] shoe and knee scooter. Would also have low threshold for formal vascular imaging should patient not make significant clinical progress. Although patient reports satisfactory blood sugars, consider checking hemoglobin A1 c should patient not make significant clinical progress. Lower extremity elevation encouraged. Topical wound Care-Iodoflex/dry secondary dressing-change 3 times weekly. Let's keep a close eye on him. Provider followup-1 week. Plan of Care: Advanced Wound Care Recommendations:  See above  Percent Wound Closure from consult:  N/A  Care plan to augment wound closure:  Not applicable. Patient stable. All questions were answered.      Van Lloyd NP

## 2024-05-29 NOTE — PROGRESS NOTES
Hospitalist Progress Note      PCP: John Nuno APRN - CNP    Date of Admission: 5/24/2024        Hospital Course:  64 y.o. male presented with LOW BACK PAIN.  HE IS VERY [place] AND A POOR HISTORIAN.  HE STATES FOR THE PAST 3 WEEKS HE HAS HAD SEVERE LBP AND DIFFICULTY WALKING.  HIS   GIRLFRIEND WAS RUBBING HIS LOWER BACK WITH A PAIN CREAM AND IT DID NOT WORK. HE DENIES FALLING       5/26 FOR TSLO BRACE    Subjective:     Resting in bed  Just worked with therapy   Plans for bone marrow biopsy today       Medications:  Reviewed    Infusion Medications    sodium chloride      lactated ringers IV soln 75 mL/hr at 05/29/24 0407     Scheduled Medications    acetaminophen  650 mg Oral See Admin Instructions    diphenhydrAMINE  25 mg IntraVENous See Admin Instructions    sennosides-docusate sodium  1 tablet Oral Daily    magnesium oxide  400 mg Oral Daily    pamidronate (AREDIA) 30 mg in sodium chloride 0.9 % 500 mL infusion  30 mg IntraVENous Once    folic acid  1 mg Oral Daily    cyanocobalamin  1,000 mcg IntraMUSCular Daily    [Held by provider] enoxaparin  40 mg SubCUTAneous Daily     PRN Meds: sodium chloride, oxyCODONE, diphenhydrAMINE-zinc acetate, HYDROmorphone, hydrALAZINE, cyclobenzaprine, sennosides-docusate sodium, ondansetron **OR** ondansetron, hydrOXYzine pamoate      Intake/Output Summary (Last 24 hours) at 5/29/2024 1248  Last data filed at 5/29/2024 1218  Gross per 24 hour   Intake 50 ml   Output 1150 ml   Net -1100 ml         Exam:    BP (!) 153/93   Pulse (!) 106   Temp 97.5 °F (36.4 °C) (Temporal)   Resp 17   Wt 68 kg (150 lb)   SpO2 93%   BMI 22.15 kg/m²         eneral appearance:  No apparent distress, appears stated age.  HEENT:  Normal cephalic, [place]  Neck: Supple, with full range of motion. No jugular venous distention. Trachea midline.  Respiratory:  Normal respiratory effort. Clear to auscultation,   Cardiovascular:  RRR  Abdomen: Soft, non-tender, non-distended with normal bowel  sounds.  Musculoskeletal:  No clubbing, cyanosis or edema bilaterally.    Skin: smooth and dry   Neurologic:   Cranial nerves: II-XII intact,   Psychiatric:  Alert and oriented x 3                     Labs:   Recent Labs     05/27/24 0444 05/28/24  1008 05/29/24  0436   WBC 3.0* 3.1* 3.2*   HGB 7.0* 7.0* 6.9*   HCT 21.4* 21.8* 20.6*    159 153       Recent Labs     05/27/24 0444 05/28/24  1008 05/29/24  0436    137 136   K 3.2* 3.5 3.2*    106 102   CO2 18* 18* 19*   BUN 25* 19 16   CREATININE 1.7* 1.5* 1.3*   CALCIUM 11.2* 9.3 9.9       Recent Labs     05/27/24 0444 05/28/24  1008 05/29/24  0436   AST 13 13 13   ALT 5 5 <5   BILITOT 1.1 0.8 0.8   ALKPHOS 50 52 50       Recent Labs     05/29/24  0436   INR 1.8       No results for input(s): \"CKTOTAL\", \"TROPONINI\" in the last 72 hours.  Recent Labs     05/27/24 0444 05/28/24  1008 05/29/24  0436   AST 13 13 13   ALT 5 5 <5   BILITOT 1.1 0.8 0.8   ALKPHOS 50 52 50       No results for input(s): \"LACTA\" in the last 72 hours.  No results found for: \"LABURIC\", \"URICACID\"  No results found for: \"AMMONIA\"    Assessment:    Active Hospital Problems    Diagnosis Date Noted    Multiple myeloma not having achieved remission (HCC) [C90.00] 05/27/2024    Macrocytic anemia [D53.9] 05/26/2024    Lytic bone lesions on xray [M89.9] 05/25/2024    Palliative care encounter [Z51.5] 05/25/2024    Goals of care, counseling/discussion [Z71.89] 05/25/2024    Hypercalcemia [E83.52] 05/24/2024    Acute right-sided low back pain with sciatica [M54.40] 05/24/2024    Essential hypertension [I10] 10/22/2020   DEPRESSION        PLAN:     LYTIC BONE LESIONS  SPEP  UMRIPEP     HYPERCALCEMIA  NEPHROLOGY CONSULT  NS   CALCITONIN   PTH  IONIZED CALCIUM      HTN  MONITOR PRESSURE  HOLD HCTZ BC OF HYPERCALCEMIA     DEPRESSION/ANXIETY  VISTARIL    5/27/24:  -Ionized calcium his serum calcium about the same as yesterday, 1.49 and 11.2  -Continue IVF NS at 100  -Order for bone biopsy

## 2024-05-29 NOTE — PROGRESS NOTES
Physical Therapy  Physical Therapy Initial Assessment     Name: Derek Alvarado  : 1960  MRN: [id number]      Date of Service: 2024    Evaluating PT:  Griselda Cárdenas, PT, DPT, [id number]    Room #:  5417/5417-B  Diagnosis:  Hypercalcemia [E83.52]  PMHx/PSHx:    Past Medical History:   Diagnosis Date    Anxiety     No past surgical history on file.   Procedure/Surgery:  none this admission   Precautions:  Fall Risk, cognition, + alarms, Spinal Precautions ( compression fx T12, L1 &L4 - chronic, Compression deformities T11/L2/L3/L5), TLSO, R posterior 6th rib fx  Equipment Needs:  TBD    SUBJECTIVE:    Pt lives with his girlfriend in a mobile home with 3 steps and 1 rail to access. Bed is on 1 floor and bath is on 1 floor.  Pt ambulated with WW?? PTA. Pt reports that his girlfriend assists with all ADLs.     OBJECTIVE:   Initial Evaluation  Date: 24 Treatment Short Term/ Long Term   Goals   AM-PAC 6 Clicks      Was pt agreeable to Eval/treatment? yes     Does pt have pain? L LBP      Bed Mobility  Rolling: Deejay  Supine to sit: ModA  Sit to supine: ModA  Scooting: ModA  Rolling: Independent   Supine to sit: SBA   Sit to supine: SBA  Scooting: SBA   Transfers Sit to stand: ModA  Stand to sit: ModA  Stand pivot: ModA HHA  Sit to stand: SBA  Stand to sit: SBA  Stand pivot: SBA   Ambulation    A few steps to bedside chair with HHA ModA  50+ feet with AAD SBA   Stair negotiation: ascended and descended  NT  TBD   ROM BUE:  Refer to OT note  BLE:  WFL     Strength BUE:  Refer to OT note  BLE:  NT     Balance Sitting EOB:  SBA  Dynamic Standing:  ModA HHA  Sitting EOB:  Independent   Dynamic Standing:  SBA AAD     Pt is A & O x 3  Sensation:  Pt denies numbness and tingling to extremities  Edema:  unremarkable     Vitals:  HR and SPO2 were stable throughout session.     Patient education  Pt educated on role of PT, spinal precautions, and safety with functional mobility     Patient response to education:   Pt  joint and skin integrity, and interaction with environment.     Skilled monitoring of vitals throughout session.     Pt's/ family goals   1. Not stated    Prognosis is good for reaching above PT goals.    Patient and or family understand(s) diagnosis, prognosis, and plan of care.  yes    PHYSICAL THERAPY PLAN OF CARE:    PT POC is established based on physician order and patient diagnosis     Referring provider/PT Order:    05/27/24 1015  PT eval and treat  Start:  05/27/24 1015,   End:  05/27/24 1015,   ONE TIME,   Standing Count:  1 Occurrences,   R         Claire Ko MD     Diagnosis:  Hypercalcemia [E83.52]  Specific instructions for next treatment:  Maximize safe and independent functional mobility     Current Treatment Recommendations:     [x] Strengthening to improve independence with functional mobility   [] ROM to improve independence with functional mobility   [x] Balance Training to improve static/dynamic balance and to reduce fall risk  [x] Endurance Training to improve activity tolerance during functional mobility   [x] Transfer Training to improve safety and independence with all functional transfers   [x] Gait Training to improve gait mechanics, endurance and assess need for appropriate assistive device  [x] Stair Training in preparation for safe discharge home and/or into the community   [x] Positioning to prevent skin breakdown and contractures  [x] Safety and Education Training   [x] Patient/Caregiver Education   [] HEP  [x] Other     PT long term treatment goals are located in above grid    Frequency of treatments: 2-5x/week x 1-2 weeks.    Time in  1110  Time out  1133    Total Treatment Time  8 minutes     Evaluation Time includes thorough review of current medical information, gathering information on past medical history/social history and prior level of function, completion of standardized testing/informal observation of tasks, assessment of data and education on plan of care and  goals.    CPT codes:  [x] Low Complexity PT evaluation 44288  [] Moderate Complexity PT evaluation 90255  [] High Complexity PT evaluation 02363  [] PT Re-evaluation 33881  [] Gait training 82715 0 minutes  [] Manual therapy 06650 0 minutes  [x] Therapeutic activities 17134 8 minutes  [] Therapeutic exercises 65923 0 minutes  [] Neuromuscular reeducation 75991 0 minutes     Griselda Cárdenas PT, DPT  [id number]

## 2024-05-29 NOTE — PLAN OF CARE
Problem: Discharge Planning  Goal: Discharge to home or other facility with appropriate resources  Outcome: Progressing  Flowsheets (Taken 5/28/2024 1945)  Discharge to home or other facility with appropriate resources: Identify barriers to discharge with patient and caregiver     Problem: Pain  Goal: Verbalizes/displays adequate comfort level or baseline comfort level  Outcome: Progressing  Flowsheets (Taken 5/28/2024 1945)  Verbalizes/displays adequate comfort level or baseline comfort level: Encourage patient to monitor pain and request assistance     Problem: Safety - Adult  Goal: Free from fall injury  Outcome: Progressing  Flowsheets (Taken 5/28/2024 1945)  Free From Fall Injury: Instruct family/caregiver on patient safety     Problem: Skin/Tissue Integrity  Goal: Absence of new skin breakdown  Description: 1.  Monitor for areas of redness and/or skin breakdown  2.  Assess vascular access sites hourly  3.  Every 4-6 hours minimum:  Change oxygen saturation probe site  4.  Every 4-6 hours:  If on nasal continuous positive airway pressure, respiratory therapy assess nares and determine need for appliance change or resting period.  Outcome: Progressing     Problem: ABCDS Injury Assessment  Goal: Absence of physical injury  Outcome: Progressing  Flowsheets (Taken 5/28/2024 1945)  Absence of Physical Injury: Implement safety measures based on patient assessment

## 2024-05-29 NOTE — PROGRESS NOTES
Discussed patient and IR procedure with bedside RN, all questions answered. Will call when able to send for patient.

## 2024-05-29 NOTE — CARE COORDINATION
5/29/2024Social work transition of care planning  Sw received naveed choices from pt's family/roommate:Frantz,Maryam, and Kwabena [place]-referrals made. Sw will follow.  Electronically signed by SENDY Santo on 5/29/2024 at 3:29 PM

## 2024-05-29 NOTE — PLAN OF CARE
Problem: Discharge Planning  Goal: Discharge to home or other facility with appropriate resources  5/29/2024 1254 by Dary Navarro RN  Outcome: Progressing  5/29/2024 0131 by Bennie Decker RN  Outcome: Progressing  Flowsheets (Taken 5/28/2024 1945)  Discharge to home or other facility with appropriate resources: Identify barriers to discharge with patient and caregiver     Problem: Pain  Goal: Verbalizes/displays adequate comfort level or baseline comfort level  5/29/2024 1254 by Dary Navarro RN  Outcome: Progressing  5/29/2024 0131 by Bennie Decker RN  Outcome: Progressing  Flowsheets (Taken 5/28/2024 1945)  Verbalizes/displays adequate comfort level or baseline comfort level: Encourage patient to monitor pain and request assistance     Problem: Safety - Adult  Goal: Free from fall injury  5/29/2024 1254 by Dary Navarro RN  Outcome: Progressing  5/29/2024 0131 by Bennie Decker RN  Outcome: Progressing  Flowsheets (Taken 5/28/2024 1945)  Free From Fall Injury: Instruct family/caregiver on patient safety     Problem: Skin/Tissue Integrity  Goal: Absence of new skin breakdown  Description: 1.  Monitor for areas of redness and/or skin breakdown  2.  Assess vascular access sites hourly  3.  Every 4-6 hours minimum:  Change oxygen saturation probe site  4.  Every 4-6 hours:  If on nasal continuous positive airway pressure, respiratory therapy assess nares and determine need for appliance change or resting period.  5/29/2024 1254 by Dary Navarro RN  Outcome: Progressing  5/29/2024 0131 by Bennie Decker RN  Outcome: Progressing     Problem: ABCDS Injury Assessment  Goal: Absence of physical injury  5/29/2024 1254 by Dary Navarro RN  Outcome: Progressing  5/29/2024 0131 by Bennie Decker RN  Outcome: Progressing  Flowsheets (Taken 5/28/2024 1945)  Absence of Physical Injury: Implement safety measures based on patient assessment

## 2024-05-29 NOTE — BRIEF OP NOTE
Brief Postoperative Note      Patient: Derek Alvarado  YOB: 1960  MRN: [id number]    Date of Procedure: 5/29/2024    Anemia    Post-Op Diagnosis: Same       Bone marrow biopsy and aspiration    LINK Gardner    Assistant:  * No surgical staff found *    Anesthesia: * No anesthesia type entered *    Estimated Blood Loss (mL): Minimal    Complications: None    Specimens:   Bone marrow biopsy    Implants:  * No implants in log *      Drains: * No LDAs found *    Findings:  Infection Present At Time Of Surgery (PATOS) (choose all levels that have infection present):  No infection present  Other Findings: Bone marrow biopsy and aspiration    Electronically signed by ELANA Gardner MD on 5/29/2024 at 3:03 PM

## 2024-05-29 NOTE — PROGRESS NOTES
Patient returned from procedure. Dressing checked, clean, dry, and intact. Patient stable. No s/s of complications noted or reported. Vitals will be checked q 15min, see flow sheets.  Report called and given to charge RN. Floor nurse to come down to  pt.

## 2024-05-29 NOTE — PROGRESS NOTES
[facility]    Inpatient Medical Oncology/Hematology follow-up:      Patient Name: Derek Alvarado  YOB: 1960    DATE OF ADMISSION: 5/24/2024  DATE OF CONSULTATION: 5/25/2024  CONSULTING PROVIDER: Adelaida Talley MD  REASON FOR CONSULTATION: \"Anemia, hypercalcemia, rule out multiple myeloma\"  PCP: John Nuno    Room: [address] [place] [address]      CHIEF COMPLAINT:  Back pain      Subjective:  Bone marrow biopsy today. Patient denies any pain or major complaints today.     HPI from Initial Inpatient Consultation  (5/25/2024):   Patient is a 64 y.o. male comes in for back pain. He initially presented to [facility] with these symptoms that started in recent days. Also complains of right lower extremity cramping with some degree of weakness. He was somewhat hard of hearing. Patient c/o right arm tingling and some weakness.  Apart from his lower back he does not have significant pain else where including the neck.    Upon admission, labs found his Hgb was 8.1 with Hgb; calcium was 12.2. Creatinine was 1.6.         ROS: Negative except as noted above.         Current Facility-Administered Medications   Medication Dose Route Frequency Provider Last Rate Last Admin    0.9 % sodium chloride infusion   IntraVENous PRN Itzel Hoskins APRN - CNP        acetaminophen (TYLENOL) 160 MG/5ML solution 650 mg  650 mg Oral See Admin Instructions Itzel Hoskins APRN - CNP        diphenhydrAMINE (BENADRYL) injection 25 mg  25 mg IntraVENous See Admin Instructions Itzel Hoskins APRN - CNP        sennosides-docusate sodium (SENOKOT-S) 8.6-50 MG tablet 1 tablet  1 tablet Oral Daily Robyn Piper APRN - CNP        magnesium oxide (MAG-OX) tablet 400 mg  400 mg Oral Daily Adelaida Talley MD        lactated ringers IV soln infusion   IntraVENous Continuous Adelaida Talley MD 75 mL/hr at 05/29/24 0407 New Bag at 05/29/24 0407    oxyCODONE (ROXICODONE) immediate release tablet 5 mg  5 mg Oral

## 2024-05-29 NOTE — PROGRESS NOTES
visited Mr. Alvarado as a palliative care consult.  Upon entering the room I found Derek lying in bed and alert but hard of hearing.  He told me about his back pain and expressed being in a lot of pain.  He talked to me about the possibility of having cancer and how a test today should tell him whether he has cancer or not.  We talked about his family and he told me about his parents, sibling and girlfriend. He expressed being down emotionally and I offered words of encouragement by pointing to his family as a source  of hope.  I asked him about his Caodaism and spiritual beliefs and he said he would describe himself as a Methodist.  I offered to pray and prayed for his health and family.  Derek expressed gratitude.     If additional support is requested or needed please reach out to Spiritual Health ([phone]).    Kathy Burger MDIV, [facility]       05/29/24 7455   Encounter Summary   Encounter Overview/Reason Initial Encounter;Spiritual/Emotional Needs;Palliative Care   Service Provided For Patient   Referral/Consult From Rounding;Palliative Care   Support System Significant other;Parent;Family members   Last Encounter  05/29/24  (p-DS)   Complexity of Encounter Moderate   Spiritual/Emotional needs   Type Spiritual Support   Palliative Care   Type Palliative Care, Initial/Spiritual Assessment   Assessment/Intervention/Outcome   Assessment Calm   Intervention Active listening;Discussed relationship with God;Explored/Affirmed feelings, thoughts, concerns;Discussed illness injury and it’s impact;Nurtured Hope;Discussed belief system/Caodaism practices/pratik;Prayer (assurance of)/[place]   Outcome Coping;Expressed feelings, needs, and concerns;Comfort;Expressed Gratitude;Encouraged;Engaged in conversation;Receptive   Plan and Referrals   Plan/Referrals Provided reading/devotional materials

## 2024-05-30 LAB
ABO/RH: NORMAL
ALBUMIN SERPL-MCNC: 3.2 G/DL (ref 3.5–5.2)
ALP SERPL-CCNC: 54 U/L (ref 40–129)
ALT SERPL-CCNC: 5 U/L (ref 0–40)
ANION GAP SERPL CALCULATED.3IONS-SCNC: 19 MMOL/L (ref 7–16)
ANTIBODY SCREEN: NEGATIVE
ARM BAND NUMBER: NORMAL
AST SERPL-CCNC: 13 U/L (ref 0–39)
BASOPHILS # BLD: 0 K/UL (ref 0–0.2)
BASOPHILS NFR BLD: 0 % (ref 0–2)
BILIRUB SERPL-MCNC: 1.3 MG/DL (ref 0–1.2)
BLOOD BANK BLOOD PRODUCT EXPIRATION DATE: NORMAL
BLOOD BANK DISPENSE STATUS: NORMAL
BLOOD BANK ISBT PRODUCT BLOOD TYPE: 1700
BLOOD BANK PRODUCT CODE: NORMAL
BLOOD BANK SAMPLE EXPIRATION: NORMAL
BLOOD BANK UNIT TYPE AND RH: NORMAL
BPU ID: NORMAL
BUN SERPL-MCNC: 18 MG/DL (ref 6–23)
CA-I BLD-SCNC: 1.34 MMOL/L (ref 1.15–1.33)
CALCIUM SERPL-MCNC: 10.6 MG/DL (ref 8.6–10.2)
CHLORIDE SERPL-SCNC: 99 MMOL/L (ref 98–107)
CO2 SERPL-SCNC: 16 MMOL/L (ref 22–29)
COMPONENT: NORMAL
CREAT SERPL-MCNC: 1.3 MG/DL (ref 0.7–1.2)
CROSSMATCH RESULT: NORMAL
EOSINOPHIL # BLD: 0.03 K/UL (ref 0.05–0.5)
EOSINOPHILS RELATIVE PERCENT: 1 % (ref 0–6)
ERYTHROCYTE [DISTWIDTH] IN BLOOD BY AUTOMATED COUNT: 15.9 % (ref 11.5–15)
GFR, ESTIMATED: 63 ML/MIN/1.73M2
GLUCOSE SERPL-MCNC: 126 MG/DL (ref 74–99)
HCT VFR BLD AUTO: 25.3 % (ref 37–54)
HGB BLD-MCNC: 8.6 G/DL (ref 12.5–16.5)
LYMPHOCYTES NFR BLD: 0.42 K/UL (ref 1.5–4)
LYMPHOCYTES RELATIVE PERCENT: 11 % (ref 20–42)
MAGNESIUM SERPL-MCNC: 1.6 MG/DL (ref 1.6–2.6)
MCH RBC QN AUTO: 33.3 PG (ref 26–35)
MCHC RBC AUTO-ENTMCNC: 34 G/DL (ref 32–34.5)
MCV RBC AUTO: 98.1 FL (ref 80–99.9)
METAMYELOCYTES ABSOLUTE COUNT: 0.06 K/UL (ref 0–0.12)
METAMYELOCYTES: 2 % (ref 0–1)
MONOCYTES NFR BLD: 0.1 K/UL (ref 0.1–0.95)
MONOCYTES NFR BLD: 3 % (ref 2–12)
MYELOCYTES ABSOLUTE COUNT: 0.03 K/UL
MYELOCYTES: 1 %
NEUTROPHILS NFR BLD: 83 % (ref 43–80)
NEUTS SEG NFR BLD: 3.06 K/UL (ref 1.8–7.3)
NUCLEATED RED BLOOD CELLS: 1 PER 100 WBC
PLATELET # BLD AUTO: 151 K/UL (ref 130–450)
PMV BLD AUTO: 9.1 FL (ref 7–12)
POTASSIUM SERPL-SCNC: 3.8 MMOL/L (ref 3.5–5)
PROT SERPL-MCNC: 8.2 G/DL (ref 6.4–8.3)
RBC # BLD AUTO: 2.58 M/UL (ref 3.8–5.8)
RBC # BLD: ABNORMAL 10*6/UL
SODIUM SERPL-SCNC: 134 MMOL/L (ref 132–146)
TRANSFUSION STATUS: NORMAL
UNIT DIVISION: 0
UNIT ISSUE DATE/TIME: NORMAL
WBC OTHER # BLD: 3.7 K/UL (ref 4.5–11.5)

## 2024-05-30 PROCEDURE — 99231 SBSQ HOSP IP/OBS SF/LOW 25: CPT | Performed by: CLINICAL NURSE SPECIALIST

## 2024-05-30 PROCEDURE — 6360000002 HC RX W HCPCS: Performed by: INTERNAL MEDICINE

## 2024-05-30 PROCEDURE — 6370000000 HC RX 637 (ALT 250 FOR IP): Performed by: INTERNAL MEDICINE

## 2024-05-30 PROCEDURE — 85025 COMPLETE CBC W/AUTO DIFF WBC: CPT

## 2024-05-30 PROCEDURE — 82330 ASSAY OF CALCIUM: CPT

## 2024-05-30 PROCEDURE — 36415 COLL VENOUS BLD VENIPUNCTURE: CPT

## 2024-05-30 PROCEDURE — 6370000000 HC RX 637 (ALT 250 FOR IP): Performed by: NURSE PRACTITIONER

## 2024-05-30 PROCEDURE — 83735 ASSAY OF MAGNESIUM: CPT

## 2024-05-30 PROCEDURE — 80053 COMPREHEN METABOLIC PANEL: CPT

## 2024-05-30 PROCEDURE — 1200000000 HC SEMI PRIVATE

## 2024-05-30 PROCEDURE — 2580000003 HC RX 258: Performed by: INTERNAL MEDICINE

## 2024-05-30 PROCEDURE — 6370000000 HC RX 637 (ALT 250 FOR IP): Performed by: STUDENT IN AN ORGANIZED HEALTH CARE EDUCATION/TRAINING PROGRAM

## 2024-05-30 PROCEDURE — 6360000002 HC RX W HCPCS: Performed by: NURSE PRACTITIONER

## 2024-05-30 RX ORDER — MAGNESIUM SULFATE IN WATER 40 MG/ML
2000 INJECTION, SOLUTION INTRAVENOUS ONCE
Status: COMPLETED | OUTPATIENT
Start: 2024-05-30 | End: 2024-05-30

## 2024-05-30 RX ADMIN — HYDROXYZINE PAMOATE 25 MG: 25 CAPSULE ORAL at 17:42

## 2024-05-30 RX ADMIN — FOLIC ACID 1 MG: 1 TABLET ORAL at 09:13

## 2024-05-30 RX ADMIN — MAGNESIUM SULFATE HEPTAHYDRATE 2000 MG: 40 INJECTION, SOLUTION INTRAVENOUS at 14:52

## 2024-05-30 RX ADMIN — DEXAMETHASONE 40 MG: 4 TABLET ORAL at 09:14

## 2024-05-30 RX ADMIN — SODIUM CHLORIDE, PRESERVATIVE FREE 10 ML: 5 INJECTION INTRAVENOUS at 09:18

## 2024-05-30 RX ADMIN — SENNOSIDES AND DOCUSATE SODIUM 1 TABLET: 50; 8.6 TABLET ORAL at 09:13

## 2024-05-30 ASSESSMENT — PAIN SCALES - GENERAL: PAINLEVEL_OUTOF10: 0

## 2024-05-30 ASSESSMENT — PAIN SCALES - WONG BAKER: WONGBAKER_NUMERICALRESPONSE: NO HURT

## 2024-05-30 NOTE — PROGRESS NOTES
Palliative Care Department  [phone]  Palliative Care Progress Note  Provider Basia Reynoso APRN-CNS    Derek Alvarado  [id number]  Hospital Day: 6  Date of Initial Consult: 5/25/2024  Referring Provider: Dave Martinez MD  Palliative Medicine was consulted for assistance with: Overwhelming symptoms    HPI:   Derek Alvarado is a 64 y.o. with a medical history of anxiety who was admitted on 5/24/2024 from home with a CHIEF COMPLAINT of low back pain.  Patient is very hard of hearing and a poor historian.  States for the past 3 weeks he has been having severe lower back pain and difficulty walking.  MRI showing severe subacute compression fracture of T12 with up to 80% height loss centrally.  Severe chronic compression fractures of L1.  Moderate chronic compression fracture of L4.  Mild chronic compression deformities of T11, L2, L3 and L5.  Erosive changes and associated abnormal enhancement, most pronounced in the left sacrum and right iliac wing, suspicious for bony metastasis.  Patient was admitted for further medical management.    ASSESSMENT/PLAN:     Pertinent Hospital Diagnoses   Lytic bone lesions  Hypercalcemia  RAMONA on CKD    Palliative Care Encounter / Counseling Regarding Goals of Care  Please see detailed goals of care discussion as below  At this time, Derek Alvarado, Does have capacity for medical decision-making.  Capacity is time limited and situation/question specific  During encounter there was no surrogate medical decision-maker needed  Outcome of goals of care meeting:   Symptom management  Full code  Awaiting bone biopsy results  Considering CAIN at [place]  Wanting more fitted brace  Code status Full Code- continue  Advanced Directives: no POA or living will in [facility]  Surrogate/Legal NOK:  Michael Alvarado (parent) [phone]  Krista Damian (girlfriend) [phone]    Pain related to neoplasm:   -Continue Dilaudid 0.25 mg IV every 4 hours as needed pain - took 1 dose in 24 hrs  (Breakthrough pain

## 2024-05-30 NOTE — CARE COORDINATION
5/30/2024Social work transition of care planning  Ashutosh notified that [facility] can accept and will initiate precert today. Sw completed 7000 and envelope. Pt updated at bedside and is agreeable.  Electronically signed by SENDY Santo on 5/30/2024 at 1:06 PM      Addendum: Ashutosh set up will [facility] with [facility] [phone].  Electronically signed by SENDY Santo on 5/30/2024 at 3:09 PM

## 2024-05-30 NOTE — PROGRESS NOTES
Department of Internal Medicine  Nephrology Attending Progress Note    Events reviewed   Had a bone marrow biopsy done yesterday    SUBJECTIVE: we are following Mr Alvarado for hypercalcemia and elevated creatinine.  Reports no new complaints.    PHYSICAL EXAM:      Vitals:    VITALS:  BP (!) 140/92   Pulse 99   Temp 98.1 °F (36.7 °C) (Temporal)   Resp 20   Wt 68 kg (150 lb)   SpO2 95%   BMI 22.15 kg/m²   24HR INTAKE/OUTPUT:    Intake/Output Summary (Last 24 hours) at 5/30/2024 1217  Last data filed at 5/29/2024 2301  Gross per 24 hour   Intake 566 ml   Output 1050 ml   Net -484 ml         Constitutional: Patient is awake, alert, in no distress  HEENT: Pupils equal reactive  Respiratory: Lungs are clear  Cardiovascular/Edema: Heart sounds are regular  Gastrointestinal: Abdomen soft  Neurologic: No focal  Skin: No lesions  Other: No edema    Scheduled Meds:   acetaminophen  650 mg Oral See Admin Instructions    diphenhydrAMINE  25 mg IntraVENous See Admin Instructions    sennosides-docusate sodium  1 tablet Oral Daily    dexAMETHasone  40 mg Oral Daily    sodium chloride flush  5-40 mL IntraVENous BID    magnesium oxide  400 mg Oral Daily    pamidronate (AREDIA) 30 mg in sodium chloride 0.9 % 500 mL infusion  30 mg IntraVENous Once    folic acid  1 mg Oral Daily    [Held by provider] enoxaparin  40 mg SubCUTAneous Daily     Continuous Infusions:   sodium chloride      lactated ringers IV soln 75 mL/hr at 05/29/24 2047     PRN Meds:.sodium chloride, sodium chloride flush, oxyCODONE, diphenhydrAMINE-zinc acetate, HYDROmorphone, hydrALAZINE, cyclobenzaprine, sennosides-docusate sodium, ondansetron **OR** ondansetron, hydrOXYzine pamoate      DATA:    CBC:   Lab Results   Component Value Date/Time    WBC 3.7 05/30/2024 04:55 AM    RBC 2.58 05/30/2024 04:55 AM    HGB 8.6 05/30/2024 04:55 AM    HCT 25.3 05/30/2024 04:55 AM    MCV 98.1 05/30/2024 04:55 AM    MCH 33.3 05/30/2024 04:55 AM    MCHC 34.0 05/30/2024 04:55 AM     RDW 15.9 05/30/2024 04:55 AM     05/30/2024 04:55 AM    MPV 9.1 05/30/2024 04:55 AM     CMP:    Lab Results   Component Value Date/Time     05/30/2024 04:55 AM    K 3.8 05/30/2024 04:55 AM    CL 99 05/30/2024 04:55 AM    CO2 16 05/30/2024 04:55 AM    BUN 18 05/30/2024 04:55 AM    CREATININE 1.3 05/30/2024 04:55 AM    GFRAA >60 07/27/2021 07:55 AM    LABGLOM 63 05/30/2024 04:55 AM    GLUCOSE 126 05/30/2024 04:55 AM    CALCIUM 10.6 05/30/2024 04:55 AM    BILITOT 1.3 05/30/2024 04:55 AM    ALKPHOS 54 05/30/2024 04:55 AM    AST 13 05/30/2024 04:55 AM    ALT 5 05/30/2024 04:55 AM     Magnesium:    Lab Results   Component Value Date/Time    MG 1.6 05/30/2024 04:55 AM     Phosphorus:    Lab Results   Component Value Date/Time    PHOS 3.1 05/25/2024 07:27 AM       Radiology Review:      XR BONE SURVEY COMPLETE  5/24/24    IMPRESSION:  1. Ill-defined permeative lytic changes are seen involving the diaphysis of  the calvarium, humerus, scapula, pelvic bones and femur. Lytic focus in the  distal half of the right humerus measures up to 1.8 cm in size.  These  findings are suspicious for multiple myeloma or diffuse metastasis.  2. Minimally displaced fracture of the right posterior 6th rib.  Multiple  osteoporotic insufficiency fractures are seen in the thoracolumbar spine.    BRIEF SUMMARY OF INITIAL CONSULT:    Briefly Mr. Alvarado is a 64-year-old male with history of HTN, nephrolithiasis, hyperlipidemia, hard of hearing, who was admitted on May 24, 2024 transferred from [facility], where he initially presented with altered mental status back pain and was found to have hypercalcemia with calcium >14, he has well had a CT scan of the spine which showed multiple lytic lesions with compression fracture reason for his transferring.  On admission he was found to have a sodium level of 131, creatinine 1.6 mg/dL and calcium level 12.2 mg/dL, reasons for this consultation.  Prior to admission his

## 2024-05-30 NOTE — PLAN OF CARE
Problem: Discharge Planning  Goal: Discharge to home or other facility with appropriate resources  5/30/2024 0216 by Slade Hope RN  Outcome: Progressing  5/29/2024 1254 by Dary Navarro RN  Outcome: Progressing     Problem: Pain  Goal: Verbalizes/displays adequate comfort level or baseline comfort level  5/30/2024 0216 by Slade Hope RN  Outcome: Not Progressing  5/29/2024 1254 by Dary Navarro RN  Outcome: Progressing     Problem: Safety - Adult  Goal: Free from fall injury  5/30/2024 0216 by Slade Hope RN  Outcome: Progressing  5/29/2024 1254 by Dary Navarro RN  Outcome: Progressing     Problem: Skin/Tissue Integrity  Goal: Absence of new skin breakdown  Description: 1.  Monitor for areas of redness and/or skin breakdown  2.  Assess vascular access sites hourly  3.  Every 4-6 hours minimum:  Change oxygen saturation probe site  4.  Every 4-6 hours:  If on nasal continuous positive airway pressure, respiratory therapy assess nares and determine need for appliance change or resting period.  5/30/2024 0216 by Slade Hope RN  Outcome: Progressing  5/29/2024 1254 by Dary Navarro RN  Outcome: Progressing     Problem: ABCDS Injury Assessment  Goal: Absence of physical injury  5/30/2024 0216 by Slade Hope RN  Outcome: Progressing  5/29/2024 1254 by Dary Navarro RN  Outcome: Progressing     Problem: Neurosensory - Adult  Goal: Achieves maximal functionality and self care  Outcome: Not Progressing     Problem: Respiratory - Adult  Goal: Achieves optimal ventilation and oxygenation  Outcome: Not Progressing     Problem: Skin/Tissue Integrity - Adult  Goal: Skin integrity remains intact  Outcome: Progressing     Problem: Musculoskeletal - Adult  Goal: Return mobility to safest level of function  Outcome: Not Progressing  Goal: Maintain proper alignment of affected body part  Outcome: Progressing  Goal: Return ADL status to a safe level of function  Outcome: Not

## 2024-05-30 NOTE — PROGRESS NOTES
Patient noted to have had a blood transfusion earlier in the day. Prior shift never followed up with post transfusion H+H. Placing order. Will follow.

## 2024-05-30 NOTE — DISCHARGE INSTR - COC
Continuity of Care Form    Patient Name: Derek Buckner   :  1960  MRN:  [id number]    Admit date:  2024  Discharge date:  ***    Code Status Order: Full Code   Advance Directives:   Advance Care Flowsheet Documentation       Date/Time Healthcare Directive Type of Healthcare Directive Copy in Chart Healthcare Agent Appointed Healthcare Agent's Name Healthcare Agent's Phone Number    [phone] No, patient does not have an advance directive for healthcare treatment -- -- -- -- --            Admitting Physician:  Claire Ko MD  PCP: John Nuno APRN - CNP    Discharging Nurse: ***  Discharging Hospital Unit/Room#: 5417/5417-B  Discharging Unit Phone Number: ***    Emergency Contact:   Extended Emergency Contact Information  Primary Emergency Contact: shanti graham  Address: [address] [place] [address] [address]           [place] [address] [facility]  Home Phone: [phone]  Mobile Phone: [phone]  Relation: Other  Secondary Emergency Contact: dacia buckner  Address: [facility] [address]  Home Phone: [phone]  Relation: Parent    Past Surgical History:  No past surgical history on file.    Immunization History:   Immunization History   Administered Date(s) Administered    Influenza, FLUARIX, FLULAVAL, FLUZONE (age 6 mo+) AND AFLURIA, (age 3 y+), PF, 0.5mL 10/25/2018    Td, unspecified formulation 2014       Active Problems:  Patient Active Problem List   Diagnosis Code    Anxiety F41.9    Essential hypertension I10    Hypercalcemia E83.52    Acute right-sided low back pain with sciatica M54.40    Lytic bone lesions on xray M89.9    Palliative care encounter Z51.5    Goals of care, counseling/discussion Z71.89    Macrocytic anemia D53.9    Multiple myeloma not having achieved remission (HCC) C90.00       Isolation/Infection:   Isolation            No Isolation          Patient Infection Status       None to display            Nurse

## 2024-05-30 NOTE — PROGRESS NOTES
Hospitalist Progress Note      PCP: John Nuno APRN - CNP    Date of Admission: 5/24/2024        Hospital Course:  64 y.o. male presented with LOW BACK PAIN.  HE IS VERY [place] AND A POOR HISTORIAN.  HE STATES FOR THE PAST 3 WEEKS HE HAS HAD SEVERE LBP AND DIFFICULTY WALKING.  HIS   GIRLFRIEND WAS RUBBING HIS LOWER BACK WITH A PAIN CREAM AND IT DID NOT WORK. HE DENIES FALLING       5/26 FOR TSLO BRACE    Subjective:     Resting soundly on assessment      Medications:  Reviewed    Infusion Medications    sodium chloride      lactated ringers IV soln 75 mL/hr at 05/29/24 2047     Scheduled Medications    acetaminophen  650 mg Oral See Admin Instructions    diphenhydrAMINE  25 mg IntraVENous See Admin Instructions    sennosides-docusate sodium  1 tablet Oral Daily    dexAMETHasone  40 mg Oral Daily    sodium chloride flush  5-40 mL IntraVENous BID    pamidronate (AREDIA) 30 mg in sodium chloride 0.9 % 500 mL infusion  30 mg IntraVENous Once    folic acid  1 mg Oral Daily    [Held by provider] enoxaparin  40 mg SubCUTAneous Daily     PRN Meds: sodium chloride, sodium chloride flush, oxyCODONE, diphenhydrAMINE-zinc acetate, HYDROmorphone, hydrALAZINE, cyclobenzaprine, sennosides-docusate sodium, ondansetron **OR** ondansetron, hydrOXYzine pamoate      Intake/Output Summary (Last 24 hours) at 5/30/2024 1652  Last data filed at 5/30/2024 1300  Gross per 24 hour   Intake 490 ml   Output 650 ml   Net -160 ml         Exam:    BP (!) 140/92   Pulse 99   Temp 98.1 °F (36.7 °C) (Temporal)   Resp 20   Wt 68 kg (150 lb)   SpO2 95%   BMI 22.15 kg/m²         eneral appearance:  No apparent distress, appears stated age.  HEENT:  Normal cephalic, [place]  Neck: Supple, with full range of motion. No jugular venous distention. Trachea midline.  Respiratory:  Normal respiratory effort. Clear to auscultation,   Cardiovascular:  RRR  Abdomen: Soft, non-tender, non-distended with normal bowel sounds.  Musculoskeletal:  No  biopsy per heme-onc recommendations  -Await timing  -K+ 3.2, defer to nephro  -Mg+ 1.5, defer replacement to nephro  -Continue to monitor H&H, 7.0/21.4  -BPs remain mildly elevated    5/28/24:  -Improving kidney function, 19/1.5  -Ca+ 9.3, ionized calcium 1.25  -Remains on IVF however switched to LR at 75 per nephrology  -Plans for bone marrow biopsy tomorrow  -Hold Lovenox, n.p.o. at midnight  -Diet changed to soft and bite-size due to difficulty chewing per patient    5/29/24:  -Bone marrow biopsy today   -H/H 6.9/20.6,defer to hem/onc  -K+ 3.2, per nephro  -Remains on IVF LR at 75  -improving creatinine 1.3  -Await bone marrow biopsy and further recs     5/30/24:  -Status post bone marrow biopsy  -Improvement in H&H to 8.6/25.3 after unit of RBC  -Accepted to [facility] for rehab on discharge  -Mild bump in calcium today 10.6 with ionized at 1.34  -Remains on IVF LR at 75 per nephro  -Await bone marrow biopsy results  -Discharged to [facility] when okay with others    DVT prophylaxis: PCD's  Diet: ADULT DIET; Regular  Code Status: Full Code     PT/OT Eval Status:  ORDERED     Dispo -  HOME  Electronically signed by MALACHI Walker CNP on 5/30/2024 at 4:52 PM

## 2024-05-31 LAB
ALBUMIN SERPL-MCNC: 3.1 G/DL (ref 3.5–5.2)
ALP SERPL-CCNC: 50 U/L (ref 40–129)
ALT SERPL-CCNC: <5 U/L (ref 0–40)
ANION GAP SERPL CALCULATED.3IONS-SCNC: 13 MMOL/L (ref 7–16)
AST SERPL-CCNC: 10 U/L (ref 0–39)
ATYPICAL LYMPHOCYTE ABSOLUTE COUNT: 0.05 K/UL (ref 0–0.46)
ATYPICAL LYMPHOCYTES: 1 % (ref 0–4)
BASOPHILS # BLD: 0 K/UL (ref 0–0.2)
BASOPHILS NFR BLD: 0 % (ref 0–2)
BILIRUB SERPL-MCNC: 0.8 MG/DL (ref 0–1.2)
BUN SERPL-MCNC: 30 MG/DL (ref 6–23)
CA-I BLD-SCNC: 1.25 MMOL/L (ref 1.15–1.33)
CALCIUM SERPL-MCNC: 9.7 MG/DL (ref 8.6–10.2)
CHLORIDE SERPL-SCNC: 100 MMOL/L (ref 98–107)
CO2 SERPL-SCNC: 20 MMOL/L (ref 22–29)
CREAT SERPL-MCNC: 1.3 MG/DL (ref 0.7–1.2)
EOSINOPHIL # BLD: 0 K/UL (ref 0.05–0.5)
EOSINOPHILS RELATIVE PERCENT: 0 % (ref 0–6)
ERYTHROCYTE [DISTWIDTH] IN BLOOD BY AUTOMATED COUNT: 15.6 % (ref 11.5–15)
GFR, ESTIMATED: 61 ML/MIN/1.73M2
GLUCOSE SERPL-MCNC: 140 MG/DL (ref 74–99)
HCT VFR BLD AUTO: 22.7 % (ref 37–54)
HGB BLD-MCNC: 8.1 G/DL (ref 12.5–16.5)
LYMPHOCYTES NFR BLD: 0.36 K/UL (ref 1.5–4)
LYMPHOCYTES RELATIVE PERCENT: 6 % (ref 20–42)
MAGNESIUM SERPL-MCNC: 2.1 MG/DL (ref 1.6–2.6)
MCH RBC QN AUTO: 33.8 PG (ref 26–35)
MCHC RBC AUTO-ENTMCNC: 35.7 G/DL (ref 32–34.5)
MCV RBC AUTO: 94.6 FL (ref 80–99.9)
METAMYELOCYTES ABSOLUTE COUNT: 0.05 K/UL (ref 0–0.12)
METAMYELOCYTES: 1 % (ref 0–1)
MONOCYTES NFR BLD: 0.31 K/UL (ref 0.1–0.95)
MONOCYTES NFR BLD: 5 % (ref 2–12)
NEUTROPHILS NFR BLD: 87 % (ref 43–80)
NEUTS SEG NFR BLD: 5.14 K/UL (ref 1.8–7.3)
NUCLEATED RED BLOOD CELLS: 1 PER 100 WBC
PLATELET # BLD AUTO: 160 K/UL (ref 130–450)
PMV BLD AUTO: 9.5 FL (ref 7–12)
POTASSIUM SERPL-SCNC: 3.5 MMOL/L (ref 3.5–5)
PROT SERPL-MCNC: 7.6 G/DL (ref 6.4–8.3)
RBC # BLD AUTO: 2.4 M/UL (ref 3.8–5.8)
RBC # BLD: ABNORMAL 10*6/UL
SODIUM SERPL-SCNC: 133 MMOL/L (ref 132–146)
WBC OTHER # BLD: 5.9 K/UL (ref 4.5–11.5)

## 2024-05-31 PROCEDURE — 97535 SELF CARE MNGMENT TRAINING: CPT

## 2024-05-31 PROCEDURE — 6360000002 HC RX W HCPCS: Performed by: INTERNAL MEDICINE

## 2024-05-31 PROCEDURE — 2580000003 HC RX 258: Performed by: INTERNAL MEDICINE

## 2024-05-31 PROCEDURE — 36415 COLL VENOUS BLD VENIPUNCTURE: CPT

## 2024-05-31 PROCEDURE — 1200000000 HC SEMI PRIVATE

## 2024-05-31 PROCEDURE — 97530 THERAPEUTIC ACTIVITIES: CPT

## 2024-05-31 PROCEDURE — 6370000000 HC RX 637 (ALT 250 FOR IP)

## 2024-05-31 PROCEDURE — 6360000002 HC RX W HCPCS: Performed by: STUDENT IN AN ORGANIZED HEALTH CARE EDUCATION/TRAINING PROGRAM

## 2024-05-31 PROCEDURE — 6370000000 HC RX 637 (ALT 250 FOR IP): Performed by: NURSE PRACTITIONER

## 2024-05-31 PROCEDURE — 83735 ASSAY OF MAGNESIUM: CPT

## 2024-05-31 PROCEDURE — 85025 COMPLETE CBC W/AUTO DIFF WBC: CPT

## 2024-05-31 PROCEDURE — 99231 SBSQ HOSP IP/OBS SF/LOW 25: CPT

## 2024-05-31 PROCEDURE — 99232 SBSQ HOSP IP/OBS MODERATE 35: CPT | Performed by: INTERNAL MEDICINE

## 2024-05-31 PROCEDURE — 80053 COMPREHEN METABOLIC PANEL: CPT

## 2024-05-31 PROCEDURE — 82330 ASSAY OF CALCIUM: CPT

## 2024-05-31 RX ORDER — HYDROXYZINE HYDROCHLORIDE 50 MG/ML
25 INJECTION, SOLUTION INTRAMUSCULAR EVERY 6 HOURS PRN
Status: DISCONTINUED | OUTPATIENT
Start: 2024-05-31 | End: 2024-05-31 | Stop reason: SDUPTHER

## 2024-05-31 RX ORDER — SENNA AND DOCUSATE SODIUM 50; 8.6 MG/1; MG/1
1 TABLET, FILM COATED ORAL DAILY
Status: ON HOLD | DISCHARGE
Start: 2024-06-01 | End: 2024-06-10 | Stop reason: HOSPADM

## 2024-05-31 RX ORDER — HYDROXYZINE HYDROCHLORIDE 50 MG/ML
25 INJECTION, SOLUTION INTRAMUSCULAR EVERY 6 HOURS PRN
Status: DISCONTINUED | OUTPATIENT
Start: 2024-05-31 | End: 2024-06-01 | Stop reason: HOSPADM

## 2024-05-31 RX ORDER — FOLIC ACID 1 MG/1
1 TABLET ORAL DAILY
Qty: 30 TABLET | Refills: 3 | Status: ON HOLD | DISCHARGE
Start: 2024-06-01 | End: 2024-06-10 | Stop reason: HOSPADM

## 2024-05-31 RX ORDER — POTASSIUM CHLORIDE 29.8 MG/ML
20 INJECTION INTRAVENOUS ONCE
Status: COMPLETED | OUTPATIENT
Start: 2024-05-31 | End: 2024-05-31

## 2024-05-31 RX ORDER — OXYCODONE HYDROCHLORIDE 5 MG/1
5 TABLET ORAL EVERY 6 HOURS PRN
Qty: 12 TABLET | Refills: 0 | Status: SHIPPED | OUTPATIENT
Start: 2024-05-31 | End: 2024-06-03

## 2024-05-31 RX ORDER — CYCLOBENZAPRINE HCL 10 MG
10 TABLET ORAL 3 TIMES DAILY PRN
Status: ON HOLD | DISCHARGE
Start: 2024-05-31 | End: 2024-06-10 | Stop reason: HOSPADM

## 2024-05-31 RX ORDER — POLYETHYLENE GLYCOL 3350 17 G/17G
17 POWDER, FOR SOLUTION ORAL DAILY
Status: DISCONTINUED | OUTPATIENT
Start: 2024-05-31 | End: 2024-06-01 | Stop reason: HOSPADM

## 2024-05-31 RX ADMIN — HYDROMORPHONE HYDROCHLORIDE 0.25 MG: 1 INJECTION, SOLUTION INTRAMUSCULAR; INTRAVENOUS; SUBCUTANEOUS at 11:49

## 2024-05-31 RX ADMIN — OXYCODONE 5 MG: 5 TABLET ORAL at 20:04

## 2024-05-31 RX ADMIN — POTASSIUM CHLORIDE 20 MEQ: 29.8 INJECTION, SOLUTION INTRAVENOUS at 11:38

## 2024-05-31 RX ADMIN — SODIUM CHLORIDE, PRESERVATIVE FREE 10 ML: 5 INJECTION INTRAVENOUS at 22:46

## 2024-05-31 RX ADMIN — SODIUM CHLORIDE, POTASSIUM CHLORIDE, SODIUM LACTATE AND CALCIUM CHLORIDE: 600; 310; 30; 20 INJECTION, SOLUTION INTRAVENOUS at 00:27

## 2024-05-31 RX ADMIN — HYDROMORPHONE HYDROCHLORIDE 0.25 MG: 1 INJECTION, SOLUTION INTRAMUSCULAR; INTRAVENOUS; SUBCUTANEOUS at 00:32

## 2024-05-31 RX ADMIN — POLYETHYLENE GLYCOL 3350 17 G: 17 POWDER, FOR SOLUTION ORAL at 14:07

## 2024-05-31 RX ADMIN — HYDROXYZINE HYDROCHLORIDE 25 MG: 50 INJECTION, SOLUTION INTRAMUSCULAR at 08:05

## 2024-05-31 ASSESSMENT — PAIN SCALES - WONG BAKER
WONGBAKER_NUMERICALRESPONSE: NO HURT

## 2024-05-31 ASSESSMENT — PAIN DESCRIPTION - ONSET: ONSET: ON-GOING

## 2024-05-31 ASSESSMENT — ENCOUNTER SYMPTOMS
BLOOD IN STOOL: 0
WHEEZING: 0
ABDOMINAL PAIN: 0
SHORTNESS OF BREATH: 0
TROUBLE SWALLOWING: 1

## 2024-05-31 ASSESSMENT — PAIN SCALES - GENERAL
PAINLEVEL_OUTOF10: 7
PAINLEVEL_OUTOF10: 7
PAINLEVEL_OUTOF10: 0
PAINLEVEL_OUTOF10: 0
PAINLEVEL_OUTOF10: 4

## 2024-05-31 ASSESSMENT — PAIN DESCRIPTION - ORIENTATION
ORIENTATION: MID
ORIENTATION: LOWER
ORIENTATION: LOWER

## 2024-05-31 ASSESSMENT — PAIN DESCRIPTION - LOCATION
LOCATION: BACK

## 2024-05-31 ASSESSMENT — PAIN DESCRIPTION - DESCRIPTORS
DESCRIPTORS: ACHING;DISCOMFORT;DULL
DESCRIPTORS: ACHING;THROBBING;TEARING
DESCRIPTORS: ACHING;THROBBING;SQUEEZING

## 2024-05-31 ASSESSMENT — PAIN DESCRIPTION - FREQUENCY: FREQUENCY: CONTINUOUS

## 2024-05-31 ASSESSMENT — PAIN - FUNCTIONAL ASSESSMENT: PAIN_FUNCTIONAL_ASSESSMENT: PREVENTS OR INTERFERES SOME ACTIVE ACTIVITIES AND ADLS

## 2024-05-31 ASSESSMENT — PAIN DESCRIPTION - PAIN TYPE: TYPE: CHRONIC PAIN

## 2024-05-31 NOTE — PROGRESS NOTES
Requested for an alternative med. Instead of flexeril but Dr. Rodriguez feels we continue with other pain meds.

## 2024-05-31 NOTE — PLAN OF CARE
Problem: Discharge Planning  Goal: Discharge to home or other facility with appropriate resources  Outcome: Progressing  Flowsheets (Taken 5/30/2024 2030)  Discharge to home or other facility with appropriate resources: Arrange for needed discharge resources and transportation as appropriate     Problem: Pain  Goal: Verbalizes/displays adequate comfort level or baseline comfort level  Outcome: Progressing     Problem: Safety - Adult  Goal: Free from fall injury  5/30/2024 2251 by Bennie Decker, RN  Outcome: Progressing  Flowsheets (Taken 5/30/2024 2030)  Free From Fall Injury: Instruct family/caregiver on patient safety  5/30/2024 1152 by Linda Gracia, RN  Outcome: Progressing     Problem: Skin/Tissue Integrity  Goal: Absence of new skin breakdown  Description: 1.  Monitor for areas of redness and/or skin breakdown  2.  Assess vascular access sites hourly  3.  Every 4-6 hours minimum:  Change oxygen saturation probe site  4.  Every 4-6 hours:  If on nasal continuous positive airway pressure, respiratory therapy assess nares and determine need for appliance change or resting period.  Outcome: Progressing     Problem: ABCDS Injury Assessment  Goal: Absence of physical injury  Outcome: Progressing  Flowsheets (Taken 5/30/2024 2030)  Absence of Physical Injury: Implement safety measures based on patient assessment     Problem: Neurosensory - Adult  Goal: Achieves maximal functionality and self care  Outcome: Progressing  Flowsheets (Taken 5/30/2024 2030)  Achieves maximal functionality and self care: Monitor swallowing and airway patency with patient fatigue and changes in neurological status     Problem: Respiratory - Adult  Goal: Achieves optimal ventilation and oxygenation  Outcome: Progressing  Flowsheets (Taken 5/30/2024 2030)  Achieves optimal ventilation and oxygenation: Assess for changes in respiratory status     Problem: Skin/Tissue Integrity - Adult  Goal: Skin integrity remains intact  Outcome:  Progressing  Flowsheets (Taken 5/30/2024 2030)  Skin Integrity Remains Intact: Monitor for areas of redness and/or skin breakdown     Problem: Musculoskeletal - Adult  Goal: Return mobility to safest level of function  Outcome: Progressing  Goal: Maintain proper alignment of affected body part  Outcome: Progressing  Goal: Return ADL status to a safe level of function  Outcome: Progressing     Problem: Infection - Adult  Goal: Absence of infection at discharge  Outcome: Progressing  Flowsheets (Taken 5/30/2024 2030)  Absence of infection at discharge: Assess and monitor for signs and symptoms of infection     Problem: Hematologic - Adult  Goal: Maintains hematologic stability  Outcome: Progressing  Flowsheets (Taken 5/30/2024 2030)  Maintains hematologic stability: Assess for signs and symptoms of bleeding or hemorrhage     Problem: Anxiety  Goal: Will report anxiety at manageable levels  Description: INTERVENTIONS:  1. Administer medication as ordered  2. Teach and rehearse alternative coping skills  3. Provide emotional support with 1:1 interaction with staff  Outcome: Progressing  Flowsheets (Taken 5/30/2024 2030)  Will report anxiety at manageable levels: Administer medication as ordered     Problem: Self Care Deficit  Goal: Return ADL status to a safe level of function  Description: INTERVENTIONS:  1. Administer medication as ordered  2. Assess ADL deficits and provide assistive devices as needed  3. Obtain PT/OT consults as needed  4. Assist and instruct patient to increase activity and self care as tolerated  Outcome: Progressing

## 2024-05-31 NOTE — PROGRESS NOTES
Speech Language Pathology  NAME:  Derek Alvarado  :  1960  DATE: 2024  ROOM:  [facility]/5417-B    Pt unavailable at 1445 for  Modified Barium Swallow Study (MBSS) Discussed with patient nurse via phone     REASON:  Declined intervention despite encouragement and education of benefits of participation. Patient requesting it be completed at another time d/t c/o back pain. Patient aware MBSS is unable to be completed until Monday 6/3 d/t radiology schedule.     Will re-attempt as appropriate.       Thank You    Mónica Mooney M.S., CCC-SLP  Speech-Language Pathologist  SP. [phone]

## 2024-05-31 NOTE — PROGRESS NOTES
[facility]    Inpatient Medical Oncology/Hematology follow-up:      Patient Name: Derek Alvarado  YOB: 1960    DATE OF ADMISSION: 5/24/2024  DATE OF CONSULTATION: 5/25/2024  CONSULTING PROVIDER: Adelaida Talley MD  REASON FOR CONSULTATION: \"Anemia, hypercalcemia, rule out multiple myeloma\"  PCP: John Nuno    Room: [address] [place] [address]      CHIEF COMPLAINT:  Back pain      Subjective:  No acute events overnight. Receiving IV fluids. He is having difficulty swallowing pills.     HPI from Initial Inpatient Consultation  (5/25/2024):   Patient is a 64 y.o. male comes in for back pain. He initially presented to [facility] with these symptoms that started in recent days. Also complains of right lower extremity cramping with some degree of weakness. He was somewhat hard of hearing. Patient c/o right arm tingling and some weakness.  Apart from his lower back he does not have significant pain else where including the neck.    Upon admission, labs found his Hgb was 8.1 with Hgb; calcium was 12.2. Creatinine was 1.6.       Review of Systems   Constitutional:  Negative for fatigue and fever.   HENT:  Positive for trouble swallowing.    Respiratory:  Negative for shortness of breath and wheezing.    Cardiovascular:  Negative for chest pain, palpitations and leg swelling.   Gastrointestinal:  Negative for abdominal pain and blood in stool.   Hematological: Negative.               Current Facility-Administered Medications   Medication Dose Route Frequency Provider Last Rate Last Admin    hydrOXYzine (VISTARIL) injection 25 mg  25 mg IntraMUSCular Q6H PRN Dave Martinez MD   25 mg at 05/31/24 0805    0.9 % sodium chloride infusion   IntraVENous PRN Itzel Hoskins APRN - CNP        acetaminophen (TYLENOL) 160 MG/5ML solution 650 mg  650 mg Oral See Admin Instructions Itzel Hoskins APRN - CNP        diphenhydrAMINE (BENADRYL) injection 25 mg  25 mg IntraVENous See Admin Instructions  Itzel Hoskins APRN - CNP        sennosides-docusate sodium (SENOKOT-S) 8.6-50 MG tablet 1 tablet  1 tablet Oral Daily Robyn Piper APRN - CNP   1 tablet at 05/30/24 0913    dexAMETHasone (DECADRON) tablet 40 mg  40 mg Oral Daily Itzel Hoskins APRN - CNP   40 mg at 05/30/24 0914    sodium chloride flush 0.9 % injection 5-40 mL  5-40 mL IntraVENous PRN Claire Ko MD        sodium chloride flush 0.9 % injection 5-40 mL  5-40 mL IntraVENous BID Claire Ko MD   10 mL at 05/30/24 0918    lactated ringers IV soln infusion   IntraVENous Continuous Adelaida Talley MD 75 mL/hr at 05/31/24 0027 New Bag at 05/31/24 0027    oxyCODONE (ROXICODONE) immediate release tablet 5 mg  5 mg Oral Q6H PRN Maria Ines No APRN - CNP   5 mg at 05/29/24 2050    diphenhydrAMINE-zinc acetate (BENADRYL) cream   Topical TID PRN Ericka Solomon MD        pamidronate (AREDIA) 30 mg in sodium chloride 0.9 % 500 mL infusion  30 mg IntraVENous Once Adelaida Talley MD        HYDROmorphone (DILAUDID) injection 0.25 mg  0.25 mg IntraVENous Q4H PRN Gibson Ng, DO   0.25 mg at 05/31/24 0032    hydrALAZINE (APRESOLINE) injection 5 mg  5 mg IntraVENous Q6H PRN Gibson Ng DO   5 mg at 05/29/24 2050    cyclobenzaprine (FLEXERIL) tablet 10 mg  10 mg Oral TID PRN Dave Martinez MD   10 mg at 05/29/24 1248    folic acid (FOLVITE) tablet 1 mg  1 mg Oral Daily Dave Martinez MD   1 mg at 05/30/24 0913    sennosides-docusate sodium (SENOKOT-S) 8.6-50 MG tablet 2 tablet  2 tablet Oral Daily PRN Nisha Mathis APRN - CNP        [Held by provider] enoxaparin (LOVENOX) injection 40 mg  40 mg SubCUTAneous Daily Lexii Dueñas APRN - CNP   40 mg at 05/26/24 0834    ondansetron (ZOFRAN-ODT) disintegrating tablet 4 mg  4 mg Oral Q8H PRN Lexii Dueñas APRN - CNP        Or    ondansetron (ZOFRAN) injection 4 mg  4 mg IntraVENous Q6H PRN Lexii Dueñas APRN - CNP             No Known Allergies           Vitals:    05/31/24  MALACHI Barragan - CNP  MEDICAL ONCOLOGY  [facility]  5/31/2024    [facility]  P: [phone]  F: [phone]    [facility]  P: [phone]  F: [phone]    [facility] ([place] Office  P: [phone]  F: [phone]

## 2024-05-31 NOTE — PROGRESS NOTES
Comprehensive Nutrition Assessment    Type and Reason for Visit:  Initial, RD Nutrition Re-Screen/LOS    Nutrition Recommendations/Plan:   Continue current diet  Modify ONS to further promote oral intake  Will monitor     Malnutrition Assessment:  Malnutrition Status:  At risk for malnutrition (Comment) (05/31/24 0829)    Context:  Chronic Illness     Findings of the 6 clinical characteristics of malnutrition:  Energy Intake:  Mild decrease in energy intake (Comment) (difficult to assess longterm intake at this time)  Weight Loss:  Unable to assess (d/t lack of wt hx per EMR)     Body Fat Loss:  Unable to assess     Muscle Mass Loss:  Unable to assess    Fluid Accumulation:  No significant fluid accumulation     Strength:  Not Performed    Nutrition Assessment:    pt adm d/t back pain/spinal cord compression, CT shows concern for metastatic disease; RAMONA noted; pt now s/p bone marrow bx 5/29; PMhx of anxiety; hypercalcemia noted upon adm; pt reports poor intake/appetite; will modify ONS to further promote oral intake; will monitor.    Nutrition Related Findings:    A&Ox4; poor dentition ; abd WNL; trace edema; -I/O; bedbound 2/2 back pain; [place]; poor historian; Ca WNL Wound Type: None       Current Nutrition Intake & Therapies:    Average Meal Intake: 26-50%  Average Supplements Intake: Unable to assess  ADULT DIET; Dysphagia - Soft and Bite Sized  ADULT ORAL NUTRITION SUPPLEMENT; Breakfast, Lunch; Standard High Calorie/High Protein Oral Supplement  ADULT ORAL NUTRITION SUPPLEMENT; Dinner; Frozen Oral Supplement    Anthropometric Measures:  Height: 175.3 cm (5' 9.02\") (per office visit encounter via EMR encounters)  Ideal Body Weight (IBW): 160 lbs (73 kg)       Current Body Weight: 68 kg (149 lb 14.6 oz) (5/24-estimated), 93.7 % IBW.    Current BMI (kg/m2): 22.1  Usual Body Weight:  (UTO d/t lack of wt hx per EMR)     Weight Adjustment For: No Adjustment                 BMI Categories: Normal Weight (BMI  18.5-24.9)    Estimated Daily Nutrient Needs:  Energy Requirements Based On: Formula  Weight Used for Energy Requirements: Current  Energy (kcal/day): 0959-3686  Weight Used for Protein Requirements: Current  Protein (g/day): 1.2-1.4g/kgxCBW=80-95g  Method Used for Fluid Requirements: 1 ml/kcal  Fluid (ml/day): 2667-1460    Nutrition Diagnosis:   Inadequate oral intake related to inadequate protein-energy intake (pt reports poor appetite) as evidenced by intake 26-50%    Nutrition Interventions:   Food and/or Nutrient Delivery: Continue Current Diet, Modify Oral Nutrition Supplement (ensure BID and magic cup once/day)  Nutrition Education/Counseling: Education not indicated  Coordination of Nutrition Care: Continue to monitor while inpatient       Goals:     Goals: PO intake 50% or greater, by next RD assessment       Nutrition Monitoring and Evaluation:   Behavioral-Environmental Outcomes: None Identified  Food/Nutrient Intake Outcomes: Food and Nutrient Intake, Supplement Intake  Physical Signs/Symptoms Outcomes: Biochemical Data, Nutrition Focused Physical Findings, Chewing or Swallowing, Skin, Weight, GI Status, Fluid Status or Edema    Discharge Planning:    Continue Oral Nutrition Supplement     Daysi Kellogg RD, LD  Contact: [phone]

## 2024-05-31 NOTE — PROGRESS NOTES
Department of Internal Medicine  Nephrology Attending Progress Note    Events reviewed     SUBJECTIVE: we are following Mr Alvarado for hypercalcemia and elevated creatinine.  Reports no new complaints.    PHYSICAL EXAM:      Vitals:    VITALS:  /82   Pulse 87   Temp 98.4 °F (36.9 °C) (Temporal)   Resp 16   Ht 1.753 m (5' 9.02\") Comment: per office visit encounter via EMR encounters  Wt 68 kg (150 lb)   SpO2 92%   BMI 22.14 kg/m²   24HR INTAKE/OUTPUT:    Intake/Output Summary (Last 24 hours) at 5/31/2024 1058  Last data filed at 5/31/2024 0531  Gross per 24 hour   Intake 120 ml   Output 1050 ml   Net -930 ml         Constitutional: Patient is awake, alert, in no distress  HEENT: Pupils equal reactive  Respiratory: Lungs are clear  Cardiovascular/Edema: Heart sounds are regular  Gastrointestinal: Abdomen soft  Neurologic: No focal  Skin: No lesions  Other: No edema    Scheduled Meds:   acetaminophen  650 mg Oral See Admin Instructions    diphenhydrAMINE  25 mg IntraVENous See Admin Instructions    sennosides-docusate sodium  1 tablet Oral Daily    dexAMETHasone  40 mg Oral Daily    sodium chloride flush  5-40 mL IntraVENous BID    pamidronate (AREDIA) 30 mg in sodium chloride 0.9 % 500 mL infusion  30 mg IntraVENous Once    folic acid  1 mg Oral Daily    [Held by provider] enoxaparin  40 mg SubCUTAneous Daily     Continuous Infusions:   sodium chloride      lactated ringers IV soln 75 mL/hr at 05/31/24 0027     PRN Meds:.hydrOXYzine, sodium chloride, sodium chloride flush, oxyCODONE, diphenhydrAMINE-zinc acetate, HYDROmorphone, hydrALAZINE, cyclobenzaprine, sennosides-docusate sodium, ondansetron **OR** ondansetron      DATA:    CBC:   Lab Results   Component Value Date/Time    WBC 5.9 05/31/2024 04:36 AM    RBC 2.40 05/31/2024 04:36 AM    HGB 8.1 05/31/2024 04:36 AM    HCT 22.7 05/31/2024 04:36 AM    MCV 94.6 05/31/2024 04:36 AM    MCH 33.8 05/31/2024 04:36 AM    MCHC 35.7 05/31/2024 04:36 AM    RDW 15.6

## 2024-05-31 NOTE — PROGRESS NOTES
Physical Therapy  Physical Therapy Treatment    Name: Derek Alvarado  : 1960  MRN: [id number]      Date of Service: 2024    Evaluating PT:  Griselda Cárdenas, PT, DPT, [id number]    Room #:  5417/5417-B  Diagnosis:  Hypercalcemia [E83.52]  PMHx/PSHx:   has a past medical history of Anxiety.   has no past surgical history on file.  Procedure/Surgery:  Bone marrow biopsy and aspiration (2024)   Precautions:  Fall Risk, cognition, + alarms, Spinal Precautions ( compression fx T12, L1 &L4 - chronic, Compression deformities T11/L2/L3/L5), TLSO, R posterior 6th rib fx, [place]  Equipment Needs:  TBD    SUBJECTIVE:    Pt lives with his girlfriend in a mobile home with 3 steps and 1 rail to access. Bed is on 1 floor and bath is on 1 floor.  Pt ambulated with WW?? PTA. Pt reports that his girlfriend assists with all ADLs.    OBJECTIVE:   Initial Evaluation  Date: 24 Treatment  2024 Short Term/ Long Term   Goals   AM-PAC 6 Clicks     Was pt agreeable to Eval/treatment? yes Yes    Does pt have pain? L LBP  Severe back    Bed Mobility  Rolling: Deejay  Supine to sit: ModA  Sit to supine: ModA  Scooting: ModA Rolling: Deejay  Supine to sit: ModA  Sit to supine: MaxA  Scooting: NT Rolling: Independent   Supine to sit: SBA   Sit to supine: SBA  Scooting: SBA   Transfers Sit to stand: ModA  Stand to sit: ModA  Stand pivot: ModA HHA Sit to stand: NT  Stand to sit: NT  Stand pivot: NT Sit to stand: SBA  Stand to sit: SBA  Stand pivot: SBA   Ambulation    A few steps to bedside chair with HHA ModA NT 50+ feet with AAD SBA   Stair negotiation: ascended and descended  NT NT TBD   ROM BUE:  Refer to OT note  BLE:  WFL     Strength BUE:  Refer to OT note  BLE:  NT     Balance Sitting EOB:  SBA  Dynamic Standing:  ModA HHA Sitting EOB:  Deejay  Dynamic Standing:  NT Sitting EOB:  Independent   Dynamic Standing:  SBA AAD     Sensation:  No reports of numbness/tingling to extremities  Edema:  Unremarkable    Vitals:   HR

## 2024-05-31 NOTE — CARE COORDINATION
5/31/2024Social work transition of care planning  Pt plan is to [facility],auth started yesterday.  Electronically signed by SENDY Santo on 5/31/2024 at 9:16 AM    Addendum: Ashutosh notified that auth obtained. Charge RN checking with Nephrology and Hem/Onc for clearance for dc. Sw will follow.  Electronically signed by SENDY Santo on 5/31/2024 at 11:07 AM    Addendum: auth good for 5 days,per liaison.  If pt discharges over the weekend, please call Liaison [phone]. Pt is in [facility] call with [facility] [phone] for 5/31. Awaiting MBBS.  Electronically signed by SENDY Santo on 5/31/2024 at 11:29 AM    Addendum: Pt declined MBBS. Per NP patient ok to dc today. [facility] set up [facility] for 8:30 pm. Ashutosh requested an outsource for earlier time. Ashutosh notified facility liaison,pt's Gf/roommate, and RN.  Electronically signed by SENDY Santo on 5/31/2024 at 3:45 PM

## 2024-05-31 NOTE — PROGRESS NOTES
Occupational Therapy  OT BEDSIDE TREATMENT NOTE   [facility]  [address], [place]        Date:2024  Patient Name: Derek Alvarado  MRN: [id number]  : 1960  Room: [facility]     Per OT Eval:    Evaluating OT: Edna Whittaker OTR/L; [id number]        Referring Provider: Claire Ko MD     Specific Provider Orders/Date: OT Eval and Treat 24        Diagnosis: Hypercalcemia;  Acute right-sided low back pain with sciatica; Lytic bone lesions on xray; Macrocytic anemia; Multiple myeloma not having achieved remission.     Surgery: None this admission.     Pertinent Medical History:  has a past medical history of Anxiety.      Recommended Adaptive Equipment: TBD      Precautions:  Fall Risk, cognition, +alarms, [place], TLSO, spinal precautions (compression fx T12, L1 & L4 - chronic, compression deformities T11/L2/L3/L5), NPO      Assessment of current deficits    [x] Functional mobility            [x]ADLs           [x] Strength                  [x]Cognition    [x] Functional transfers          [x] IADLs         [x] Safety Awareness   [x]Endurance    [x] Fine Coordination                         [x] Balance      [] Vision/perception   []Sensation      []Gross Motor Coordination             [] ROM           [] Delirium                   [] Motor Control      OT PLAN OF CARE   OT POC based on physician orders, patient diagnosis and results of clinical assessment     Frequency/Duration 3-5 days/wk for 2 weeks PRN   Specific OT Treatment Interventions to include:   * Instruction/training on adapted ADL techniques and AE recommendations to increase functional independence within precautions       * Training on energy conservation strategies, correct breathing pattern and techniques to improve independence/tolerance for self-care routine  * Functional transfer/mobility training/DME recommendations for increased independence, safety, and fall  Assist  Supervision  Standard commode transfer without use of grab bars  Minimal Assist    Bed Mobility  Log Roll: Minimal Assist   Supine to sit: Moderate Assist   Sit to supine: Moderate Assist   Independent  Supine<>EOB Supine to sit: Stand by Assist   Sit to supine: Stand by Assist    Functional Transfers Sit to stand:Moderate Assist   Stand to sit:Moderate Assist   Stand pivot: Moderate Assist  Commode: NT   Supervision  Sit to Stand  Stand to Sit Sit to stand: Stand by Assist   Stand to sit: Stand by Assist   Stand pivot:  Stand by Assist   Commode:  Stand by Assist    Functional Mobility Moderate Assist   No use of AD to<>from bedside chair SBA  Pt ambulated in room and within hdez with no device  Stand by Assist w/ use of Appropriate AD   Balance Sitting:     Static - Minimal Assist      Dynamic - Minimal Assist   Standing: Moderate Assist  Sitting EOB:  Independent    Standing:  Supervision  Sitting:     Static:  Independent      Dynamic:  Independent   Standing:  Stand by Assist    Activity Tolerance Fair tolerance w/ light activity - increased time 2/2 pain management.  Good Good tolerance w/ light to mod activity   Visual/  Perceptual Glasses: None reported.  WFL       Safety Poor+   Fair+  during ADL completion following spinal precautions      Hand Dominance (?)    AROM (PROM) Strength Additional Info:  Goal: (PRN)   RUE  Shoulder flexion ~90 degrees, distally WFL Shoulder 3-/5, distally 4-/5 grossly tested Fair+  and fair FMC/dexterity noted during ADL tasks Improve overall RUE strength WFL for participation in functional tasks         LUE Shoulder flexion ~90 degrees, distally WFL Shoulder 3-/5, distally 4-/5 grossly tested Fair+  and fair FMC/dexterity noted during ADL tasks Improve overall LUE strength WFL for participation in functional tasks         Hearing: [place]  Sensation: No c/o numbness or tingling  Tone: WFL  Edema: Unremarkable    Education:  Pt was educated on role of OT, goals to

## 2024-05-31 NOTE — PROGRESS NOTES
The patient requests something to make him sleep and for anxiety. Attending  Informed and said the order will be placed

## 2024-05-31 NOTE — PROGRESS NOTES
Palliative Care Department  [phone]  Palliative Care Progress Note  Provider Basia Reynoso APRN-CNS    Derek Alvarado  [id number]  Hospital Day: 8  Date of Initial Consult: 5/25/2024  Referring Provider: Dave Martinez MD  Palliative Medicine was consulted for assistance with: Overwhelming symptoms    HPI:   Derek Alvarado is a 64 y.o. with a medical history of anxiety who was admitted on 5/24/2024 from home with a CHIEF COMPLAINT of low back pain.  Patient is very hard of hearing and a poor historian.  States for the past 3 weeks he has been having severe lower back pain and difficulty walking.  MRI showing severe subacute compression fracture of T12 with up to 80% height loss centrally.  Severe chronic compression fractures of L1.  Moderate chronic compression fracture of L4.  Mild chronic compression deformities of T11, L2, L3 and L5.  Erosive changes and associated abnormal enhancement, most pronounced in the left sacrum and right iliac wing, suspicious for bony metastasis.  Patient was admitted for further medical management.    ASSESSMENT/PLAN:     Pertinent Hospital Diagnoses   Lytic bone lesions  Hypercalcemia  RAMONA on CKD    Palliative Care Encounter / Counseling Regarding Goals of Care  Please see detailed goals of care discussion as below  At this time, Derek Alvarado, Does have capacity for medical decision-making.  Capacity is time limited and situation/question specific  During encounter there was no surrogate medical decision-maker needed  Outcome of goals of care meeting:   Symptom management  Full code  Awaiting bone biopsy results  Considering CAIN at [place]  Wanting more fitted brace  Patient complains about hospital food not tasting well, requested menu  Code status Full Code- continue  Advanced Directives: no POA or [facility] will [facility]  Surrogate/Legal NOK:  Michael Alvarado (parent) [phone]  Krista Damian (girlfriend) [phone]    Pain related to neoplasm:   -Continue Dilaudid 0.25 mg IV

## 2024-06-01 VITALS
HEIGHT: 69 IN | HEART RATE: 78 BPM | TEMPERATURE: 98 F | SYSTOLIC BLOOD PRESSURE: 136 MMHG | WEIGHT: 150 LBS | DIASTOLIC BLOOD PRESSURE: 81 MMHG | BODY MASS INDEX: 22.22 KG/M2 | RESPIRATION RATE: 17 BRPM | OXYGEN SATURATION: 93 %

## 2024-06-01 LAB
BASOPHILS # BLD: 0 K/UL (ref 0–0.2)
BASOPHILS NFR BLD: 0 % (ref 0–2)
EOSINOPHIL # BLD: 0 K/UL (ref 0.05–0.5)
EOSINOPHILS RELATIVE PERCENT: 0 % (ref 0–6)
ERYTHROCYTE [DISTWIDTH] IN BLOOD BY AUTOMATED COUNT: 15.8 % (ref 11.5–15)
HCT VFR BLD AUTO: 22.2 % (ref 37–54)
HGB BLD-MCNC: 7.5 G/DL (ref 12.5–16.5)
LYMPHOCYTES NFR BLD: 0.6 K/UL (ref 1.5–4)
LYMPHOCYTES RELATIVE PERCENT: 11 % (ref 20–42)
MCH RBC QN AUTO: 32.9 PG (ref 26–35)
MCHC RBC AUTO-ENTMCNC: 33.8 G/DL (ref 32–34.5)
MCV RBC AUTO: 97.4 FL (ref 80–99.9)
MONOCYTES NFR BLD: 0.37 K/UL (ref 0.1–0.95)
MONOCYTES NFR BLD: 7 % (ref 2–12)
NEUTROPHILS NFR BLD: 82 % (ref 43–80)
NEUTS SEG NFR BLD: 4.32 K/UL (ref 1.8–7.3)
PLATELET # BLD AUTO: 161 K/UL (ref 130–450)
PMV BLD AUTO: 9.4 FL (ref 7–12)
RBC # BLD AUTO: 2.28 M/UL (ref 3.8–5.8)
RBC # BLD: ABNORMAL 10*6/UL
WBC OTHER # BLD: 5.3 K/UL (ref 4.5–11.5)

## 2024-06-01 PROCEDURE — 6360000002 HC RX W HCPCS: Performed by: NURSE PRACTITIONER

## 2024-06-01 PROCEDURE — NBSRV NON-BILLABLE SERVICE: Performed by: RADIOLOGY

## 2024-06-01 PROCEDURE — 36415 COLL VENOUS BLD VENIPUNCTURE: CPT

## 2024-06-01 PROCEDURE — 6370000000 HC RX 637 (ALT 250 FOR IP): Performed by: STUDENT IN AN ORGANIZED HEALTH CARE EDUCATION/TRAINING PROGRAM

## 2024-06-01 PROCEDURE — 6360000002 HC RX W HCPCS: Performed by: INTERNAL MEDICINE

## 2024-06-01 PROCEDURE — 85025 COMPLETE CBC W/AUTO DIFF WBC: CPT

## 2024-06-01 PROCEDURE — 6370000000 HC RX 637 (ALT 250 FOR IP): Performed by: NURSE PRACTITIONER

## 2024-06-01 PROCEDURE — 2580000003 HC RX 258: Performed by: INTERNAL MEDICINE

## 2024-06-01 RX ADMIN — HYDROMORPHONE HYDROCHLORIDE 0.25 MG: 1 INJECTION, SOLUTION INTRAMUSCULAR; INTRAVENOUS; SUBCUTANEOUS at 01:33

## 2024-06-01 RX ADMIN — ACETAMINOPHEN 650 MG: 650 SOLUTION ORAL at 08:09

## 2024-06-01 RX ADMIN — FOLIC ACID 1 MG: 1 TABLET ORAL at 08:09

## 2024-06-01 RX ADMIN — SENNOSIDES AND DOCUSATE SODIUM 1 TABLET: 50; 8.6 TABLET ORAL at 08:09

## 2024-06-01 RX ADMIN — DEXAMETHASONE 40 MG: 4 TABLET ORAL at 08:08

## 2024-06-01 RX ADMIN — SODIUM CHLORIDE, PRESERVATIVE FREE 10 ML: 5 INJECTION INTRAVENOUS at 08:09

## 2024-06-01 ASSESSMENT — PAIN DESCRIPTION - ORIENTATION
ORIENTATION: LOWER
ORIENTATION: LOWER

## 2024-06-01 ASSESSMENT — PAIN DESCRIPTION - LOCATION
LOCATION: BACK
LOCATION: BACK

## 2024-06-01 ASSESSMENT — PAIN SCALES - GENERAL
PAINLEVEL_OUTOF10: 4
PAINLEVEL_OUTOF10: 7

## 2024-06-01 ASSESSMENT — PAIN DESCRIPTION - FREQUENCY: FREQUENCY: CONTINUOUS

## 2024-06-01 ASSESSMENT — PAIN DESCRIPTION - ONSET: ONSET: GRADUAL

## 2024-06-01 ASSESSMENT — PAIN DESCRIPTION - DESCRIPTORS
DESCRIPTORS: ACHING;DISCOMFORT;DULL
DESCRIPTORS: ACHING;THROBBING

## 2024-06-01 ASSESSMENT — PAIN DESCRIPTION - PAIN TYPE: TYPE: CHRONIC PAIN

## 2024-06-01 ASSESSMENT — PAIN - FUNCTIONAL ASSESSMENT: PAIN_FUNCTIONAL_ASSESSMENT: PREVENTS OR INTERFERES SOME ACTIVE ACTIVITIES AND ADLS

## 2024-06-01 NOTE — DISCHARGE SUMMARY
[facility]   Discharge summary   Patient ID:  Derek Alvarado  [id number]  64 y.o.  1960    Admit date: 5/24/2024    Discharge date and time: 6/1/2024    Admission Diagnoses:   Patient Active Problem List   Diagnosis    Anxiety    Essential hypertension    Hypercalcemia    Acute right-sided low back pain with sciatica    Lytic bone lesions on xray    Palliative care encounter    Goals of care, counseling/discussion    Macrocytic anemia    Multiple myeloma not having achieved remission (HCC)       Discharge Diagnoses: Hypercalcemia, multiple myeloma    Consults: nephrology, hematology/oncology, and radiation oncology    Procedures: Bone marrow biopsy    Hospital Course: The patient is a 64 y.o. male of John Nuno APRN - CNP     64 y.o. male presented with LOW BACK PAIN. HE IS VERY [place] AND A POOR HISTORIAN. HE STATES FOR THE PAST 3 WEEKS HE HAS HAD SEVERE LBP AND DIFFICULTY WALKING. HIS GIRLFRIEND WAS RUBBING HIS LOWER BACK WITH A PAIN CREAM AND IT DID NOT WORK. HE DENIES FALLING     LYTIC BONE LESIONS  SPEP  UMRIPEP     HYPERCALCEMIA  NEPHROLOGY CONSULT  NS   CALCITONIN   PTH  IONIZED CALCIUM      HTN  MONITOR PRESSURE  HOLD HCTZ BC OF HYPERCALCEMIA     DEPRESSION/ANXIETY  VISTARIL     5/27/24:  -Ionized calcium his serum calcium about the same as yesterday, 1.49 and 11.2  -Continue IVF NS at 100  -Order for bone biopsy per heme-onc recommendations  -Await timing  -K+ 3.2, defer to nephro  -Mg+ 1.5, defer replacement to nephro  -Continue to monitor H&H, 7.0/21.4  -BPs remain mildly elevated     5/28/24:  -Improving kidney function, 19/1.5  -Ca+ 9.3, ionized calcium 1.25  -Remains on IVF however switched to LR at 75 per nephrology  -Plans for bone marrow biopsy tomorrow  -Hold Lovenox, n.p.o. at midnight  -Diet changed to soft and bite-size due to difficulty chewing per patient     5/29/24:  -Bone marrow biopsy today   -H/H 6.9/20.6,defer to hem/onc  -K+ 3.2, per nephro  -Remains on IVF LR at

## 2024-06-01 NOTE — PROGRESS NOTES
One on one nurse handover done between  RN from [place] and said they will pick the patient before 0700 am this morning.

## 2024-06-01 NOTE — PROGRESS NOTES
Per [facility] okay to give previous diet back until pt leaves. Updated on the transportation timing

## 2024-06-01 NOTE — CARE COORDINATION
CM Update: Discharge order noted. Pt discharging to [place]. Transport arranged yesterday evening and pushed back numerous times to this morning. GF, liasion and nursing aware. Transport envelope on soft chart (TF)        LEIGH EtienneN,RN  Case Management  [phone]

## 2024-06-01 NOTE — PROGRESS NOTES
Hospitalist Progress Note      PCP: John Nuno APRN - CNP    Date of Admission: 5/24/2024        Hospital Course:  64 y.o. male presented with LOW BACK PAIN.  HE IS VERY [place] AND A POOR HISTORIAN.  HE STATES FOR THE PAST 3 WEEKS HE HAS HAD SEVERE LBP AND DIFFICULTY WALKING.  HIS   GIRLFRIEND WAS RUBBING HIS LOWER BACK WITH A PAIN CREAM AND IT DID NOT WORK. HE DENIES FALLING       5/26 FOR [facility]    Subjective:     Resting soundly on assessment      Medications:  Reviewed    Infusion Medications    sodium chloride       Scheduled Medications    polyethylene glycol  17 g Oral Daily    diphenhydrAMINE  25 mg IntraVENous See Admin Instructions    sennosides-docusate sodium  1 tablet Oral Daily    dexAMETHasone  40 mg Oral Daily    sodium chloride flush  5-40 mL IntraVENous BID    pamidronate (AREDIA) 30 mg in sodium chloride 0.9 % 500 mL infusion  30 mg IntraVENous Once    folic acid  1 mg Oral Daily    [Held by provider] enoxaparin  40 mg SubCUTAneous Daily     PRN Meds: hydrOXYzine, sodium chloride, sodium chloride flush, oxyCODONE, diphenhydrAMINE-zinc acetate, HYDROmorphone, hydrALAZINE, cyclobenzaprine, sennosides-docusate sodium, ondansetron **OR** ondansetron      Intake/Output Summary (Last 24 hours) at 6/1/2024 1148  Last data filed at 6/1/2024 0942  Gross per 24 hour   Intake 100 ml   Output 1050 ml   Net -950 ml         Exam:    /81   Pulse 78   Temp 98 °F (36.7 °C) (Temporal)   Resp 17   Ht 1.753 m (5' 9.02\") Comment: per office visit encounter via EMR encounters  Wt 68 kg (150 lb)   SpO2 93%   BMI 22.14 kg/m²         eneral appearance:  No apparent distress, appears stated age.  HEENT:  Normal cephalic, [place]  Neck: Supple, with full range of motion. No jugular venous distention. Trachea midline.  Respiratory:  Normal respiratory effort. Clear to auscultation,   Cardiovascular:  RRR  Abdomen: Soft, non-tender, non-distended with normal bowel sounds.  Musculoskeletal:  No  clubbing, cyanosis or edema bilaterally.    Skin: smooth and dry   Neurologic:   Cranial nerves: II-XII intact,   Psychiatric:  Alert and oriented x 3                     Labs:   Recent Labs     05/30/24 0455 05/31/24 0436 06/01/24 0457   WBC 3.7* 5.9 5.3   HGB 8.6* 8.1* 7.5*   HCT 25.3* 22.7* 22.2*    160 161       Recent Labs     05/30/24 0455 05/31/24 0436    133   K 3.8 3.5   CL 99 100   CO2 16* 20*   BUN 18 30*   CREATININE 1.3* 1.3*   CALCIUM 10.6* 9.7       Recent Labs     05/30/24 0455 05/31/24 0436   AST 13 10   ALT 5 <5   BILITOT 1.3* 0.8   ALKPHOS 54 50       No results for input(s): \"INR\" in the last 72 hours.    No results for input(s): \"CKTOTAL\", \"TROPONINI\" in the last 72 hours.  Recent Labs     05/30/24 0455 05/31/24 0436   AST 13 10   ALT 5 <5   BILITOT 1.3* 0.8   ALKPHOS 54 50       No results for input(s): \"LACTA\" in the last 72 hours.  No results found for: \"LABURIC\", \"URICACID\"  No results found for: \"AMMONIA\"    Assessment:    Active Hospital Problems    Diagnosis Date Noted    Multiple myeloma not having achieved remission (HCC) [C90.00] 05/27/2024    Macrocytic anemia [D53.9] 05/26/2024    Lytic bone lesions on xray [M89.9] 05/25/2024    Palliative care encounter [Z51.5] 05/25/2024    Goals of care, counseling/discussion [Z71.89] 05/25/2024    Hypercalcemia [E83.52] 05/24/2024    Acute right-sided low back pain with sciatica [M54.40] 05/24/2024    Essential hypertension [I10] 10/22/2020   DEPRESSION        PLAN:     LYTIC BONE LESIONS  SPEP  UMRIPEP     HYPERCALCEMIA  NEPHROLOGY CONSULT  NS   CALCITONIN   PTH  IONIZED CALCIUM      HTN  MONITOR PRESSURE  HOLD HCTZ BC OF HYPERCALCEMIA     DEPRESSION/ANXIETY  VISTARIL    5/27/24:  -Ionized calcium his serum calcium about the same as yesterday, 1.49 and 11.2  -Continue IVF NS at 100  -Order for bone biopsy per heme-onc recommendations  -Await timing  -K+ 3.2, defer to nephro  -Mg+ 1.5, defer replacement to nephro  -Continue

## 2024-06-01 NOTE — PLAN OF CARE
Problem: Discharge Planning  Goal: Discharge to home or other facility with appropriate resources  Recent Flowsheet Documentation  Taken 5/31/2024 2004 by Bennie Decker, RN  Discharge to home or other facility with appropriate resources: Identify barriers to discharge with patient and caregiver  5/31/2024 1807 by Maryann Candelaria RN  Outcome: Completed  5/31/2024 1806 by Maryann Candelaria RN  Outcome: Adequate for Discharge  Flowsheets (Taken 5/31/2024 0835)  Discharge to home or other facility with appropriate resources: Identify barriers to discharge with patient and caregiver     Problem: Pain  Goal: Verbalizes/displays adequate comfort level or baseline comfort level  5/31/2024 1807 by Maryann Candelaria, RN  Outcome: Completed  5/31/2024 1806 by Maryann Candelaria RN  Outcome: Adequate for Discharge     Problem: Safety - Adult  Goal: Free from fall injury  Recent Flowsheet Documentation  Taken 5/31/2024 2004 by Bennie Decker, RN  Free From Fall Injury: Instruct family/caregiver on patient safety  5/31/2024 1807 by Maryann Candelaria RN  Outcome: Completed  5/31/2024 1806 by Maryann Candelaria RN  Outcome: Adequate for Discharge     Problem: Skin/Tissue Integrity  Goal: Absence of new skin breakdown  Description: 1.  Monitor for areas of redness and/or skin breakdown  2.  Assess vascular access sites hourly  3.  Every 4-6 hours minimum:  Change oxygen saturation probe site  4.  Every 4-6 hours:  If on nasal continuous positive airway pressure, respiratory therapy assess nares and determine need for appliance change or resting period.  5/31/2024 1807 by Maryann Candelaria, RN  Outcome: Completed  5/31/2024 1806 by Maryann Candelaria RN  Outcome: Adequate for Discharge     Problem: ABCDS Injury Assessment  Goal: Absence of physical injury  Recent Flowsheet Documentation  Taken 5/31/2024 2004 by Bennie Decker, RN  Absence of Physical Injury: Implement safety measures based on patient assessment  5/31/2024 1807 by Bari  Provide emotional support with 1:1 interaction with staff  5/31/2024 1807 by Maryann Candelaria, RN  Outcome: Completed  5/31/2024 1806 by Maryann Candelaria, RN  Outcome: Adequate for Discharge     Problem: Self Care Deficit  Goal: Return ADL status to a safe level of function  Description: INTERVENTIONS:  1. Administer medication as ordered  2. Assess ADL deficits and provide assistive devices as needed  3. Obtain PT/OT consults as needed  4. Assist and instruct patient to increase activity and self care as tolerated  5/31/2024 1807 by Maryann Candelaria, RN  Outcome: Completed  5/31/2024 1806 by Maryann Candelaria, RN  Outcome: Adequate for Discharge     Problem: Nutrition Deficit:  Goal: Optimize nutritional status  5/31/2024 1807 by Maryann Candelaria, RN  Outcome: Completed  5/31/2024 1806 by Maryann Candelaria, RN  Outcome: Adequate for Discharge

## 2024-06-07 NOTE — CONSULTS
Radiation Oncology          -chart reviewed  -imaging reviewed  -biopsy pending  -FU OP, our office will angy to sched        Vincenzo Hayes III, MD MS [facility]  [facility]  Radiation Oncology  Cell: [phone]    [facility]:  [phone]   FAX: [phone]  [facility]:  [phone]   FAX:    [phone]  [facility]:  [phone]   FAX:  [phone]

## 2024-06-08 ENCOUNTER — APPOINTMENT (OUTPATIENT)
Dept: CT IMAGING | Age: 64
End: 2024-06-08
Payer: MEDICAID

## 2024-06-08 ENCOUNTER — APPOINTMENT (OUTPATIENT)
Dept: GENERAL RADIOLOGY | Age: 64
End: 2024-06-08
Payer: MEDICAID

## 2024-06-08 ENCOUNTER — HOSPITAL ENCOUNTER (INPATIENT)
Age: 64
LOS: 6 days | Discharge: NURSING, REHAB OR LONG-TERM CARE | End: 2024-06-14
Attending: EMERGENCY MEDICINE | Admitting: INTERNAL MEDICINE
Payer: MEDICAID

## 2024-06-08 DIAGNOSIS — R06.00 DYSPNEA AND RESPIRATORY ABNORMALITIES: ICD-10-CM

## 2024-06-08 DIAGNOSIS — C90.00 MULTIPLE MYELOMA NOT HAVING ACHIEVED REMISSION (HCC): ICD-10-CM

## 2024-06-08 DIAGNOSIS — R06.89 DYSPNEA AND RESPIRATORY ABNORMALITIES: ICD-10-CM

## 2024-06-08 DIAGNOSIS — R09.02 HYPOXIA: Primary | ICD-10-CM

## 2024-06-08 PROBLEM — J96.01 ACUTE HYPOXIC RESPIRATORY FAILURE (HCC): Status: ACTIVE | Noted: 2024-06-08

## 2024-06-08 LAB
ANION GAP SERPL CALCULATED.3IONS-SCNC: 9 MMOL/L (ref 7–16)
ATYPICAL LYMPHOCYTE ABSOLUTE COUNT: 0.09 K/UL (ref 0–0.46)
ATYPICAL LYMPHOCYTES: 2 % (ref 0–4)
B PARAP IS1001 DNA NPH QL NAA+NON-PROBE: NOT DETECTED
B PERT DNA SPEC QL NAA+PROBE: NOT DETECTED
B.E.: -0.4 MMOL/L (ref -3–3)
BASOPHILS # BLD: 0.09 K/UL (ref 0–0.2)
BASOPHILS NFR BLD: 2 % (ref 0–2)
BNP SERPL-MCNC: 491 PG/ML (ref 0–125)
BUN SERPL-MCNC: 12 MG/DL (ref 6–23)
C PNEUM DNA NPH QL NAA+NON-PROBE: NOT DETECTED
CALCIUM SERPL-MCNC: 9.9 MG/DL (ref 8.6–10.2)
CHLORIDE SERPL-SCNC: 100 MMOL/L (ref 98–107)
CO2 SERPL-SCNC: 25 MMOL/L (ref 22–29)
COHB: 0.1 % (ref 0–1.5)
CREAT SERPL-MCNC: 1.4 MG/DL (ref 0.7–1.2)
CRITICAL: ABNORMAL
DATE ANALYZED: ABNORMAL
DATE OF COLLECTION: ABNORMAL
EOSINOPHIL # BLD: 0.14 K/UL (ref 0.05–0.5)
EOSINOPHILS RELATIVE PERCENT: 3 % (ref 0–6)
ERYTHROCYTE [DISTWIDTH] IN BLOOD BY AUTOMATED COUNT: 15.8 % (ref 11.5–15)
FLUAV RNA NPH QL NAA+NON-PROBE: NOT DETECTED
FLUBV RNA NPH QL NAA+NON-PROBE: NOT DETECTED
GFR, ESTIMATED: 58 ML/MIN/1.73M2
GLUCOSE SERPL-MCNC: 108 MG/DL (ref 74–99)
HADV DNA NPH QL NAA+NON-PROBE: NOT DETECTED
HCO3: 22 MMOL/L (ref 22–26)
HCOV 229E RNA NPH QL NAA+NON-PROBE: NOT DETECTED
HCOV HKU1 RNA NPH QL NAA+NON-PROBE: NOT DETECTED
HCOV NL63 RNA NPH QL NAA+NON-PROBE: NOT DETECTED
HCOV OC43 RNA NPH QL NAA+NON-PROBE: NOT DETECTED
HCT VFR BLD AUTO: 28.5 % (ref 37–54)
HGB BLD-MCNC: 9.2 G/DL (ref 12.5–16.5)
HHB: 6.8 % (ref 0–5)
HMPV RNA NPH QL NAA+NON-PROBE: NOT DETECTED
HPIV1 RNA NPH QL NAA+NON-PROBE: NOT DETECTED
HPIV2 RNA NPH QL NAA+NON-PROBE: NOT DETECTED
HPIV3 RNA NPH QL NAA+NON-PROBE: DETECTED
HPIV4 RNA NPH QL NAA+NON-PROBE: NOT DETECTED
LAB: ABNORMAL
LACTATE BLDV-SCNC: 1.2 MMOL/L (ref 0.5–2.2)
LYMPHOCYTES NFR BLD: 0.38 K/UL (ref 1.5–4)
LYMPHOCYTES RELATIVE PERCENT: 7 % (ref 20–42)
Lab: 835
M PNEUMO DNA NPH QL NAA+NON-PROBE: NOT DETECTED
MAGNESIUM SERPL-MCNC: 1.7 MG/DL (ref 1.6–2.6)
MCH RBC QN AUTO: 32.9 PG (ref 26–35)
MCHC RBC AUTO-ENTMCNC: 32.3 G/DL (ref 32–34.5)
MCV RBC AUTO: 101.8 FL (ref 80–99.9)
METHB: 0.3 % (ref 0–1.5)
MODE: ABNORMAL
MONOCYTES NFR BLD: 0.28 K/UL (ref 0.1–0.95)
MONOCYTES NFR BLD: 5 % (ref 2–12)
MYELOCYTES ABSOLUTE COUNT: 0.09 K/UL
MYELOCYTES: 2 %
NEUTROPHILS NFR BLD: 80 % (ref 43–80)
NEUTS SEG NFR BLD: 4.21 K/UL (ref 1.8–7.3)
O2 CONTENT: 13.2 ML/DL
O2 SATURATION: 93.2 % (ref 92–98.5)
O2HB: 92.8 % (ref 94–97)
OPERATOR ID: 5100
PATIENT TEMP: 37 C
PCO2: 28.4 MMHG (ref 35–45)
PH BLOOD GAS: 7.51 (ref 7.35–7.45)
PLATELET # BLD AUTO: 175 K/UL (ref 130–450)
PMV BLD AUTO: 9.4 FL (ref 7–12)
PO2: 66.6 MMHG (ref 75–100)
POTASSIUM SERPL-SCNC: 4.1 MMOL/L (ref 3.5–5)
PROCALCITONIN SERPL-MCNC: 0.17 NG/ML (ref 0–0.08)
RBC # BLD AUTO: 2.8 M/UL (ref 3.8–5.8)
RBC # BLD: ABNORMAL 10*6/UL
RSV RNA NPH QL NAA+NON-PROBE: NOT DETECTED
RV+EV RNA NPH QL NAA+NON-PROBE: NOT DETECTED
SARS-COV-2 RNA NPH QL NAA+NON-PROBE: NOT DETECTED
SODIUM SERPL-SCNC: 134 MMOL/L (ref 132–146)
SOURCE, BLOOD GAS: ABNORMAL
SPECIMEN DESCRIPTION: ABNORMAL
THB: 10.1 G/DL (ref 11.5–16.5)
TIME ANALYZED: 847
TROPONIN I SERPL HS-MCNC: 21 NG/L (ref 0–11)
TROPONIN I SERPL HS-MCNC: 23 NG/L (ref 0–11)
WBC OTHER # BLD: 5.3 K/UL (ref 4.5–11.5)

## 2024-06-08 PROCEDURE — 6370000000 HC RX 637 (ALT 250 FOR IP): Performed by: FAMILY MEDICINE

## 2024-06-08 PROCEDURE — 6360000002 HC RX W HCPCS: Performed by: FAMILY MEDICINE

## 2024-06-08 PROCEDURE — 96374 THER/PROPH/DIAG INJ IV PUSH: CPT

## 2024-06-08 PROCEDURE — 0202U NFCT DS 22 TRGT SARS-COV-2: CPT

## 2024-06-08 PROCEDURE — 6360000004 HC RX CONTRAST MEDICATION: Performed by: RADIOLOGY

## 2024-06-08 PROCEDURE — 96366 THER/PROPH/DIAG IV INF ADDON: CPT

## 2024-06-08 PROCEDURE — 93005 ELECTROCARDIOGRAM TRACING: CPT | Performed by: EMERGENCY MEDICINE

## 2024-06-08 PROCEDURE — 2060000000 HC ICU INTERMEDIATE R&B

## 2024-06-08 PROCEDURE — 82805 BLOOD GASES W/O2 SATURATION: CPT

## 2024-06-08 PROCEDURE — 2580000003 HC RX 258: Performed by: FAMILY MEDICINE

## 2024-06-08 PROCEDURE — 71045 X-RAY EXAM CHEST 1 VIEW: CPT

## 2024-06-08 PROCEDURE — 94640 AIRWAY INHALATION TREATMENT: CPT

## 2024-06-08 PROCEDURE — 94664 DEMO&/EVAL PT USE INHALER: CPT

## 2024-06-08 PROCEDURE — 99285 EMERGENCY DEPT VISIT HI MDM: CPT

## 2024-06-08 PROCEDURE — 2580000003 HC RX 258: Performed by: EMERGENCY MEDICINE

## 2024-06-08 PROCEDURE — 83735 ASSAY OF MAGNESIUM: CPT

## 2024-06-08 PROCEDURE — 83605 ASSAY OF LACTIC ACID: CPT

## 2024-06-08 PROCEDURE — 6360000002 HC RX W HCPCS: Performed by: EMERGENCY MEDICINE

## 2024-06-08 PROCEDURE — 83880 ASSAY OF NATRIURETIC PEPTIDE: CPT

## 2024-06-08 PROCEDURE — 87040 BLOOD CULTURE FOR BACTERIA: CPT

## 2024-06-08 PROCEDURE — 36600 WITHDRAWAL OF ARTERIAL BLOOD: CPT

## 2024-06-08 PROCEDURE — 6370000000 HC RX 637 (ALT 250 FOR IP): Performed by: EMERGENCY MEDICINE

## 2024-06-08 PROCEDURE — 84484 ASSAY OF TROPONIN QUANT: CPT

## 2024-06-08 PROCEDURE — 96365 THER/PROPH/DIAG IV INF INIT: CPT

## 2024-06-08 PROCEDURE — 85025 COMPLETE CBC W/AUTO DIFF WBC: CPT

## 2024-06-08 PROCEDURE — 80048 BASIC METABOLIC PNL TOTAL CA: CPT

## 2024-06-08 PROCEDURE — 71275 CT ANGIOGRAPHY CHEST: CPT

## 2024-06-08 PROCEDURE — 84145 PROCALCITONIN (PCT): CPT

## 2024-06-08 RX ORDER — ACETAMINOPHEN 650 MG/1
650 SUPPOSITORY RECTAL EVERY 6 HOURS PRN
Status: DISCONTINUED | OUTPATIENT
Start: 2024-06-08 | End: 2024-06-14 | Stop reason: HOSPADM

## 2024-06-08 RX ORDER — IPRATROPIUM BROMIDE AND ALBUTEROL SULFATE 2.5; .5 MG/3ML; MG/3ML
1 SOLUTION RESPIRATORY (INHALATION)
Status: DISCONTINUED | OUTPATIENT
Start: 2024-06-08 | End: 2024-06-14 | Stop reason: HOSPADM

## 2024-06-08 RX ORDER — METHYLPREDNISOLONE SODIUM SUCCINATE 40 MG/ML
40 INJECTION, POWDER, LYOPHILIZED, FOR SOLUTION INTRAMUSCULAR; INTRAVENOUS EVERY 12 HOURS
Status: DISCONTINUED | OUTPATIENT
Start: 2024-06-08 | End: 2024-06-09

## 2024-06-08 RX ORDER — IPRATROPIUM BROMIDE AND ALBUTEROL SULFATE 2.5; .5 MG/3ML; MG/3ML
1 SOLUTION RESPIRATORY (INHALATION)
Status: COMPLETED | OUTPATIENT
Start: 2024-06-08 | End: 2024-06-08

## 2024-06-08 RX ORDER — ENOXAPARIN SODIUM 100 MG/ML
40 INJECTION SUBCUTANEOUS DAILY
Status: DISCONTINUED | OUTPATIENT
Start: 2024-06-08 | End: 2024-06-14 | Stop reason: HOSPADM

## 2024-06-08 RX ORDER — SODIUM CHLORIDE 0.9 % (FLUSH) 0.9 %
5-40 SYRINGE (ML) INJECTION EVERY 12 HOURS SCHEDULED
Status: DISCONTINUED | OUTPATIENT
Start: 2024-06-08 | End: 2024-06-14 | Stop reason: HOSPADM

## 2024-06-08 RX ORDER — PREDNISONE 20 MG/1
20 TABLET ORAL 2 TIMES DAILY
Status: DISCONTINUED | OUTPATIENT
Start: 2024-06-10 | End: 2024-06-09

## 2024-06-08 RX ORDER — MAGNESIUM SULFATE IN WATER 40 MG/ML
2000 INJECTION, SOLUTION INTRAVENOUS ONCE
Status: COMPLETED | OUTPATIENT
Start: 2024-06-08 | End: 2024-06-08

## 2024-06-08 RX ORDER — SODIUM CHLORIDE 0.9 % (FLUSH) 0.9 %
5-40 SYRINGE (ML) INJECTION PRN
Status: DISCONTINUED | OUTPATIENT
Start: 2024-06-08 | End: 2024-06-14 | Stop reason: HOSPADM

## 2024-06-08 RX ORDER — METHYLPREDNISOLONE SODIUM SUCCINATE 125 MG/2ML
125 INJECTION, POWDER, LYOPHILIZED, FOR SOLUTION INTRAMUSCULAR; INTRAVENOUS ONCE
Status: COMPLETED | OUTPATIENT
Start: 2024-06-08 | End: 2024-06-08

## 2024-06-08 RX ORDER — ONDANSETRON 2 MG/ML
4 INJECTION INTRAMUSCULAR; INTRAVENOUS EVERY 6 HOURS PRN
Status: DISCONTINUED | OUTPATIENT
Start: 2024-06-08 | End: 2024-06-14 | Stop reason: HOSPADM

## 2024-06-08 RX ORDER — FOLIC ACID 1 MG/1
1 TABLET ORAL DAILY
Status: DISCONTINUED | OUTPATIENT
Start: 2024-06-08 | End: 2024-06-14 | Stop reason: HOSPADM

## 2024-06-08 RX ORDER — 0.9 % SODIUM CHLORIDE 0.9 %
1000 INTRAVENOUS SOLUTION INTRAVENOUS ONCE
Status: COMPLETED | OUTPATIENT
Start: 2024-06-08 | End: 2024-06-08

## 2024-06-08 RX ORDER — ACETAMINOPHEN 325 MG/1
650 TABLET ORAL EVERY 6 HOURS PRN
Status: DISCONTINUED | OUTPATIENT
Start: 2024-06-08 | End: 2024-06-14 | Stop reason: HOSPADM

## 2024-06-08 RX ORDER — SODIUM CHLORIDE 9 MG/ML
INJECTION, SOLUTION INTRAVENOUS PRN
Status: DISCONTINUED | OUTPATIENT
Start: 2024-06-08 | End: 2024-06-14 | Stop reason: HOSPADM

## 2024-06-08 RX ORDER — ONDANSETRON 4 MG/1
4 TABLET, ORALLY DISINTEGRATING ORAL EVERY 8 HOURS PRN
Status: DISCONTINUED | OUTPATIENT
Start: 2024-06-08 | End: 2024-06-14 | Stop reason: HOSPADM

## 2024-06-08 RX ORDER — HYDROXYZINE PAMOATE 25 MG/1
25 CAPSULE ORAL 3 TIMES DAILY PRN
Status: DISCONTINUED | OUTPATIENT
Start: 2024-06-08 | End: 2024-06-14 | Stop reason: HOSPADM

## 2024-06-08 RX ADMIN — METHYLPREDNISOLONE SODIUM SUCCINATE 125 MG: 125 INJECTION INTRAMUSCULAR; INTRAVENOUS at 09:09

## 2024-06-08 RX ADMIN — IPRATROPIUM BROMIDE AND ALBUTEROL SULFATE 1 DOSE: 2.5; .5 SOLUTION RESPIRATORY (INHALATION) at 08:31

## 2024-06-08 RX ADMIN — IOPAMIDOL 75 ML: 755 INJECTION, SOLUTION INTRAVENOUS at 12:03

## 2024-06-08 RX ADMIN — MAGNESIUM SULFATE HEPTAHYDRATE 2000 MG: 40 INJECTION, SOLUTION INTRAVENOUS at 09:09

## 2024-06-08 RX ADMIN — SODIUM CHLORIDE 1000 ML: 9 INJECTION, SOLUTION INTRAVENOUS at 11:30

## 2024-06-08 RX ADMIN — SODIUM CHLORIDE, PRESERVATIVE FREE 10 ML: 5 INJECTION INTRAVENOUS at 20:53

## 2024-06-08 RX ADMIN — ENOXAPARIN SODIUM 40 MG: 100 INJECTION SUBCUTANEOUS at 17:40

## 2024-06-08 RX ADMIN — IPRATROPIUM BROMIDE AND ALBUTEROL SULFATE 1 DOSE: 2.5; .5 SOLUTION RESPIRATORY (INHALATION) at 08:32

## 2024-06-08 RX ADMIN — METHYLPREDNISOLONE SODIUM SUCCINATE 40 MG: 40 INJECTION INTRAMUSCULAR; INTRAVENOUS at 17:37

## 2024-06-08 RX ADMIN — IPRATROPIUM BROMIDE AND ALBUTEROL SULFATE 1 DOSE: 2.5; .5 SOLUTION RESPIRATORY (INHALATION) at 08:30

## 2024-06-08 RX ADMIN — IPRATROPIUM BROMIDE AND ALBUTEROL SULFATE 1 DOSE: 2.5; .5 SOLUTION RESPIRATORY (INHALATION) at 20:16

## 2024-06-08 ASSESSMENT — PAIN SCALES - GENERAL
PAINLEVEL_OUTOF10: 0
PAINLEVEL_OUTOF10: 0

## 2024-06-08 ASSESSMENT — LIFESTYLE VARIABLES
HOW MANY STANDARD DRINKS CONTAINING ALCOHOL DO YOU HAVE ON A TYPICAL DAY: PATIENT DOES NOT DRINK
HOW OFTEN DO YOU HAVE A DRINK CONTAINING ALCOHOL: NEVER

## 2024-06-08 ASSESSMENT — PAIN - FUNCTIONAL ASSESSMENT: PAIN_FUNCTIONAL_ASSESSMENT: 0-10

## 2024-06-08 NOTE — ED NOTES
Patient's spo2 between 87/89%, therefore this RN placed patient on 3L NC, spo2 now 94%. Dr. Denson notified.

## 2024-06-08 NOTE — ED PROVIDER NOTES
HPI:  6/8/24, Time: 11:12 AM EDT         Derek Alvarado is a 64 y.o. male presenting to the ED for SOB Hypoxia from ECF, Recently dx with multiple myeloma, beginning several days ago.  The complaint has been persistent, moderate in severity, and worsened by nothing.  Patient recent history of both myeloma also recent admission for altered mental status found to be hyper calcium Shea.  Also found at right compression fracture fourth lumbar vertebrae.  He presents today from [facility] unit for shortness of breath, found to be hypoxic, upon arrival was found to be hypoxic and tachypneic.  He was placed on supplemental oxygen.  He was 89% placed on 3 L nasal cannula.    Review of Systems:   A complete review of systems was performed and pertinent positives and negatives are stated within HPI, all other systems reviewed and are negative.    --------------------------------------------- PAST HISTORY ---------------------------------------------  Past Medical History:  has a past medical history of Anxiety.    Past Surgical History:  has no past surgical history on file.    Social History:  reports that he has never smoked. He has never used smokeless tobacco. He reports that he does not drink alcohol and does not use drugs.    Family History: family history includes Hypertension in his father.     The patient’s home medications have been reviewed.    Allergies: Patient has no known allergies.    -------------------------------------------------- RESULTS -------------------------------------------------  All laboratory and radiology results have been personally reviewed by myself   LABS:  Results for orders placed or performed during the hospital encounter of 06/08/24   Respiratory Panel, Molecular, with COVID-19 (Restricted: peds pts or suitable admitted adults)    Specimen: Nasopharyngeal Swab   Result Value Ref Range    Specimen Description .NASOPHARYNGEAL SWAB     Adenovirus PCR Not Detected Not  mmol/L    Potassium 4.4 3.5 - 5.0 mmol/L    Chloride 103 98 - 107 mmol/L    CO2 22 22 - 29 mmol/L    Anion Gap 11 7 - 16 mmol/L    Glucose 139 (H) 74 - 99 mg/dL    BUN 24 (H) 6 - 23 mg/dL    Creatinine 1.2 0.70 - 1.20 mg/dL    Est, Glom Filt Rate 67 >60 mL/min/1.73m2    Calcium 9.3 8.6 - 10.2 mg/dL   CBC with Auto Differential   Result Value Ref Range    WBC 7.1 4.5 - 11.5 k/uL    RBC 2.49 (L) 3.80 - 5.80 m/uL    Hemoglobin 8.3 (L) 12.5 - 16.5 g/dL    Hematocrit 25.3 (L) 37.0 - 54.0 %    .6 (H) 80.0 - 99.9 fL    MCH 33.3 26.0 - 35.0 pg    MCHC 32.8 32.0 - 34.5 g/dL    RDW 15.6 (H) 11.5 - 15.0 %    Platelets 189 130 - 450 k/uL    MPV 9.8 7.0 - 12.0 fL    Neutrophils % 85 (H) 43.0 - 80.0 %    Lymphocytes % 9 (L) 20.0 - 42.0 %    Monocytes % 3 2.0 - 12.0 %    Eosinophils % 0 0 - 6 %    Basophils % 0 0.0 - 2.0 %    Immature Granulocytes % 3 0.0 - 5.0 %    Neutrophils Absolute 6.02 1.80 - 7.30 k/uL    Lymphocytes Absolute 0.62 (L) 1.50 - 4.00 k/uL    Monocytes Absolute 0.24 0.10 - 0.95 k/uL    Eosinophils Absolute 0.00 (L) 0.05 - 0.50 k/uL    Basophils Absolute 0.01 0.00 - 0.20 k/uL    Immature Granulocytes Absolute 0.23 0.00 - 0.58 k/uL   EKG 12 Lead   Result Value Ref Range    Ventricular Rate 125 BPM    Atrial Rate 125 BPM    P-R Interval 120 ms    QRS Duration 84 ms    Q-T Interval 318 ms    QTc Calculation (Bazett) 458 ms    P Axis 24 degrees    R Axis 11 degrees    T Axis 39 degrees       RADIOLOGY:  Interpreted by Radiologist.  CTA PULMONARY W CONTRAST   Final Result   No evidence of segmental or larger pulmonary embolism.      Trace pericardial effusion of unclear etiology.      Moderate atelectatic changes in the lungs, most pronounced in the left lower   lobe.         XR CHEST PORTABLE   Final Result   Lung volumes are low with some atelectatic changes seen in the mid lungs.  No   acute parenchymal disease.             ------------------------- NURSING NOTES AND VITALS REVIEWED

## 2024-06-08 NOTE — ED NOTES
ED to Inpatient Handoff Report    Notified Greyson that electronic handoff available and patient ready for transport to room 639.    Safety Risks: Difficulty with daily activities and Risk of falls    Patient in Restraints: no    Constant Observer or Patient : no    Telemetry Monitoring Ordered :Yes      Cardiac Rhythm: Sinus tachy    Order to transfer to unit without monitor:YES    Last MEWS: 3 Time completed: 1451    Deterioration Index Score:   Predictive Model Details          40 (Caution)  Factor Value    Calculated 6/8/2024 14:57 28% Supplemental oxygen Nasal cannula    Deterioration Index Model 26% Age 64 years old     13% Pulse 114     10% Respiratory rate 19     10% Cardiac rhythm Sinus tachy     4% Blood pH abnormal (7.506)     3% Hematocrit abnormal (28.5 %)     2% Sodium 134 mmol/L     2% Systolic 121     1% Potassium 4.1 mmol/L     0% Temperature 98.9 °F (37.2 °C)     0% Pulse oximetry 97 %     0% WBC count 5.3 k/uL        Vitals:    06/08/24 1221 06/08/24 1228 06/08/24 1450 06/08/24 1451   BP: 133/85  (!) 145/123 (!) 121/95   Pulse: (!) 117 (!) 118  (!) 114   Resp: 27 19     Temp:    98.9 °F (37.2 °C)   TempSrc:    Oral   SpO2:   90% 97%   Weight:       Height:             Opportunity for questions and clarification was provided.

## 2024-06-09 ENCOUNTER — APPOINTMENT (OUTPATIENT)
Dept: GENERAL RADIOLOGY | Age: 64
End: 2024-06-09
Payer: MEDICAID

## 2024-06-09 ENCOUNTER — APPOINTMENT (OUTPATIENT)
Dept: CT IMAGING | Age: 64
End: 2024-06-09
Payer: MEDICAID

## 2024-06-09 LAB
ANION GAP SERPL CALCULATED.3IONS-SCNC: 11 MMOL/L (ref 7–16)
B.E.: -1.8 MMOL/L (ref -3–3)
BASOPHILS # BLD: 0.01 K/UL (ref 0–0.2)
BASOPHILS NFR BLD: 0 % (ref 0–2)
BUN SERPL-MCNC: 24 MG/DL (ref 6–23)
CALCIUM SERPL-MCNC: 9.3 MG/DL (ref 8.6–10.2)
CHLORIDE SERPL-SCNC: 103 MMOL/L (ref 98–107)
CO2 SERPL-SCNC: 22 MMOL/L (ref 22–29)
COHB: 0 % (ref 0–1.5)
CREAT SERPL-MCNC: 1.2 MG/DL (ref 0.7–1.2)
CRITICAL: ABNORMAL
DATE ANALYZED: ABNORMAL
DATE OF COLLECTION: ABNORMAL
EOSINOPHIL # BLD: 0 K/UL (ref 0.05–0.5)
EOSINOPHILS RELATIVE PERCENT: 0 % (ref 0–6)
ERYTHROCYTE [DISTWIDTH] IN BLOOD BY AUTOMATED COUNT: 15.6 % (ref 11.5–15)
GFR, ESTIMATED: 67 ML/MIN/1.73M2
GLUCOSE BLD-MCNC: 173 MG/DL (ref 74–99)
GLUCOSE SERPL-MCNC: 139 MG/DL (ref 74–99)
HCO3: 21.4 MMOL/L (ref 22–26)
HCT VFR BLD AUTO: 25.3 % (ref 37–54)
HGB BLD-MCNC: 8.3 G/DL (ref 12.5–16.5)
HHB: 8 % (ref 0–5)
IMM GRANULOCYTES # BLD AUTO: 0.23 K/UL (ref 0–0.58)
IMM GRANULOCYTES NFR BLD: 3 % (ref 0–5)
LAB: ABNORMAL
LYMPHOCYTES NFR BLD: 0.62 K/UL (ref 1.5–4)
LYMPHOCYTES RELATIVE PERCENT: 9 % (ref 20–42)
Lab: 1705
MCH RBC QN AUTO: 33.3 PG (ref 26–35)
MCHC RBC AUTO-ENTMCNC: 32.8 G/DL (ref 32–34.5)
MCV RBC AUTO: 101.6 FL (ref 80–99.9)
METHB: 0.3 % (ref 0–1.5)
MODE: ABNORMAL
MONOCYTES NFR BLD: 0.24 K/UL (ref 0.1–0.95)
MONOCYTES NFR BLD: 3 % (ref 2–12)
NEUTROPHILS NFR BLD: 85 % (ref 43–80)
NEUTS SEG NFR BLD: 6.02 K/UL (ref 1.8–7.3)
O2 CONTENT: 12.2 ML/DL
O2 SATURATION: 92 % (ref 92–98.5)
O2HB: 91.7 % (ref 94–97)
OPERATOR ID: ABNORMAL
PATIENT TEMP: 37 C
PCO2: 30.5 MMHG (ref 35–45)
PH BLOOD GAS: 7.46 (ref 7.35–7.45)
PLATELET # BLD AUTO: 189 K/UL (ref 130–450)
PMV BLD AUTO: 9.8 FL (ref 7–12)
PO2: 65.8 MMHG (ref 75–100)
POTASSIUM SERPL-SCNC: 4.4 MMOL/L (ref 3.5–5)
PROCALCITONIN SERPL-MCNC: 0.31 NG/ML (ref 0–0.08)
RBC # BLD AUTO: 2.49 M/UL (ref 3.8–5.8)
SODIUM SERPL-SCNC: 136 MMOL/L (ref 132–146)
SOURCE, BLOOD GAS: ABNORMAL
THB: 9.4 G/DL (ref 11.5–16.5)
TIME ANALYZED: 1711
WBC OTHER # BLD: 7.1 K/UL (ref 4.5–11.5)

## 2024-06-09 PROCEDURE — 5A09457 ASSISTANCE WITH RESPIRATORY VENTILATION, 24-96 CONSECUTIVE HOURS, CONTINUOUS POSITIVE AIRWAY PRESSURE: ICD-10-PCS | Performed by: INTERNAL MEDICINE

## 2024-06-09 PROCEDURE — 82962 GLUCOSE BLOOD TEST: CPT

## 2024-06-09 PROCEDURE — 94640 AIRWAY INHALATION TREATMENT: CPT

## 2024-06-09 PROCEDURE — 85025 COMPLETE CBC W/AUTO DIFF WBC: CPT

## 2024-06-09 PROCEDURE — 2500000003 HC RX 250 WO HCPCS: Performed by: INTERNAL MEDICINE

## 2024-06-09 PROCEDURE — 6360000002 HC RX W HCPCS: Performed by: INTERNAL MEDICINE

## 2024-06-09 PROCEDURE — 6370000000 HC RX 637 (ALT 250 FOR IP): Performed by: FAMILY MEDICINE

## 2024-06-09 PROCEDURE — 2700000000 HC OXYGEN THERAPY PER DAY

## 2024-06-09 PROCEDURE — 2060000000 HC ICU INTERMEDIATE R&B

## 2024-06-09 PROCEDURE — 82805 BLOOD GASES W/O2 SATURATION: CPT

## 2024-06-09 PROCEDURE — 6360000002 HC RX W HCPCS: Performed by: STUDENT IN AN ORGANIZED HEALTH CARE EDUCATION/TRAINING PROGRAM

## 2024-06-09 PROCEDURE — 84145 PROCALCITONIN (PCT): CPT

## 2024-06-09 PROCEDURE — 2580000003 HC RX 258: Performed by: FAMILY MEDICINE

## 2024-06-09 PROCEDURE — 71275 CT ANGIOGRAPHY CHEST: CPT

## 2024-06-09 PROCEDURE — 6360000002 HC RX W HCPCS

## 2024-06-09 PROCEDURE — 71045 X-RAY EXAM CHEST 1 VIEW: CPT

## 2024-06-09 PROCEDURE — 2580000003 HC RX 258

## 2024-06-09 PROCEDURE — 93005 ELECTROCARDIOGRAM TRACING: CPT | Performed by: INTERNAL MEDICINE

## 2024-06-09 PROCEDURE — 6360000002 HC RX W HCPCS: Performed by: FAMILY MEDICINE

## 2024-06-09 PROCEDURE — 80048 BASIC METABOLIC PNL TOTAL CA: CPT

## 2024-06-09 PROCEDURE — 6360000004 HC RX CONTRAST MEDICATION: Performed by: RADIOLOGY

## 2024-06-09 PROCEDURE — 94660 CPAP INITIATION&MGMT: CPT

## 2024-06-09 PROCEDURE — 6370000000 HC RX 637 (ALT 250 FOR IP): Performed by: INTERNAL MEDICINE

## 2024-06-09 PROCEDURE — 2580000003 HC RX 258: Performed by: INTERNAL MEDICINE

## 2024-06-09 RX ORDER — WATER 10 ML/10ML
INJECTION INTRAMUSCULAR; INTRAVENOUS; SUBCUTANEOUS
Status: COMPLETED
Start: 2024-06-09 | End: 2024-06-09

## 2024-06-09 RX ORDER — METHYLPREDNISOLONE SODIUM SUCCINATE 40 MG/ML
40 INJECTION, POWDER, LYOPHILIZED, FOR SOLUTION INTRAMUSCULAR; INTRAVENOUS EVERY 8 HOURS
Status: DISCONTINUED | OUTPATIENT
Start: 2024-06-09 | End: 2024-06-10

## 2024-06-09 RX ORDER — LORAZEPAM 2 MG/ML
1 INJECTION INTRAMUSCULAR ONCE
Status: COMPLETED | OUTPATIENT
Start: 2024-06-09 | End: 2024-06-09

## 2024-06-09 RX ORDER — LORAZEPAM 2 MG/ML
1 INJECTION INTRAMUSCULAR EVERY 4 HOURS PRN
Status: DISCONTINUED | OUTPATIENT
Start: 2024-06-09 | End: 2024-06-09

## 2024-06-09 RX ORDER — WATER 10 ML/10ML
INJECTION INTRAMUSCULAR; INTRAVENOUS; SUBCUTANEOUS
Status: DISPENSED
Start: 2024-06-09 | End: 2024-06-10

## 2024-06-09 RX ORDER — DIGOXIN 125 MCG
125 TABLET ORAL DAILY
Status: DISCONTINUED | OUTPATIENT
Start: 2024-06-09 | End: 2024-06-14 | Stop reason: HOSPADM

## 2024-06-09 RX ORDER — DILTIAZEM HYDROCHLORIDE 5 MG/ML
10 INJECTION INTRAVENOUS ONCE
Status: COMPLETED | OUTPATIENT
Start: 2024-06-09 | End: 2024-06-09

## 2024-06-09 RX ORDER — HYDROXYZINE HYDROCHLORIDE 50 MG/ML
50 INJECTION, SOLUTION INTRAMUSCULAR EVERY 6 HOURS PRN
Status: DISCONTINUED | OUTPATIENT
Start: 2024-06-09 | End: 2024-06-14 | Stop reason: HOSPADM

## 2024-06-09 RX ORDER — MORPHINE SULFATE 2 MG/ML
1 INJECTION, SOLUTION INTRAMUSCULAR; INTRAVENOUS ONCE
Status: COMPLETED | OUTPATIENT
Start: 2024-06-09 | End: 2024-06-09

## 2024-06-09 RX ORDER — MORPHINE SULFATE 2 MG/ML
1 INJECTION, SOLUTION INTRAMUSCULAR; INTRAVENOUS EVERY 4 HOURS PRN
Status: DISCONTINUED | OUTPATIENT
Start: 2024-06-09 | End: 2024-06-14 | Stop reason: HOSPADM

## 2024-06-09 RX ORDER — IPRATROPIUM BROMIDE AND ALBUTEROL SULFATE 2.5; .5 MG/3ML; MG/3ML
1 SOLUTION RESPIRATORY (INHALATION) EVERY 4 HOURS PRN
Status: DISCONTINUED | OUTPATIENT
Start: 2024-06-09 | End: 2024-06-14 | Stop reason: HOSPADM

## 2024-06-09 RX ORDER — WATER 10 ML/10ML
INJECTION INTRAMUSCULAR; INTRAVENOUS; SUBCUTANEOUS
Status: DISPENSED
Start: 2024-06-09 | End: 2024-06-09

## 2024-06-09 RX ADMIN — IPRATROPIUM BROMIDE AND ALBUTEROL SULFATE 1 DOSE: 2.5; .5 SOLUTION RESPIRATORY (INHALATION) at 15:36

## 2024-06-09 RX ADMIN — WATER 10 ML: 1 INJECTION INTRAMUSCULAR; INTRAVENOUS; SUBCUTANEOUS at 13:44

## 2024-06-09 RX ADMIN — LORAZEPAM 1 MG: 2 INJECTION INTRAMUSCULAR; INTRAVENOUS at 19:01

## 2024-06-09 RX ADMIN — IOPAMIDOL 75 ML: 755 INJECTION, SOLUTION INTRAVENOUS at 19:46

## 2024-06-09 RX ADMIN — ENOXAPARIN SODIUM 40 MG: 100 INJECTION SUBCUTANEOUS at 08:22

## 2024-06-09 RX ADMIN — HYDROXYZINE HYDROCHLORIDE 50 MG: 50 INJECTION, SOLUTION INTRAMUSCULAR at 20:52

## 2024-06-09 RX ADMIN — IPRATROPIUM BROMIDE AND ALBUTEROL SULFATE 1 DOSE: 2.5; .5 SOLUTION RESPIRATORY (INHALATION) at 11:37

## 2024-06-09 RX ADMIN — METHYLPREDNISOLONE SODIUM SUCCINATE 40 MG: 40 INJECTION INTRAMUSCULAR; INTRAVENOUS at 02:21

## 2024-06-09 RX ADMIN — DIGOXIN 125 MCG: 0.12 TABLET ORAL at 17:10

## 2024-06-09 RX ADMIN — CEFEPIME 2000 MG: 2 INJECTION, POWDER, FOR SOLUTION INTRAVENOUS at 13:51

## 2024-06-09 RX ADMIN — ACETAMINOPHEN 650 MG: 325 TABLET ORAL at 17:10

## 2024-06-09 RX ADMIN — HYDROXYZINE PAMOATE 25 MG: 25 CAPSULE ORAL at 12:53

## 2024-06-09 RX ADMIN — IPRATROPIUM BROMIDE AND ALBUTEROL SULFATE 1 DOSE: .5; 2.5 SOLUTION RESPIRATORY (INHALATION) at 02:07

## 2024-06-09 RX ADMIN — METHYLPREDNISOLONE SODIUM SUCCINATE 40 MG: 40 INJECTION INTRAMUSCULAR; INTRAVENOUS at 20:28

## 2024-06-09 RX ADMIN — METHYLPREDNISOLONE SODIUM SUCCINATE 40 MG: 40 INJECTION INTRAMUSCULAR; INTRAVENOUS at 13:44

## 2024-06-09 RX ADMIN — DILTIAZEM HYDROCHLORIDE 10 MG: 5 INJECTION, SOLUTION INTRAVENOUS at 16:31

## 2024-06-09 RX ADMIN — IPRATROPIUM BROMIDE AND ALBUTEROL SULFATE 1 DOSE: 2.5; .5 SOLUTION RESPIRATORY (INHALATION) at 19:25

## 2024-06-09 RX ADMIN — MORPHINE SULFATE 1 MG: 2 INJECTION, SOLUTION INTRAMUSCULAR; INTRAVENOUS at 16:35

## 2024-06-09 RX ADMIN — IPRATROPIUM BROMIDE AND ALBUTEROL SULFATE 1 DOSE: 2.5; .5 SOLUTION RESPIRATORY (INHALATION) at 07:48

## 2024-06-09 RX ADMIN — SODIUM CHLORIDE, PRESERVATIVE FREE 10 ML: 5 INJECTION INTRAVENOUS at 22:35

## 2024-06-09 ASSESSMENT — PAIN SCALES - GENERAL: PAINLEVEL_OUTOF10: 0

## 2024-06-09 NOTE — PLAN OF CARE
Rapid response called, 2 physicians showed up  I evaluate this patient at bedside, he does have diminished breath sound in the anterior chest, he has been getting Solu-Medrol and duo nebulizer treatment for acute hypoxic respite failure secondary to COPD exacerbation from acute parainfluenza infection.  ABG obtained 1 hour ago shows acute respiratory alkalosis, secondary to hyperventilation.  He admits of having history of anxiety disorder taking Atarax at home 3 times daily  Will administer 1 mg Ativan IV x 1 and obtain CT angio chest to rule out acute PE  Patient has recently diagnosed with multiple myeloma.      Vitals are stable  No acute mentation change

## 2024-06-09 NOTE — PROGRESS NOTES
[facility] Quality Flow/Interdisciplinary Rounds Progress Note        Quality Flow Rounds held on June 9, 2024    Disciplines Attending:  Bedside Nurse and Nursing Unit Leadership    Derek Alvarado was admitted on 6/8/2024  8:15 AM    Anticipated Discharge Date:       Disposition:    Tip Score:  Tip Scale Score: 16    Readmission Risk              Risk of Unplanned Readmission:  17           Discussed patient goal for the day, patient clinical progression, and barriers to discharge.  The following Goal(s) of the Day/Commitment(s) have been identified:   pulm consult, monitor hr, monitor labs.       Pily York RN  June 9, 2024

## 2024-06-09 NOTE — PROGRESS NOTES
Date: 6/9/2024    Time: 5:36 PM    Patient Placed On BIPAP/CPAP/ Non-Invasive Ventilation?  Yes    If no must comment.  Facial area red/color change? No           If YES are Blister/Lesion present?No   If yes must notify nursing staff  BIPAP/CPAP skin barrier?  Yes    Skin barrier type:mepilexlite     Comments:     06/09/24 2999   NIV Type   $NIV $Daily Charge   Suction Setup and Functional Yes   NIV Started/Stopped On   Equipment Type v60   Mode Bilevel   Mask Type Full face mask   Mask Size Medium   Assessment   Pulse (!) 142   Respirations (!) 32   SpO2 97 %   Level of Consciousness 0   Comfort Level Good   Using Accessory Muscles No   Mask Compliance Good   Skin Assessment Clean, dry, & intact   Skin Protection for O2 Device Yes   Orientation Middle   Location Nose   Intervention(s) Skin Barrier   Settings/Measurements   PIP Observed 15 cm H20   IPAP 12 cmH20   CPAP/EPAP 6 cmH2O   Vt (Measured) 407 mL   Rate Ordered 12   Insp Rise Time (%) 2 %   FiO2  40 %   I Time/ I Time % 0.6 s   Minute Volume (L/min) 13 Liters   Mask Leak (lpm) 64 lpm   Patient's Home Machine No   Alarm Settings   Alarms On Y   Patient Observation   Observations red outlet, red cord in wall alarm       Nayely Mittal RCP

## 2024-06-09 NOTE — PROGRESS NOTES
Dr. Ko called re: patient tachycadria 130-160's. Getting increasingly agitated and anxious. /80's manually. As well as respirations in the 30's. Dr. Abbott also notified of increase respirations.     1607- Dr. Abbott called nursing, reviewed chest xray, no new orders at this time.

## 2024-06-09 NOTE — CONSULTS
[facility]  [address], [address]  [place] [address]    Pulmonary Consultation      Reason for Consult:  sob    Requesting Physician:  John Nuno APRN - CNP    CHIEF COMPLAINT:  sob    History Obtained From:  patient    HISTORY OF PRESENT ILLNESS:                The patient is a 64 y.o. male who presents with increased sob and cough for the past few days, gradually getting worse. Claims not usually on any lung medications or oxygen, usually no issues with physical activity. Recently discharged from hospital on 6/1 for back pain, bone marrow biopsy done on 5/29 for Multiple Myeloma, sent to  for rehab. Noted to be hypoxic when evaluated there, placed on NC since here. Currently lying down in bed on RA, claims feeling slightly better with breathing, similar with cough and min-mod sputum production, hard of hearing, looking agitated.    Past Medical History:        Diagnosis Date    Anxiety        Past Surgical History:    No past surgical history on file.    Current Medications:    Current Facility-Administered Medications: ipratropium 0.5 mg-albuterol 2.5 mg (DUONEB) nebulizer solution 1 Dose, 1 Dose, Inhalation, Q4H PRN  sterile water injection, , ,   folic acid (FOLVITE) tablet 1 mg, 1 mg, Oral, Daily  hydrOXYzine pamoate (VISTARIL) capsule 25 mg, 25 mg, Oral, TID PRN  sodium chloride flush 0.9 % injection 5-40 mL, 5-40 mL, IntraVENous, 2 times per day  sodium chloride flush 0.9 % injection 5-40 mL, 5-40 mL, IntraVENous, PRN  0.9 % sodium chloride infusion, , IntraVENous, PRN  ondansetron (ZOFRAN-ODT) disintegrating tablet 4 mg, 4 mg, Oral, Q8H PRN **OR** ondansetron (ZOFRAN) injection 4 mg, 4 mg, IntraVENous, Q6H PRN  magnesium hydroxide (MILK OF MAGNESIA) 400 MG/5ML suspension 30 mL, 30 mL, Oral, Daily PRN  enoxaparin (LOVENOX) injection 40 mg, 40 mg, SubCUTAneous, Daily  acetaminophen (TYLENOL) tablet 650 mg, 650 mg, Oral, Q6H  PRN **OR** acetaminophen (TYLENOL) suppository 650 mg, 650 mg, Rectal, Q6H PRN  ipratropium 0.5 mg-albuterol 2.5 mg (DUONEB) nebulizer solution 1 Dose, 1 Dose, Inhalation, Q4H WA RT  methylPREDNISolone sodium succ (SOLU-MEDROL) injection 40 mg, 40 mg, IntraVENous, Q12H **FOLLOWED BY** [START ON 6/10/2024] predniSONE (DELTASONE) tablet 20 mg, 20 mg, Oral, BID    Allergies:  Patient has no known allergies.    Social History:    TOBACCO:   reports that he has never smoked. He has never used smokeless tobacco.    Family History:       Problem Relation Age of Onset    Hypertension Father        REVIEW OF SYSTEMS:    RESPIRATORY:  sob and cough  Remainder of complete ROS is negative.    PHYSICAL EXAM:      Vitals:    BP (!) 143/93   Pulse (!) 125   Temp 99.3 °F (37.4 °C) (Oral)   Resp (!) 34   Ht 1.753 m (5' 9\")   Wt 65.6 kg (144 lb 11.2 oz)   SpO2 92%   BMI 21.37 kg/m²     EYES:  Lids and lashes normal, pupils equal, round and reactive to light, extra ocular muscles intact, sclera clear, conjunctiva normal  ENT:  Normocephalic, without obvious abnormality, atraumatic, sinuses nontender on palpation, external ears without lesions, oral pharynx with moist mucus membranes, tonsils without erythema or exudates, gums normal and good dentition.  RESPIRATORY; bilateral ronchi with cough  CARDIAC: normal rate/rhythm  GASTROINTESTINAL: soft, non-tender  EXTREMITIES: no edema  NEUROLOGIC: awake, conversant, oriented, moving extremities, looking slightly agitated    DATA:    CBC:   Recent Labs     06/08/24  0842 06/09/24  0230   WBC 5.3 7.1   HGB 9.2* 8.3*   HCT 28.5* 25.3*   .8* 101.6*    189       BMP:  Recent Labs     06/08/24  0842 06/09/24  0230    136   K 4.1 4.4    103   CO2 25 22   BUN 12 24*   CREATININE 1.4* 1.2    ALB:3,BILIDIR:3,BILITOT:3,ALKPHOS:3)@    PT/INR: No results for input(s): \"PROTIME\", \"INR\" in the last 72 hours.    ABG:   Recent Labs     06/08/24  0835   PH 7.506*   PO2 66.6*

## 2024-06-09 NOTE — PROGRESS NOTES
Patient removed self from BIPAP, stated it was drying him out. Educated patient on importance of wearing NIV. Patient verbalized understanding, however no learning present.

## 2024-06-09 NOTE — H&P
[facility]  History and Physical      CHIEF COMPLAINT:    Chief Complaint   Patient presents with    Shortness of Breath        Patient of John Nuno APRN - CNP presents with:  Acute hypoxic respiratory failure (HCC)    History of Present Illness:   Patient is a 64-year-old male with a past medical history of anxiety, multiple myeloma.  Patient presented to the ED with complaints of hypoxia at facility.  When patient arrived to the ED he was hypoxic and tachypneic.  Patient was satting 89% was placed on 3 L nasal cannula.  Patient was recently discharged on 6/1/2024 as he was admitted at that time with hypercalcemia and multiple myeloma.  Patient was to follow-up with heme-onc for bone biopsy results.  Patient is currently requiring no supplemental O2 needs as he is 93% on room air.  Patient however does remain tachycardic with a heart rate of 106.  ER workup revealed elevated creatinine 1.4, , troponin 23, positive for parainfluenza 3, imaging revealed trace pericardial effusion.  Patient was started on Solu-Medrol and admitted to telemetry unit for further treatment.  On evaluation he appears quite dyspneic.  Bilateral air entry is markedly diminished in both lung fields.  He is extremely hard of hearing and not really able to answer too many questions he is more concerned about his blood pressure than anything else.  He indicates he is not clear on what type of cancer he has and has not gotten a definitive report on this.    REVIEW OF SYSTEMS:  Pertinent negatives are above in HPI.  10 point ROS otherwise negative.      Past Medical History:   Diagnosis Date    Anxiety          No past surgical history on file.    Medications Prior to Admission:    Medications Prior to Admission: folic acid (FOLVITE) 1 MG tablet, Take 1 tablet by mouth daily  sennosides-docusate sodium (SENOKOT-S) 8.6-50 MG tablet, Take 1 tablet by mouth daily  cyclobenzaprine (FLEXERIL) 10 MG tablet, Take 1 tablet

## 2024-06-09 NOTE — PROGRESS NOTES
NP notified pt becoming wheezy the last hour. Vitals reviewed. PRN Duonebs ordered and respiratory notified

## 2024-06-09 NOTE — PROGRESS NOTES
Notified around 415 PM regarding patient's worsening agitation, heart rate elevated in the 130s to 150s-orders given for ABG, noninvasive ventilation, Cardizem and p.o. digoxin, morphine for anxiety as I like to keep him off benzodiazepines as well as beta-blockers  .  If condition continues to deteriorate, RRT to be called for possible transfer to ICU  -discussed with ROLF Ko MD  6/9/2024

## 2024-06-09 NOTE — PROGRESS NOTES
Patient with increase difficulty breathing, drinks water with coughing. Has decreased lung sounds in the right side complaining of pain in the left. Patient is increasingly anxious with wet cough. Need sputum.

## 2024-06-10 LAB
ANION GAP SERPL CALCULATED.3IONS-SCNC: 8 MMOL/L (ref 7–16)
BASOPHILS # BLD: 0 K/UL (ref 0–0.2)
BASOPHILS NFR BLD: 0 % (ref 0–2)
BUN SERPL-MCNC: 35 MG/DL (ref 6–23)
CALCIUM SERPL-MCNC: 9.4 MG/DL (ref 8.6–10.2)
CHLORIDE SERPL-SCNC: 105 MMOL/L (ref 98–107)
CO2 SERPL-SCNC: 24 MMOL/L (ref 22–29)
CREAT SERPL-MCNC: 1.4 MG/DL (ref 0.7–1.2)
EOSINOPHIL # BLD: 0 K/UL (ref 0.05–0.5)
EOSINOPHILS RELATIVE PERCENT: 0 % (ref 0–6)
ERYTHROCYTE [DISTWIDTH] IN BLOOD BY AUTOMATED COUNT: 16.2 % (ref 11.5–15)
GFR, ESTIMATED: 55 ML/MIN/1.73M2
GLUCOSE SERPL-MCNC: 133 MG/DL (ref 74–99)
HCT VFR BLD AUTO: 25.7 % (ref 37–54)
HGB BLD-MCNC: 8 G/DL (ref 12.5–16.5)
LYMPHOCYTES NFR BLD: 0.17 K/UL (ref 1.5–4)
LYMPHOCYTES RELATIVE PERCENT: 2 % (ref 20–42)
MCH RBC QN AUTO: 32.7 PG (ref 26–35)
MCHC RBC AUTO-ENTMCNC: 31.1 G/DL (ref 32–34.5)
MCV RBC AUTO: 104.9 FL (ref 80–99.9)
MONOCYTES NFR BLD: 0.25 K/UL (ref 0.1–0.95)
MONOCYTES NFR BLD: 3 % (ref 2–12)
NEUTROPHILS NFR BLD: 96 % (ref 43–80)
NEUTS SEG NFR BLD: 9.18 K/UL (ref 1.8–7.3)
PLATELET # BLD AUTO: 179 K/UL (ref 130–450)
PMV BLD AUTO: 10.3 FL (ref 7–12)
POTASSIUM SERPL-SCNC: 4.4 MMOL/L (ref 3.5–5)
RBC # BLD AUTO: 2.45 M/UL (ref 3.8–5.8)
RBC # BLD: ABNORMAL 10*6/UL
SODIUM SERPL-SCNC: 137 MMOL/L (ref 132–146)
WBC OTHER # BLD: 9.6 K/UL (ref 4.5–11.5)

## 2024-06-10 PROCEDURE — 2580000003 HC RX 258: Performed by: INTERNAL MEDICINE

## 2024-06-10 PROCEDURE — 85025 COMPLETE CBC W/AUTO DIFF WBC: CPT

## 2024-06-10 PROCEDURE — 6370000000 HC RX 637 (ALT 250 FOR IP): Performed by: INTERNAL MEDICINE

## 2024-06-10 PROCEDURE — 6360000002 HC RX W HCPCS

## 2024-06-10 PROCEDURE — 94660 CPAP INITIATION&MGMT: CPT

## 2024-06-10 PROCEDURE — 94640 AIRWAY INHALATION TREATMENT: CPT

## 2024-06-10 PROCEDURE — 2580000003 HC RX 258: Performed by: FAMILY MEDICINE

## 2024-06-10 PROCEDURE — 80048 BASIC METABOLIC PNL TOTAL CA: CPT

## 2024-06-10 PROCEDURE — 97161 PT EVAL LOW COMPLEX 20 MIN: CPT

## 2024-06-10 PROCEDURE — 6360000002 HC RX W HCPCS: Performed by: INTERNAL MEDICINE

## 2024-06-10 PROCEDURE — 2580000003 HC RX 258

## 2024-06-10 PROCEDURE — 6370000000 HC RX 637 (ALT 250 FOR IP): Performed by: FAMILY MEDICINE

## 2024-06-10 PROCEDURE — 2700000000 HC OXYGEN THERAPY PER DAY

## 2024-06-10 PROCEDURE — 97165 OT EVAL LOW COMPLEX 30 MIN: CPT

## 2024-06-10 PROCEDURE — 2060000000 HC ICU INTERMEDIATE R&B

## 2024-06-10 PROCEDURE — 6360000002 HC RX W HCPCS: Performed by: FAMILY MEDICINE

## 2024-06-10 RX ORDER — METHYLPREDNISOLONE SODIUM SUCCINATE 40 MG/ML
40 INJECTION, POWDER, LYOPHILIZED, FOR SOLUTION INTRAMUSCULAR; INTRAVENOUS EVERY 12 HOURS
Status: DISCONTINUED | OUTPATIENT
Start: 2024-06-10 | End: 2024-06-11

## 2024-06-10 RX ORDER — WATER 10 ML/10ML
INJECTION INTRAMUSCULAR; INTRAVENOUS; SUBCUTANEOUS
Status: COMPLETED
Start: 2024-06-10 | End: 2024-06-10

## 2024-06-10 RX ORDER — BUDESONIDE 0.5 MG/2ML
0.5 INHALANT ORAL
Status: DISCONTINUED | OUTPATIENT
Start: 2024-06-10 | End: 2024-06-14 | Stop reason: HOSPADM

## 2024-06-10 RX ADMIN — FOLIC ACID 1 MG: 1 TABLET ORAL at 07:56

## 2024-06-10 RX ADMIN — SODIUM CHLORIDE, PRESERVATIVE FREE 10 ML: 5 INJECTION INTRAVENOUS at 21:57

## 2024-06-10 RX ADMIN — SODIUM CHLORIDE, PRESERVATIVE FREE 10 ML: 5 INJECTION INTRAVENOUS at 08:00

## 2024-06-10 RX ADMIN — ENOXAPARIN SODIUM 40 MG: 100 INJECTION SUBCUTANEOUS at 07:57

## 2024-06-10 RX ADMIN — IPRATROPIUM BROMIDE AND ALBUTEROL SULFATE 1 DOSE: 2.5; .5 SOLUTION RESPIRATORY (INHALATION) at 08:30

## 2024-06-10 RX ADMIN — DIGOXIN 125 MCG: 0.12 TABLET ORAL at 07:56

## 2024-06-10 RX ADMIN — IPRATROPIUM BROMIDE AND ALBUTEROL SULFATE 1 DOSE: 2.5; .5 SOLUTION RESPIRATORY (INHALATION) at 15:56

## 2024-06-10 RX ADMIN — BUDESONIDE INHALATION 500 MCG: 0.5 SUSPENSION RESPIRATORY (INHALATION) at 19:08

## 2024-06-10 RX ADMIN — IPRATROPIUM BROMIDE AND ALBUTEROL SULFATE 1 DOSE: 2.5; .5 SOLUTION RESPIRATORY (INHALATION) at 19:08

## 2024-06-10 RX ADMIN — IPRATROPIUM BROMIDE AND ALBUTEROL SULFATE 1 DOSE: 2.5; .5 SOLUTION RESPIRATORY (INHALATION) at 12:54

## 2024-06-10 RX ADMIN — CEFEPIME 2000 MG: 2 INJECTION, POWDER, FOR SOLUTION INTRAVENOUS at 14:22

## 2024-06-10 RX ADMIN — CEFEPIME 2000 MG: 2 INJECTION, POWDER, FOR SOLUTION INTRAVENOUS at 01:04

## 2024-06-10 RX ADMIN — METHYLPREDNISOLONE SODIUM SUCCINATE 40 MG: 40 INJECTION INTRAMUSCULAR; INTRAVENOUS at 03:32

## 2024-06-10 RX ADMIN — WATER 10 ML: 1 INJECTION INTRAMUSCULAR; INTRAVENOUS; SUBCUTANEOUS at 03:32

## 2024-06-10 RX ADMIN — HYDROXYZINE HYDROCHLORIDE 50 MG: 50 INJECTION, SOLUTION INTRAMUSCULAR at 08:09

## 2024-06-10 RX ADMIN — METHYLPREDNISOLONE SODIUM SUCCINATE 40 MG: 40 INJECTION INTRAMUSCULAR; INTRAVENOUS at 14:19

## 2024-06-10 ASSESSMENT — PAIN SCALES - WONG BAKER
WONGBAKER_NUMERICALRESPONSE: NO HURT

## 2024-06-10 ASSESSMENT — PAIN SCALES - GENERAL
PAINLEVEL_OUTOF10: 0

## 2024-06-10 NOTE — PROGRESS NOTES
Upon assessment pt's -120's, rhonchi is all lobe bases and accessory muscle use. Respiratory gave pt Duoneb at 0830 and I gave hydroxyzine 50 mg injection IM at 0809. I notified Dr. Abbott's office and was directed to answering service. I spoke with Tati and informed her of pt's symptoms and she stated she would relay message to Dr. Abbott.

## 2024-06-10 NOTE — PROGRESS NOTES
[facility]   Progress note      Subjective:    The patient is sleeping, noninvasive ventilation in place  Much more restful today  Objective:    /75   Pulse (!) 106   Temp 98.3 °F (36.8 °C) (Oral)   Resp 18   Ht 1.753 m (5' 9\")   Wt 65.6 kg (144 lb 11.2 oz)   SpO2 95%   BMI 21.37 kg/m²     In: 5 [I.V.:5]  Out: 1200   In: 5   Out: 1200 [Urine:1200]    General appearance: NAD, somnolent  HEENT: AT/NC, MMM  Neck: FROM, supple  Lungs: Coarse bilateral rhonchi, markedly diminished bilateral air entry due to advanced COPD  CV: RRR, no MRGs  Vasc: Radial pulses 2+  Abdomen: Soft, non-tender; no masses or HSM  Extremities: No peripheral edema or digital cyanosis  Skin: no rash, lesions or ulcers  Unable to assess psych and neuro     Recent Labs     06/08/24  0842 06/09/24  0230 06/10/24  0330   WBC 5.3 7.1 9.6   HGB 9.2* 8.3* 8.0*   HCT 28.5* 25.3* 25.7*    189 179       Recent Labs     06/08/24  0842 06/09/24  0230 06/10/24  0330    136 137   K 4.1 4.4 4.4    103 105   CO2 25 22 24   BUN 12 24* 35*   CREATININE 1.4* 1.2 1.4*   CALCIUM 9.9 9.3 9.4       Assessment:    Principal Problem:    Acute hypoxic respiratory failure (HCC)  Resolved Problems:    * No resolved hospital problems. *      Plan:    64-year-old male who was recently diagnosed with multiple myeloma presents to the ED with hypoxia and is admitted to telemetry unit with     Acute hypoxic respiratory failure from COPD, now exacerbated with parainfluenza 3 virus  -Supplement O2 demands keeping oxygen saturation greater than 92%.  Patient currently on room air  IV Solu-Medrol 40 mg IV every 6 hours  Pulmonology evaluation as patient known to them  Supplemental oxygenation, may require noninvasive ventilation given his current status  -DuoNebs  Respiratory panel positive for parainfluenza 3-supportive care  No immediate need for IV antibiotic therapy  Procalcitonin is 0.31     Recent diagnosis of multiple

## 2024-06-10 NOTE — ACP (ADVANCE CARE PLANNING)
Advance Care Planning   Healthcare Decision Maker:  dacia buckner Parent [phone]       Click here to complete Healthcare Decision Makers including selection of the Healthcare Decision Maker Relationship (ie \"Primary\").  Today we documented Decision Maker(s) consistent with Legal Next of Kin hierarchy.       dacia buckner Parent [phone]

## 2024-06-10 NOTE — PROGRESS NOTES
Occupational Therapy    OCCUPATIONAL THERAPY INITIAL EVALUATION    [facility]   [address], [place]         Date:6/10/2024                                                  Patient Name: Derek Alvarado    MRN: [id number]    : 1960    Room: [address] [place] [address]A      Evaluating OT: Vanita Patton OTR/L   [id number]      Referring Provider:Lexii Dueñas APRN - CNP     Specific Provider Orders/Date:OT eval and treat 2024      Diagnosis:  Dyspnea and respiratory abnormalities [R06.00, R06.89]  Hypoxia [R09.02]  Acute hypoxic respiratory failure (HCC) [J96.01]     Pertinent Medical History: compression fx T12, L1 & L4  2024  - chronic, compression deformities T11/L2/L3/L5   anxiety ,multiple myeloma    Precautions:  Fall Risk, O2     Assessment of current deficits    [x] Functional mobility  [x]ADLs  [x] Strength               [x]Cognition    [x] Functional transfers   [x] IADLs         [x] Safety Awareness   [x]Endurance    [] Fine Coordination              [x] Balance      [] Vision/perception   []Sensation     []Gross Motor Coordination  [] ROM  [] Delirium                   [] Motor Control     OT PLAN OF CARE   OT POC based on physician orders, patient diagnosis and results of clinical assessment    Frequency/Duration  2-4 days/wk for 2 - 4weeks PRN   Specific OT Treatment Interventions to include:   ADL retraining/adapted techniques and AE recommendations to increase functional independence within precautions                    Energy conservation techniques to improve tolerance for selfcare routine   Functional transfer/mobility training/DME recommendations for increased independence, safety and fall prevention         Patient/family education to increase safety and functional independence             Environmental modifications for safe mobility and completion of ADLs                             Therapeutic activity to improve functional

## 2024-06-10 NOTE — PROGRESS NOTES
2000 - NP notified pt becoming increasingly agitated and confused. PRN IM vistaril ordered/given. Sitter at bedside.   BiPAP in use.    2200 - Pt has been aggressively removing the BiPAP about every 30 minutes.   Often needs to be re-directed and educated about the importance of wearing it since this evening he has been especially wheezy and short of breath.   Usually is agreeable to putting it back on.  Vital signs stable, HR 110s

## 2024-06-10 NOTE — PROGRESS NOTES
[facility]  [address], [address]  [place] [address]      Pulmonary Progress Note      SUBJECTIVE:  increased issues with anxiety yesterday, currently lying down in bed getting neb treatments, had NIPPV last night. Feels some better with breathing and coughing today, still with some mod sputum production, hard of hearing, tolerating neb treatments and doesn't think causing any increased anxiety.    OBJECTIVE    Medications    Continuous Infusions:   sodium chloride         Scheduled Meds:   methylPREDNISolone  40 mg IntraVENous Q8H    cefepime  2,000 mg IntraVENous Q12H    digoxin  125 mcg Oral Daily    folic acid  1 mg Oral Daily    sodium chloride flush  5-40 mL IntraVENous 2 times per day    enoxaparin  40 mg SubCUTAneous Daily    ipratropium 0.5 mg-albuterol 2.5 mg  1 Dose Inhalation Q4H WA RT       PRN Meds:ipratropium 0.5 mg-albuterol 2.5 mg, morphine, hydrOXYzine, hydrOXYzine pamoate, sodium chloride flush, sodium chloride, ondansetron **OR** ondansetron, magnesium hydroxide, acetaminophen **OR** acetaminophen    Physical    VITALS:  /78   Pulse 100   Temp 98.3 °F (36.8 °C) (Oral)   Resp 20   Ht 1.753 m (5' 9\")   Wt 65.6 kg (144 lb 11.2 oz)   SpO2 94%   BMI 21.37 kg/m²     24HR INTAKE/OUTPUT:      Intake/Output Summary (Last 24 hours) at 6/10/2024 0853  Last data filed at 6/10/2024 0800  Gross per 24 hour   Intake 5 ml   Output 300 ml   Net -295 ml       24HR PULSE OXIMETRY RANGE:    SpO2  Av.8 %  Min: 88 %  Max: 97 %    General appearance: alert, appears stated age, and cooperative  Lungs: rhonchi bilaterally with cough  Heart: regular rate and rhythm, S1, S2 normal, no murmur, click, rub or gallop  Abdomen: soft, non-tender; bowel sounds normal; no masses,  no organomegaly  Extremities: extremities normal, atraumatic, no cyanosis or edema  Neurologic: Mental status: Alert, oriented, thought content

## 2024-06-10 NOTE — CARE COORDINATION
Hospitalist Progress Note       Name: Jules Francisco MRN: [id number]  : 1947  Age:73 y.o.  female  Admit Date:  2/15/2021 LOS: 6    Reason for Admission:  Acute respiratory failure with hypoxemia (HCC) [J96.01]  Congestive heart failure (CHF) ([facility] [I50.9]  Acute on chronic combined systolic and diastolic congestive heart failure ([facility] [I50.43]  Acute respiratory failure with hypoxia ([facility] [J96.01]    Hospital Course:  Please refer to the admission H&P for details of presentation. In summary, Jules Francisco is a 68 y.o. female with medical history significant for   CAD s/p stents x3, DMII, CKD stage 3, COVID diagnosed last month not a candidate for remdesivir or plasma who presented shortness of breathe and edema to LE bilaterally. CT chest with contrast showed No evidence of a pulmonary embolism. Findings support heart failure with cardiomegaly, pleural effusions, basilar atelectasis, and pulmonary interstitial edema. CT abdomen pelvis this was done due to patient's ongoing and worsening lower abdominal pain. CT shows colitis and has been started on antibiotics. GI was consulted as per request of the patient and ongoing abdominal pain. Subjective/24 hr Events (21) : Patient is seen and examined at bedside. No acute events reported overnight by nursing staff. Patient reports no longer having any nausea or vomiting and abdominal pain has resolved. However, patient is having multiple loose bowel movements since after taking MiraLAX. Reports that she is tolerating soft GI diet. He has been weaned to room air. RT eval for walk test showed that she does not need any O2 on ambulation. Patient was afebrile 100.6 °F early this morning. She denies any, shortness of breath, chest pains, nausea, vomiting, abdominal pain. ROS: 10 point review of systems is otherwise negative with the exception of the elements mentioned above.     Objective:    Patient Vitals for the past Social Work / Discharge PLanning :Patient admitted from [facility]. SW noted consult for SNF choice and also updated GF Eylsia stating patient wants another SNF. BILLIE attempted to meet with patient but sleeping with Cpap. Call placed to first contact Elysia to discuss SNF choices and message left [phone]. Await response. Pre-cert will be needed. Await plan. SW to follow. Electronically signed by SENDY Hernandez on 6/10/24 at 10:07 AM EDT    24 hrs:   Temp Pulse Resp BP SpO2   02/21/21 1158 98.9 °F (37.2 °C)  18 (!) 126/52 96 %   02/21/21 0737 99 °F (37.2 °C)  16 133/68 98 %   02/21/21 0344 98.3 °F (36.8 °C) 88 18 133/61 99 %   02/20/21 2312 100.2 °F (37.9 °C) 84 19 (!) 124/55 95 %   02/20/21 2002 100.4 °F (38 °C) 97 19 (!) 136/50 95 %   02/20/21 1631 99.1 °F (37.3 °C) 93 19 (!) 130/52 96 %   02/20/21 1243     95 %     Oxygen Therapy  O2 Sat (%): 96 % (02/21/21 1158)  Pulse via Oximetry: 88 beats per minute (02/15/21 1659)  O2 Device: Room air (02/20/21 1600)  O2 Flow Rate (L/min): 1 l/min(decreased from 2 to 1 L, RN made aware) (02/18/21 0906)  ETCO2 (mmHg): 95 mmHg (02/16/21 1955)    Body mass index is 28.73 kg/m². Physical Exam:   General:     alert, awake, no acute distress. Well nourished   Head:   normocephalic, atraumatic  Eyes, Ears, nose: PERRL, EOMI. Normal conjunctiva  Neck:    supple, non-tender. Trachea midline. Lungs:   Scattered crackles, no wheezing  Cardiac:   RRR, Normal S1 and S2. Abdomen:   Soft, non distended, nontender, +BS  Extremities:   Warm, dry. No edema   Skin:   No rashes, no jaundice  Neuro:  AAOx3.  No gross focal neurological deficit  Psychiatric:  No anxiety, calm, cooperative    Data Review:  I have reviewed all labs, meds, and studies from the last 24 hours:      Labs:    Recent Results (from the past 24 hour(s))   GLUCOSE, POC    Collection Time: 02/20/21  4:43 PM   Result Value Ref Range    Glucose (POC) 235 (H) 65 - 100 mg/dL   GLUCOSE, POC    Collection Time: 02/20/21  8:00 PM   Result Value Ref Range    Glucose (POC) 221 (H) 65 - 100 mg/dL   GLUCOSE, POC    Collection Time: 02/21/21  7:09 AM   Result Value Ref Range    Glucose (POC) 167 (H) 65 - 100 mg/dL   MAGNESIUM    Collection Time: 02/21/21  8:05 AM   Result Value Ref Range    Magnesium 2.2 1.8 - 2.4 mg/dL   GLUCOSE, POC    Collection Time: 02/21/21 11:24 AM   Result Value Ref Range    Glucose (POC) 235 (H) 65 - 100 mg/dL         All Micro Results None          Current Meds:  Current Facility-Administered Medications   Medication Dose Route Frequency    carvediloL (COREG) tablet 3.125 mg  3.125 mg Oral BID WITH MEALS    diphenoxylate-atropine (LOMOTIL) tablet 1 Tab  1 Tab Oral QID PRN    dicyclomine (BENTYL) capsule 10 mg  10 mg Oral QID    ciprofloxacin (CIPRO) 400 mg in D5W IVPB (premix)  400 mg IntraVENous Q24H    metroNIDAZOLE (FLAGYL) IVPB premix 500 mg  500 mg IntraVENous Q12H    HYDROmorphone (PF) (DILAUDID) injection 1 mg  1 mg IntraVENous Q6H PRN    simethicone (MYLICON) tablet 80 mg  80 mg Oral QID PRN    [Held by provider] isosorbide mononitrate ER (IMDUR) tablet 60 mg  60 mg Oral DAILY    insulin lispro (HUMALOG) injection 0-10 Units  0-10 Units SubCUTAneous AC&HS    sodium chloride (NS) flush 5-40 mL  5-40 mL IntraVENous Q8H    sodium chloride (NS) flush 5-40 mL  5-40 mL IntraVENous PRN    acetaminophen (TYLENOL) tablet 650 mg  650 mg Oral Q6H PRN    Or    acetaminophen (TYLENOL) suppository 650 mg  650 mg Rectal Q6H PRN    polyethylene glycol (MIRALAX) packet 17 g  17 g Oral DAILY PRN    promethazine (PHENERGAN) tablet 12.5 mg  12.5 mg Oral Q6H PRN    Or    ondansetron (ZOFRAN) injection 4 mg  4 mg IntraVENous Q6H PRN    enoxaparin (LOVENOX) injection 40 mg  40 mg SubCUTAneous DAILY    aspirin chewable tablet 81 mg  81 mg Oral 7am    clopidogreL (PLAVIX) tablet 75 mg  75 mg Oral DAILY    insulin glargine (LANTUS) injection 8 Units  8 Units SubCUTAneous QHS    NIFEdipine ER (PROCARDIA XL) tablet 90 mg  90 mg Oral DAILY    rosuvastatin (CRESTOR) tablet 40 mg  40 mg Oral QHS         Other Studies:  Ct Chest W Cont    Result Date: 2/15/2021  EXAMINATION: CT CHEST W CONT 2/15/2021 1:55 PM ACCESSION NUMBER: [id number] INDICATION: Shortness of breath COMPARISON: 08/10/2020 TECHNIQUE: Multiple contiguous axial CT images of the chest were obtained from the lung apices to the lung bases after the intravenous administration of 75 mL Isovue-370 contrast material . Radiation dose reduction techniques were used for this study:  Our CT scanners use one or all of the following: Automated exposure control, adjustment of the mA and/or kVp according to patient's size, iterative reconstruction. FINDINGS: Lungs: There is a small left pleural effusion with a small to moderate right pleural effusion. Atelectasis is seen in both lung bases. There is mild diffuse increased attenuation throughout the lungs. There is no suspicious nodule. Airways are patent. Opacification of the pulmonary arteries is excellent. There is no evidence of a pulmonary embolism. Mediastinum: Heart size is enlarged. Atherosclerosis is seen in the aorta and its major branches including the coronary arteries. There shotty paratracheal adenopathy. This is unchanged from the comparison exam. Visualized upper abdomen: The liver has some nodularity to its serosal surface. There is no focal suspicious lesion. The adrenal glands are normal. Osseous structures: Old fractures are seen anteriorly in the right mid ribs. There is no suspicious lytic or blastic lesion. 1. No evidence of a pulmonary embolism. 2. Findings support heart failure with cardiomegaly, pleural effusions, basilar atelectasis, and pulmonary interstitial edema. 3. Cirrhosis. 4. Atherosclerosis including coronary atherosclerosis. Xr Chest Port    Result Date: 2/15/2021  EXAMINATION: CHEST RADIOGRAPH 2/15/2021 10:22 AM ACCESSION NUMBER: [id number] COMPARISON: 02/09/2021 INDICATION: Chest pain with shortness of breath TECHNIQUE: A single view of the chest was obtained. FINDINGS: Support Devices: None Lungs: There is persistent pulmonary vascular indistinctness with small pleural effusions. This is similar to the prior exam. Cardiac Silhouette: Within normal limits in size. Mediastinum: Calcifications are seen in the aorta. Upper Abdomen: Normal Miscellaneous: There is previous fusion in the lower cervical spine.  This is partially visualized. 1. Persistent pulmonary edema 2. Atherosclerosis          Assessment:  Hospital Problems as of 2/21/2021 Date Reviewed: 2/20/2021          Codes Class Noted - Resolved POA    * (Principal) Acute on chronic combined systolic and diastolic congestive heart failure ([facility] ICD-10-CM: I50.43  ICD-9-CM: 428.43, 428.0  2/17/2021 - Present Unknown        Acute colitis ICD-10-CM: K52.9  ICD-9-CM: 558.9  2/18/2021 - Present Unknown        RESOLVED: Acute respiratory failure with hypoxia (HCC) ICD-10-CM: J96.01  ICD-9-CM: 518.81  2/17/2021 - 2/21/2021 Unknown        Acute renal failure superimposed on stage 3b chronic kidney disease ([facility] ICD-10-CM: N17.9, N18.32  ICD-9-CM: 584.9, 585.3  1/12/2021 - Present Yes        Hypoalbuminemia ICD-10-CM: E88.09  ICD-9-CM: 273.8  2/18/2021 - Present Unknown        Congestive heart failure (CHF) ([facility] ICD-10-CM: I50.9  ICD-9-CM: 428.0  2/15/2021 - Present         Mixed hyperlipidemia ICD-10-CM: E78.2  ICD-9-CM: 272.2  10/21/2020 - Present Yes        S/P PTCA (percutaneous transluminal coronary angioplasty) ICD-10-CM: Z98.61  ICD-9-CM: V45.82  9/1/2020 - Present Yes        Type 2 diabetes mellitus with hyperglycemia, without long-term current use of insulin (HCC) ICD-10-CM: E11.65  ICD-9-CM: 250.00, 790.29  10/11/2018 - Present Yes        Gastroesophageal reflux disease ICD-10-CM: K21.9  ICD-9-CM: 530.81  2/16/2017 - Present Yes        Essential hypertension (Chronic) ICD-10-CM: I10  ICD-9-CM: 401.9  9/15/2016 - Present Yes        Chronic kidney disease, stage III (moderate) ([facility] ICD-10-CM: N18.30  ICD-9-CM: 585.3  9/15/2016 - Present Yes        Coronary artery disease involving native coronary artery of native heart without angina pectoris ICD-10-CM: I25.10  ICD-9-CM: 414.01  9/4/2013 - Present Yes              Plan:    Acute Colitis  Noted in CT A/P.   Febrile episode overnight with fever 100.6 °F.  cipro and flagyl (2/18 - 2/24)  Lilia Ewing apprecaited  Symptomatic management: pain control, lomotil    Acute respiratory failure with hypoxia likely due to acute on chronic systolic CHF (resolved)  Echo on 2/16 shows EF 55 to 83% with diastolic dysfunction. No oxygen needed on ambulation or at rest.  hold lasix due to worsening renal function      MIRANDA CKD stage 3   Worsening kidney function today with creatinine increased to 2.23.   Hold Lasix  We will give trial of low IVF for 5 hours. CAD s/p 3 Stents // HTN // HLD : Stable. Cardiology recommendations appreciated. Medications have been adjusted by cardio. T2DM: sliding scale and lantus 8u qHS      Diet:  DIET GI SOFT  DVT PPx: lovenox  Code: DNR    Dispo: home  Estimated Discharge: 24-48hrs as patient had temperature spike of 100.6 °F overnight. Also having diarrhea. Labs/Imaging Reviewed. Plan discussed with staff, patient/family and are in agreement. Part of this note was written by using a voice dictation software and the note has been proof read but may still contain some grammatical/other typographical errors.     Signed By: Isaura Griffith MD     February 21, 2021

## 2024-06-10 NOTE — PROGRESS NOTES
While rounding this  found the patient to be asleep and prayer was offered for the patient.  remains available for support.

## 2024-06-10 NOTE — PROGRESS NOTES
Dr. Abbott notified unit. New orders received for Pulmicort 0.5 mg BID. See orders, See MAR.   169.9

## 2024-06-10 NOTE — PROGRESS NOTES
Physical Therapy  Facility/Department: [address] INTERMEDIATE  Physical Therapy Initial Assessment    Name: Derek Alvarado  : 1960  MRN: [id number]  Date of Service: 6/10/2024          Patient Diagnosis(es): The primary encounter diagnosis was Hypoxia. A diagnosis of Dyspnea and respiratory abnormalities was also pertinent to this visit.  Past Medical History:  has a past medical history of Anxiety.  Past Surgical History:  has no past surgical history on file.         Requires PT Follow-Up: Yes     Evaluating Therapist: Michelle Francois PT     Referring Provider:  Lexii Dueñas APRN - CNP     PT order : PT eval and treat     Room #: 639  DIAGNOSIS: The primary encounter diagnosis was Hypoxia. A diagnosis of Dyspnea and respiratory abnormalities was also pertinent to this visit.    PRECAUTIONS: falls, O2 , [place]     Social:  Pt lives with artur   in a  1  floor plan  trailer , 3  steps and  1  rails to enter.  Admitted from SNF   Prior to admission pt walked with  ww     Initial Evaluation  Date: 6/10/2024  Treatment      Short Term/ Long Term   Goals   Was pt agreeable to Eval/treatment?  Yes      Does pt have pain?  Mid back pain      Bed Mobility  Rolling:  max assist   Supine to sit:  max assist   Sit to supine:  max assist   Scooting:  max assist x 2    Mod assist    Transfers Sit to stand: max assist   Stand to sit:  max assist   Stand pivot: NT    Mod assist    Ambulation   NT  50   feet with ww  with  mod assist        Stair negotiation: ascended and descended Nt   TBA    LE ROM  AAROM WFL      LE strength  3-/ 5 B hips, 4-/ 5 distally   Increase by 1-2/ 3 MMT grade    AM- PAC RAW score   10/ 24            Pt is alert and Oriented x  3     Balance: max assist in stand . Fall risk due to [x]Decreased strength, [x] Decreased balance, [] Decreased safety awareness, [] Other :     Endurance: poor   Bed/Chair alarm:  yes      ASSESSMENT  Pt displays functional ability as noted in the objective portion of this  evaluation.        Conditions Requiring Skilled Therapeutic Intervention:    [x]Decreased strength     []Decreased ROM  [x]Decreased functional mobility  [x]Decreased balance   [x]Decreased endurance   []Decreased posture  []Decreased sensation  []Decreased coordination   []Decreased vision  []Decreased safety awareness   [x]Increased pain         Comments:   Pt  in bed  upon arrival ; agreeable to PT. Mobility as above.  Limited mobility tolerance. Poor standing tolerance, unable to walk   RN approved treatment and for pt to come off Cpap. O2@ 2 LNC . SpO with mobility 97%    Treatment:  Pt was instructed on the following :   -Bed mobility : including sequencing and technique  -Transfers: hand placement, controlled movement with stand to sit. Posture in stand            Pt educated on fall risk,  LE APs, QS, and GS        Patient response to education:   Pt verbalized understanding Pt demonstrated skill Pt requires further education in this area   x  With cues   x       Comments:  Pt left  in  bed after session, with call light in reach.      Rehab potential is Good for reaching above PT goals.    Pt’s/ family goals   1.  Increase strength     Patient and or family understand(s) diagnosis, prognosis, and plan of care. -  yes     PLAN  PT care will be provided in accordance with the objectives noted above.  Whenever appropriate, clear delegation orders will be provided for nursing staff.  Exercises and functional mobility practice will be used as well as appropriate assistive devices or modalities to obtain goals. Patient and family education will also be administered as needed.        PLAN OF CARE:    Current Treatment Recommendations     [x] Strengthening to improve independence with functional mobility   [x] ROM to improve independence with functional mobility   [x] Balance Training to improve static/dynamic balance and to reduce fall risk  [x] Endurance Training to improve activity tolerance during functional

## 2024-06-10 NOTE — PROGRESS NOTES
[facility] Quality Flow/Interdisciplinary Rounds Progress Note        Quality Flow Rounds held on Suha 10, 2024    Disciplines Attending:  Bedside Nurse, , , and Nursing Unit Leadership    Derek Alvarado was admitted on 6/8/2024  8:15 AM    Anticipated Discharge Date:   tbd    Disposition: SNF    Tip Score:  Tip Scale Score: 16    Readmission Risk              Risk of Unplanned Readmission:  19           Discussed patient goal for the day, patient clinical progression, and barriers to discharge.  The following Goal(s) of the Day/Commitment(s) have been identified:   iv cefepime and solumedrol      Sheila Norman RN  Suha 10, 2024

## 2024-06-10 NOTE — PROGRESS NOTES
Date: 6/9/2024    Time: 10:34 PM    Patient Placed On BIPAP/CPAP/ Non-Invasive Ventilation?  Yes    If no must comment.  Facial area red/color change? No           If YES are Blister/Lesion present?No   If yes must notify nursing staff  BIPAP/CPAP skin barrier?  Yes    Skin barrier type:mepilexlite     Comments:     06/09/24 2234   NIV Type   NIV Started/Stopped On   Equipment Type v60   Mode Bilevel   Mask Type Full face mask   Mask Size Medium   Assessment   Pulse (!) 133   Respirations 30   SpO2 93 %   Level of Consciousness 1.0   Comfort Level Good   Using Accessory Muscles No   Mask Compliance Good   Skin Assessment Clean, dry, & intact   Skin Protection for O2 Device Yes   Orientation Middle   Location Nose   Intervention(s) Skin Barrier   Settings/Measurements   PIP Observed 12 cm H20   IPAP 12 cmH20   CPAP/EPAP 6 cmH2O   Vt (Measured) 728 mL   Rate Ordered 12   Insp Rise Time (%) 2 %   FiO2  40 %   I Time/ I Time % 0.6 s   Minute Volume (L/min) 22.7 Liters   Mask Leak (lpm) 48 lpm   Patient's Home Machine No   Alarm Settings   Alarms On Y   Low Pressure (cmH2O) 6 cmH2O   High Pressure (cmH2O) 30 cmH2O   Apnea (secs) 20 secs   RR Low (bpm) 12   RR High (bpm) 50 br/min   Patient Observation   Observations red outlet, red cord in wall alarm       Nayely Mittal RCP

## 2024-06-11 ENCOUNTER — APPOINTMENT (OUTPATIENT)
Dept: GENERAL RADIOLOGY | Age: 64
End: 2024-06-11
Payer: MEDICAID

## 2024-06-11 LAB
ANION GAP SERPL CALCULATED.3IONS-SCNC: 5 MMOL/L (ref 7–16)
BASOPHILS # BLD: 0 K/UL (ref 0–0.2)
BASOPHILS NFR BLD: 0 % (ref 0–2)
BUN SERPL-MCNC: 38 MG/DL (ref 6–23)
CALCIUM SERPL-MCNC: 9 MG/DL (ref 8.6–10.2)
CHLORIDE SERPL-SCNC: 108 MMOL/L (ref 98–107)
CO2 SERPL-SCNC: 25 MMOL/L (ref 22–29)
CREAT SERPL-MCNC: 1.2 MG/DL (ref 0.7–1.2)
EKG ATRIAL RATE: 125 BPM
EKG ATRIAL RATE: 137 BPM
EKG P AXIS: 24 DEGREES
EKG P AXIS: 27 DEGREES
EKG P-R INTERVAL: 120 MS
EKG P-R INTERVAL: 126 MS
EKG Q-T INTERVAL: 300 MS
EKG Q-T INTERVAL: 318 MS
EKG QRS DURATION: 76 MS
EKG QRS DURATION: 84 MS
EKG QTC CALCULATION (BAZETT): 453 MS
EKG QTC CALCULATION (BAZETT): 458 MS
EKG R AXIS: 11 DEGREES
EKG R AXIS: 12 DEGREES
EKG T AXIS: 13 DEGREES
EKG T AXIS: 39 DEGREES
EKG VENTRICULAR RATE: 125 BPM
EKG VENTRICULAR RATE: 137 BPM
EOSINOPHIL # BLD: 0 K/UL (ref 0.05–0.5)
EOSINOPHILS RELATIVE PERCENT: 0 % (ref 0–6)
ERYTHROCYTE [DISTWIDTH] IN BLOOD BY AUTOMATED COUNT: 16 % (ref 11.5–15)
GFR, ESTIMATED: 70 ML/MIN/1.73M2
GLUCOSE SERPL-MCNC: 133 MG/DL (ref 74–99)
HCT VFR BLD AUTO: 22.4 % (ref 37–54)
HGB BLD-MCNC: 7.2 G/DL (ref 12.5–16.5)
LYMPHOCYTES NFR BLD: 0.38 K/UL (ref 1.5–4)
LYMPHOCYTES RELATIVE PERCENT: 4 % (ref 20–42)
MCH RBC QN AUTO: 32.6 PG (ref 26–35)
MCHC RBC AUTO-ENTMCNC: 32.1 G/DL (ref 32–34.5)
MCV RBC AUTO: 101.4 FL (ref 80–99.9)
MONOCYTES NFR BLD: 0.31 K/UL (ref 0.1–0.95)
MONOCYTES NFR BLD: 4 % (ref 2–12)
NEUTROPHILS NFR BLD: 92 % (ref 43–80)
NEUTS SEG NFR BLD: 8.01 K/UL (ref 1.8–7.3)
NUCLEATED RED BLOOD CELLS: 1 PER 100 WBC
PLATELET # BLD AUTO: 147 K/UL (ref 130–450)
PMV BLD AUTO: 9.7 FL (ref 7–12)
POTASSIUM SERPL-SCNC: 4 MMOL/L (ref 3.5–5)
RBC # BLD AUTO: 2.21 M/UL (ref 3.8–5.8)
RBC # BLD: ABNORMAL 10*6/UL
SODIUM SERPL-SCNC: 138 MMOL/L (ref 132–146)
WBC OTHER # BLD: 8.7 K/UL (ref 4.5–11.5)

## 2024-06-11 PROCEDURE — 6370000000 HC RX 637 (ALT 250 FOR IP): Performed by: FAMILY MEDICINE

## 2024-06-11 PROCEDURE — 6360000002 HC RX W HCPCS: Performed by: INTERNAL MEDICINE

## 2024-06-11 PROCEDURE — 92526 ORAL FUNCTION THERAPY: CPT

## 2024-06-11 PROCEDURE — 6360000002 HC RX W HCPCS: Performed by: FAMILY MEDICINE

## 2024-06-11 PROCEDURE — 94640 AIRWAY INHALATION TREATMENT: CPT

## 2024-06-11 PROCEDURE — 93010 ELECTROCARDIOGRAM REPORT: CPT | Performed by: INTERNAL MEDICINE

## 2024-06-11 PROCEDURE — 6370000000 HC RX 637 (ALT 250 FOR IP): Performed by: INTERNAL MEDICINE

## 2024-06-11 PROCEDURE — 2580000003 HC RX 258: Performed by: INTERNAL MEDICINE

## 2024-06-11 PROCEDURE — 94660 CPAP INITIATION&MGMT: CPT

## 2024-06-11 PROCEDURE — 99255 IP/OBS CONSLTJ NEW/EST HI 80: CPT | Performed by: STUDENT IN AN ORGANIZED HEALTH CARE EDUCATION/TRAINING PROGRAM

## 2024-06-11 PROCEDURE — 80048 BASIC METABOLIC PNL TOTAL CA: CPT

## 2024-06-11 PROCEDURE — 2580000003 HC RX 258: Performed by: FAMILY MEDICINE

## 2024-06-11 PROCEDURE — 2580000003 HC RX 258

## 2024-06-11 PROCEDURE — 85025 COMPLETE CBC W/AUTO DIFF WBC: CPT

## 2024-06-11 PROCEDURE — 6360000002 HC RX W HCPCS

## 2024-06-11 PROCEDURE — 2060000000 HC ICU INTERMEDIATE R&B

## 2024-06-11 PROCEDURE — 92611 MOTION FLUOROSCOPY/SWALLOW: CPT

## 2024-06-11 PROCEDURE — 74230 X-RAY XM SWLNG FUNCJ C+: CPT

## 2024-06-11 PROCEDURE — 2500000003 HC RX 250 WO HCPCS: Performed by: RADIOLOGY

## 2024-06-11 RX ORDER — METHYLPREDNISOLONE SODIUM SUCCINATE 40 MG/ML
40 INJECTION, POWDER, LYOPHILIZED, FOR SOLUTION INTRAMUSCULAR; INTRAVENOUS EVERY 8 HOURS
Status: DISCONTINUED | OUTPATIENT
Start: 2024-06-11 | End: 2024-06-12

## 2024-06-11 RX ORDER — WATER 10 ML/10ML
INJECTION INTRAMUSCULAR; INTRAVENOUS; SUBCUTANEOUS
Status: COMPLETED
Start: 2024-06-11 | End: 2024-06-11

## 2024-06-11 RX ADMIN — DIGOXIN 125 MCG: 0.12 TABLET ORAL at 09:38

## 2024-06-11 RX ADMIN — BUDESONIDE INHALATION 500 MCG: 0.5 SUSPENSION RESPIRATORY (INHALATION) at 19:52

## 2024-06-11 RX ADMIN — SODIUM CHLORIDE, PRESERVATIVE FREE 10 ML: 5 INJECTION INTRAVENOUS at 21:02

## 2024-06-11 RX ADMIN — HYDROXYZINE PAMOATE 25 MG: 25 CAPSULE ORAL at 21:02

## 2024-06-11 RX ADMIN — SODIUM CHLORIDE 25 ML: 9 INJECTION, SOLUTION INTRAVENOUS at 11:42

## 2024-06-11 RX ADMIN — METHYLPREDNISOLONE SODIUM SUCCINATE 40 MG: 40 INJECTION INTRAMUSCULAR; INTRAVENOUS at 03:48

## 2024-06-11 RX ADMIN — WATER 1 ML: 1 INJECTION INTRAMUSCULAR; INTRAVENOUS; SUBCUTANEOUS at 03:48

## 2024-06-11 RX ADMIN — METHYLPREDNISOLONE SODIUM SUCCINATE 40 MG: 40 INJECTION INTRAMUSCULAR; INTRAVENOUS at 21:01

## 2024-06-11 RX ADMIN — IPRATROPIUM BROMIDE AND ALBUTEROL SULFATE 1 DOSE: 2.5; .5 SOLUTION RESPIRATORY (INHALATION) at 12:36

## 2024-06-11 RX ADMIN — IPRATROPIUM BROMIDE AND ALBUTEROL SULFATE 1 DOSE: 2.5; .5 SOLUTION RESPIRATORY (INHALATION) at 16:11

## 2024-06-11 RX ADMIN — CEFEPIME 2000 MG: 2 INJECTION, POWDER, FOR SOLUTION INTRAVENOUS at 01:29

## 2024-06-11 RX ADMIN — SODIUM CHLORIDE, PRESERVATIVE FREE 10 ML: 5 INJECTION INTRAVENOUS at 09:38

## 2024-06-11 RX ADMIN — ENOXAPARIN SODIUM 40 MG: 100 INJECTION SUBCUTANEOUS at 09:38

## 2024-06-11 RX ADMIN — FOLIC ACID 1 MG: 1 TABLET ORAL at 09:38

## 2024-06-11 RX ADMIN — WATER 1 ML: 1 INJECTION INTRAMUSCULAR; INTRAVENOUS; SUBCUTANEOUS at 21:01

## 2024-06-11 RX ADMIN — METHYLPREDNISOLONE SODIUM SUCCINATE 40 MG: 40 INJECTION INTRAMUSCULAR; INTRAVENOUS at 11:46

## 2024-06-11 RX ADMIN — BUDESONIDE INHALATION 500 MCG: 0.5 SUSPENSION RESPIRATORY (INHALATION) at 09:00

## 2024-06-11 RX ADMIN — IPRATROPIUM BROMIDE AND ALBUTEROL SULFATE 1 DOSE: 2.5; .5 SOLUTION RESPIRATORY (INHALATION) at 09:00

## 2024-06-11 RX ADMIN — CEFEPIME 2000 MG: 2 INJECTION, POWDER, FOR SOLUTION INTRAVENOUS at 11:46

## 2024-06-11 RX ADMIN — BARIUM SULFATE 10 ML: 400 PASTE ORAL at 14:02

## 2024-06-11 RX ADMIN — BARIUM SULFATE 70 G: 0.81 POWDER, FOR SUSPENSION ORAL at 14:02

## 2024-06-11 RX ADMIN — IPRATROPIUM BROMIDE AND ALBUTEROL SULFATE 1 DOSE: 2.5; .5 SOLUTION RESPIRATORY (INHALATION) at 19:52

## 2024-06-11 ASSESSMENT — ENCOUNTER SYMPTOMS
BACK PAIN: 1
COUGH: 1
GASTROINTESTINAL NEGATIVE: 1
SHORTNESS OF BREATH: 0

## 2024-06-11 ASSESSMENT — PAIN SCALES - GENERAL: PAINLEVEL_OUTOF10: 0

## 2024-06-11 NOTE — PROGRESS NOTES
[facility] Quality Flow/Interdisciplinary Rounds Progress Note        Quality Flow Rounds held on June 11, 2024    Disciplines Attending:  Bedside Nurse, , , and Nursing Unit Leadership    Derek Alvarado was admitted on 6/8/2024  8:15 AM    Anticipated Discharge Date:   tbd    Disposition: home    Tip Score:  Tip Scale Score: 16    Readmission Risk              Risk of Unplanned Readmission:  17           Discussed patient goal for the day, patient clinical progression, and barriers to discharge.  The following Goal(s) of the Day/Commitment(s) have been identified:   pulm plan, continue iv steroids, continue iv abx      Sheila Norman RN  June 11, 2024

## 2024-06-11 NOTE — CONSULTS
[facility]    Inpatient Medical Oncology/Hematology Consult Note      Patient Name: Derek Alvarado  YOB: 1960    DATE OF ADMISSION: 6/8/2024  DATE OF CONSULTATION: 6/11/2024  CONSULTING PROVIDER: Claire Ko MD  REASON FOR CONSULTATION: \"pt known to you\"  PCP: John Nuno    Room: [address] [place] [address]-A      CHIEF COMPLAINT:  Shortness of breath    HISTORY OF PRESENT ILLNESS (6/11/2024):   Patient is a 64 y.o. male comes in for respiratory failure. He was seen recently by our service last month due to concern for multiple in which he had completed bone marrow biopsy, which confirmed his diagnosis of multiple myeloma. Has not received word on results of bone marrow biopsy yet.     He is coming in from rehab, and admitted on 6/8/24. He is feeling better of late, appears comfortable; still has some cough. He suggests having difficulty working with PT as he was found to have bony destruction in his low back. Pain has been better controlled of late.   He is hard of hearing.   Patient's father was present on the phone with us today.           Review of Systems   Constitutional:  Positive for appetite change. Negative for fever.   HENT:  Negative.     Respiratory:  Positive for cough. Negative for shortness of breath.    Cardiovascular: Negative.    Gastrointestinal: Negative.    Genitourinary: Negative.     Musculoskeletal:  Positive for back pain.   Neurological: Negative.    Hematological: Negative.    Psychiatric/Behavioral: Negative.                    Past Medical History:   Diagnosis Date    Anxiety        No past surgical history on file.    Social History     Socioeconomic History    Marital status: Single   Tobacco Use    Smoking status: Never    Smokeless tobacco: Never   Vaping Use    Vaping Use: Never used   Substance and Sexual Activity    Alcohol use: Never    Drug use: Never     Social Determinants of Health     Financial Resource Strain: Low Risk  (1/26/2022)    Overall Financial  membranes are moist.   Eyes:      General: No scleral icterus.     Extraocular Movements: Extraocular movements intact.   Cardiovascular:      Rate and Rhythm: Normal rate and regular rhythm.   Pulmonary:      Effort: No respiratory distress.      Breath sounds: No stridor. No wheezing.   Abdominal:      General: There is no distension.      Palpations: There is no mass.      Tenderness: There is no abdominal tenderness.   Musculoskeletal:         General: No deformity.      Cervical back: Normal range of motion. No rigidity.      Right lower leg: No edema.      Left lower leg: No edema.   Skin:     Coloration: Skin is not jaundiced or pale.   Neurological:      General: No focal deficit present.      Mental Status: He is alert and oriented to person, place, and time.   Psychiatric:         Mood and Affect: Mood normal.         Behavior: Behavior normal.         Thought Content: Thought content normal.          ECOG PS: 2        No intake or output data in the 24 hours ending 06/11/24 1726        DIAGNOSTIC DATA:   Lab Results   Component Value Date    WBC 8.7 06/11/2024    HGB 7.2 (L) 06/11/2024    HCT 22.4 (L) 06/11/2024    .4 (H) 06/11/2024     06/11/2024     Lab Results   Component Value Date    NEUTROABS 8.01 (H) 06/11/2024     Lab Results   Component Value Date    ALT <5 05/31/2024    AST 10 05/31/2024    ALKPHOS 50 05/31/2024    BILITOT 0.8 05/31/2024     Lab Results   Component Value Date    CREATININE 1.2 06/11/2024    BUN 38 (H) 06/11/2024     06/11/2024    K 4.0 06/11/2024     (H) 06/11/2024    CO2 25 06/11/2024       Pathology:     Radiology:        ASSESSMENT     Multiple myeloma, IgG lambda, plasma cells ~70%  Parainfluenzae infection with hypoxic respiratory failure, improved  Spinal compression fracture(s)  Right iliac bone and left sacrum bone lesions  Anemia  Hypercalcemia improved  RAMONA improved  B12 deficiency  Folate deficiency    The patient is a 64 y.o. male who comes

## 2024-06-11 NOTE — PROGRESS NOTES
SPEECH/LANGUAGE PATHOLOGY  VIDEOFLUOROSCOPIC STUDY OF SWALLOWING (MBS)   and PLAN OF CARE    PATIENT NAME:  Derek Alvarado  (male)     MRN:  [id number]    :  1960  (64 y.o.)  STATUS:  Inpatient: Room 0639/0639-A    TODAY'S DATE:  2024  REFERRING PROVIDER:  Germania Saab APRN-CNP   SPECIFIC PROVIDER ORDER: FL modified barium swallow with video  Date of order:  24   REASON FOR REFERRAL: Assess oropharyngeal swallow function    EVALUATING THERAPIST: Yun Pa, SLP      RESULTS:      DYSPHAGIA DIAGNOSIS:  moderate oral phase dysphagia     Patient noted to be coughing throughout MBSS despite no visualized aspiration or penetration. Patient also declined trial of soft solid during MBSS. Patient with prolonged and disorganized oral manipulation of puree.     DIET RECOMMENDATIONS:  Pureed consistency solids (IDDSI level 4) with  thin liquids (IDDSI level 0)    FEEDING RECOMMENDATIONS:    Assistance level:  Set-up is required for all oral intake     Compensatory strategies recommended: Thorough oral care to prevent colonization of oral bacteria   Upright in bed/ chair as tolerated  Fully alert for all PO  SMALL bites     Discussed recommendations with:  patient nurse via phone    Laryngeal Penetration and Aspiration:  Neither penetration nor aspiration was observed in 's study     SPEECH THERAPY  PLAN OF CARE   The dysphagia POC is established based on physician order and dysphagia diagnosis    Meal time assessment for 1-2 sessions to provide diet modification and compensatory strategy implementation to safely advance diet as functional ability improves      Conditions Requiring Skilled Therapeutic Intervention for dysphagia:    Patient is performing below functional baseline d/t  current acute condition, Multiple diagnoses, multiple medications, and increased dependency upon caregivers.    SPECIFIC DYSPHAGIA INTERVENTIONS TO INCLUDE:     compensatory swallowing strategies to improve airway  observation of tasks, assessment of data and education on plan of care and goals.    [x]The admitting diagnosis and active problem list, have been reviewed prior to initiation of this evaluation.    CPT Code: 24691  dysphagia study    ACTIVE PROBLEM LIST:   Patient Active Problem List   Diagnosis    Anxiety    Essential hypertension    Hypercalcemia    Acute right-sided low back pain with sciatica    Lytic bone lesions on xray    Palliative care encounter    Goals of care, counseling/discussion    Macrocytic anemia    Multiple myeloma not having achieved remission (HCC)    Acute hypoxic respiratory failure (HCC)       INTERVENTION  Speech Pathologist (SLP) completed education with the patient/family regarding type of swallowing impairment. Reviewed current solid/liquid consistency diet recommendations and discussed compensatory strategies to ensure safe PO intake. Reviewed aspiration precautions. Encouraged patient and/or family to engage SLP in unstructured Q&A session relative to identified deficit areas; indicated understanding of all information provided via satisfactory verbal response.    CPT Code: 95939  dysphagia tx

## 2024-06-11 NOTE — PROGRESS NOTES
Pulmonary Progress Note    Admit Date: 2024  Hospital day                               PCP: John Nuno APRN - CNP    Chief Complaint (s):  Patient Active Problem List   Diagnosis    Anxiety    Essential hypertension    Hypercalcemia    Acute right-sided low back pain with sciatica    Lytic bone lesions on xray    Palliative care encounter    Goals of care, counseling/discussion    Macrocytic anemia    Multiple myeloma not having achieved remission (HCC)    Acute hypoxic respiratory failure (HCC)       Subjective:  Patient seen on room air. He admits to harsh cough that is only occasionally productive. He states \"I feel I always have stuff that needs to come up\". He admits that he eats jello and pudding the best.       Vitals:  VITALS:  BP (!) 154/85   Pulse 100   Temp 97.9 °F (36.6 °C) (Oral)   Resp 18   Ht 1.753 m (5' 9\")   Wt 65.6 kg (144 lb 11.2 oz)   SpO2 94%   BMI 21.37 kg/m²     24HR INTAKE/OUTPUT:      Intake/Output Summary (Last 24 hours) at 2024 1109  Last data filed at 6/10/2024 1724  Gross per 24 hour   Intake --   Output 450 ml   Net -450 ml       24HR PULSE OXIMETRY RANGE:    SpO2  Av.6 %  Min: 92 %  Max: 98 %    Medications:  IV:   sodium chloride         Scheduled Meds:   methylPREDNISolone  40 mg IntraVENous Q8H    budesonide  0.5 mg Nebulization BID RT    cefepime  2,000 mg IntraVENous Q12H    digoxin  125 mcg Oral Daily    folic acid  1 mg Oral Daily    sodium chloride flush  5-40 mL IntraVENous 2 times per day    enoxaparin  40 mg SubCUTAneous Daily    ipratropium 0.5 mg-albuterol 2.5 mg  1 Dose Inhalation Q4H WA RT       Diet:   ADULT DIET; Regular; Low Fat/Low Chol/High Fiber/MELISA     EXAM:  General: No distress. Alert.  Eyes: PERRL. No sclera icterus. No conjunctival injection.  ENT: No discharge. Pharynx clear.  Neck: Trachea midline. Normal thyroid.  Resp: No accessory muscle use. upper rales. no wheezing. bilateral rhonchi.   CV: Regular rate.

## 2024-06-11 NOTE — PROGRESS NOTES
[facility]   Progress note      Subjective:    Resting comfortably in bed  Denies any complaints on assessment    Objective:    /77   Pulse 95   Temp 97.7 °F (36.5 °C) (Oral)   Resp 18   Ht 1.753 m (5' 9\")   Wt 65.6 kg (144 lb 11.2 oz)   SpO2 94%   BMI 21.37 kg/m²     In: 5 [I.V.:5]  Out: 450   In: 5   Out: 450 [Urine:450]    General appearance: NAD, somnolent  HEENT: AT/NC, MMM  Neck: FROM, supple  Lungs: Diminished breath sounds, rhonchi  CV: RRR, no MRGs  Vasc: Radial pulses 2+  Abdomen: Soft, non-tender; no masses or HSM  Extremities: No peripheral edema or digital cyanosis  Skin: no rash, lesions or ulcers       Recent Labs     06/09/24  0230 06/10/24  0330 06/11/24  0318   WBC 7.1 9.6 8.7   HGB 8.3* 8.0* 7.2*   HCT 25.3* 25.7* 22.4*    179 147         Recent Labs     06/09/24  0230 06/10/24  0330 06/11/24  0318    137 138   K 4.4 4.4 4.0    105 108*   CO2 22 24 25   BUN 24* 35* 38*   CREATININE 1.2 1.4* 1.2   CALCIUM 9.3 9.4 9.0         Assessment:    Principal Problem:    Acute hypoxic respiratory failure (HCC)  Resolved Problems:    * No resolved hospital problems. *      Plan:    64-year-old male who was recently diagnosed with multiple myeloma presents to the ED with hypoxia and is admitted to telemetry unit with     Acute hypoxic respiratory failure from COPD, now exacerbated with parainfluenza 3 virus  -Supplement O2 demands keeping oxygen saturation greater than 92%.  Patient currently on room air  IV Solu-Medrol 40 mg IV every 6 hours  Pulmonology evaluation as patient known to them  Supplemental oxygenation, may require noninvasive ventilation given his current status  -Maria Elena  Respiratory panel positive for parainfluenza 3-supportive care  No immediate need for IV antibiotic therapy  Procalcitonin is 0.31     Recent diagnosis of multiple myeloma  Undergoing treatment with radiation oncology-Dr. Hayes     Tachycardia  Multifactorial from

## 2024-06-11 NOTE — PROGRESS NOTES
Physical Therapy    Pt refused Rx, states wants to talk on his phone, wants to stay in bed while doing this. Wishes respected. Will follow on another date/time.  Philip Davis PTA [id number]

## 2024-06-11 NOTE — CARE COORDINATION
Social Work / Discharge PLanning : SW followed up with patient and discussed goals at discharge. Plan is SNF.Patient would like SW to inquire at [facility]. Referral made to [facility] to see if they can accept. Await acceptance/ denial. Pre-cert will be needed. SW to follow. Electronically signed by SENDY Hernandez on 6/11/24 at 10:31 AM EDT

## 2024-06-12 ENCOUNTER — APPOINTMENT (OUTPATIENT)
Dept: GENERAL RADIOLOGY | Age: 64
End: 2024-06-12
Payer: MEDICAID

## 2024-06-12 DIAGNOSIS — C90.00 MULTIPLE MYELOMA NOT HAVING ACHIEVED REMISSION (HCC): Primary | ICD-10-CM

## 2024-06-12 LAB
ANION GAP SERPL CALCULATED.3IONS-SCNC: 8 MMOL/L (ref 7–16)
BASOPHILS # BLD: 0.01 K/UL (ref 0–0.2)
BASOPHILS NFR BLD: 0 % (ref 0–2)
BUN SERPL-MCNC: 39 MG/DL (ref 6–23)
CALCIUM SERPL-MCNC: 9.1 MG/DL (ref 8.6–10.2)
CHLORIDE SERPL-SCNC: 108 MMOL/L (ref 98–107)
CO2 SERPL-SCNC: 23 MMOL/L (ref 22–29)
CREAT SERPL-MCNC: 1.1 MG/DL (ref 0.7–1.2)
EOSINOPHIL # BLD: 0 K/UL (ref 0.05–0.5)
EOSINOPHILS RELATIVE PERCENT: 0 % (ref 0–6)
ERYTHROCYTE [DISTWIDTH] IN BLOOD BY AUTOMATED COUNT: 16.1 % (ref 11.5–15)
FERRITIN SERPL-MCNC: 2040 NG/ML
GFR, ESTIMATED: 80 ML/MIN/1.73M2
GLUCOSE SERPL-MCNC: 131 MG/DL (ref 74–99)
HCT VFR BLD AUTO: 24.1 % (ref 37–54)
HGB BLD-MCNC: 7.6 G/DL (ref 12.5–16.5)
IMM GRANULOCYTES # BLD AUTO: 0.12 K/UL (ref 0–0.58)
IMM GRANULOCYTES NFR BLD: 1 % (ref 0–5)
IRON SATN MFR SERPL: 45 % (ref 20–55)
IRON SERPL-MCNC: 89 UG/DL (ref 59–158)
LYMPHOCYTES NFR BLD: 0.46 K/UL (ref 1.5–4)
LYMPHOCYTES RELATIVE PERCENT: 5 % (ref 20–42)
MCH RBC QN AUTO: 32.9 PG (ref 26–35)
MCHC RBC AUTO-ENTMCNC: 31.5 G/DL (ref 32–34.5)
MCV RBC AUTO: 104.3 FL (ref 80–99.9)
MONOCYTES NFR BLD: 0.63 K/UL (ref 0.1–0.95)
MONOCYTES NFR BLD: 7 % (ref 2–12)
NEUTROPHILS NFR BLD: 86 % (ref 43–80)
NEUTS SEG NFR BLD: 7.47 K/UL (ref 1.8–7.3)
PLATELET # BLD AUTO: 166 K/UL (ref 130–450)
PMV BLD AUTO: 10.2 FL (ref 7–12)
POTASSIUM SERPL-SCNC: 4.5 MMOL/L (ref 3.5–5)
RBC # BLD AUTO: 2.31 M/UL (ref 3.8–5.8)
RBC # BLD: ABNORMAL 10*6/UL
SODIUM SERPL-SCNC: 139 MMOL/L (ref 132–146)
TIBC SERPL-MCNC: 197 UG/DL (ref 250–450)
WBC OTHER # BLD: 8.7 K/UL (ref 4.5–11.5)

## 2024-06-12 PROCEDURE — 94640 AIRWAY INHALATION TREATMENT: CPT

## 2024-06-12 PROCEDURE — 6370000000 HC RX 637 (ALT 250 FOR IP): Performed by: INTERNAL MEDICINE

## 2024-06-12 PROCEDURE — 6360000002 HC RX W HCPCS

## 2024-06-12 PROCEDURE — 6370000000 HC RX 637 (ALT 250 FOR IP): Performed by: FAMILY MEDICINE

## 2024-06-12 PROCEDURE — 6360000002 HC RX W HCPCS: Performed by: FAMILY MEDICINE

## 2024-06-12 PROCEDURE — 2580000003 HC RX 258: Performed by: FAMILY MEDICINE

## 2024-06-12 PROCEDURE — 92526 ORAL FUNCTION THERAPY: CPT

## 2024-06-12 PROCEDURE — 2580000003 HC RX 258: Performed by: INTERNAL MEDICINE

## 2024-06-12 PROCEDURE — 71045 X-RAY EXAM CHEST 1 VIEW: CPT

## 2024-06-12 PROCEDURE — 6360000002 HC RX W HCPCS: Performed by: INTERNAL MEDICINE

## 2024-06-12 PROCEDURE — 82728 ASSAY OF FERRITIN: CPT

## 2024-06-12 PROCEDURE — 2580000003 HC RX 258

## 2024-06-12 PROCEDURE — 80048 BASIC METABOLIC PNL TOTAL CA: CPT

## 2024-06-12 PROCEDURE — 99232 SBSQ HOSP IP/OBS MODERATE 35: CPT | Performed by: NURSE PRACTITIONER

## 2024-06-12 PROCEDURE — 85025 COMPLETE CBC W/AUTO DIFF WBC: CPT

## 2024-06-12 PROCEDURE — 1200000000 HC SEMI PRIVATE

## 2024-06-12 PROCEDURE — 36415 COLL VENOUS BLD VENIPUNCTURE: CPT

## 2024-06-12 PROCEDURE — 83550 IRON BINDING TEST: CPT

## 2024-06-12 PROCEDURE — 83540 ASSAY OF IRON: CPT

## 2024-06-12 PROCEDURE — 94660 CPAP INITIATION&MGMT: CPT

## 2024-06-12 RX ORDER — ONDANSETRON 2 MG/ML
8 INJECTION INTRAMUSCULAR; INTRAVENOUS
OUTPATIENT
Start: 2024-06-12

## 2024-06-12 RX ORDER — ACETAMINOPHEN 325 MG/1
650 TABLET ORAL
OUTPATIENT
Start: 2024-06-12

## 2024-06-12 RX ORDER — WATER 10 ML/10ML
INJECTION INTRAMUSCULAR; INTRAVENOUS; SUBCUTANEOUS
Status: COMPLETED
Start: 2024-06-12 | End: 2024-06-12

## 2024-06-12 RX ORDER — FAMOTIDINE 10 MG/ML
20 INJECTION, SOLUTION INTRAVENOUS
OUTPATIENT
Start: 2024-06-12

## 2024-06-12 RX ORDER — DIPHENHYDRAMINE HYDROCHLORIDE 50 MG/ML
50 INJECTION INTRAMUSCULAR; INTRAVENOUS
OUTPATIENT
Start: 2024-06-12

## 2024-06-12 RX ORDER — SODIUM CHLORIDE 9 MG/ML
5-250 INJECTION, SOLUTION INTRAVENOUS PRN
OUTPATIENT
Start: 2024-06-12

## 2024-06-12 RX ORDER — ONDANSETRON 2 MG/ML
8 INJECTION INTRAMUSCULAR; INTRAVENOUS ONCE
Status: CANCELLED | OUTPATIENT
Start: 2024-06-12 | End: 2024-06-12

## 2024-06-12 RX ORDER — HEPARIN SODIUM (PORCINE) LOCK FLUSH IV SOLN 100 UNIT/ML 100 UNIT/ML
500 SOLUTION INTRAVENOUS PRN
OUTPATIENT
Start: 2024-06-12

## 2024-06-12 RX ORDER — ALBUTEROL SULFATE 90 UG/1
4 AEROSOL, METERED RESPIRATORY (INHALATION) PRN
OUTPATIENT
Start: 2024-06-12

## 2024-06-12 RX ORDER — SODIUM CHLORIDE 9 MG/ML
5-250 INJECTION, SOLUTION INTRAVENOUS PRN
Status: CANCELLED | OUTPATIENT
Start: 2024-06-12

## 2024-06-12 RX ORDER — METHYLPREDNISOLONE SODIUM SUCCINATE 40 MG/ML
40 INJECTION, POWDER, LYOPHILIZED, FOR SOLUTION INTRAMUSCULAR; INTRAVENOUS EVERY 12 HOURS
Status: COMPLETED | OUTPATIENT
Start: 2024-06-12 | End: 2024-06-13

## 2024-06-12 RX ORDER — SODIUM CHLORIDE 0.9 % (FLUSH) 0.9 %
5-40 SYRINGE (ML) INJECTION PRN
Status: CANCELLED | OUTPATIENT
Start: 2024-06-12

## 2024-06-12 RX ORDER — SODIUM CHLORIDE 9 MG/ML
INJECTION, SOLUTION INTRAVENOUS CONTINUOUS
OUTPATIENT
Start: 2024-06-12

## 2024-06-12 RX ORDER — EPINEPHRINE 1 MG/ML
0.3 INJECTION, SOLUTION, CONCENTRATE INTRAVENOUS PRN
OUTPATIENT
Start: 2024-06-12

## 2024-06-12 RX ORDER — MEPERIDINE HYDROCHLORIDE 50 MG/ML
12.5 INJECTION INTRAMUSCULAR; INTRAVENOUS; SUBCUTANEOUS PRN
OUTPATIENT
Start: 2024-06-12

## 2024-06-12 RX ADMIN — SODIUM CHLORIDE 25 ML: 9 INJECTION, SOLUTION INTRAVENOUS at 15:17

## 2024-06-12 RX ADMIN — IPRATROPIUM BROMIDE AND ALBUTEROL SULFATE 1 DOSE: 2.5; .5 SOLUTION RESPIRATORY (INHALATION) at 16:05

## 2024-06-12 RX ADMIN — FOLIC ACID 1 MG: 1 TABLET ORAL at 09:17

## 2024-06-12 RX ADMIN — WATER 10 ML: 1 INJECTION INTRAMUSCULAR; INTRAVENOUS; SUBCUTANEOUS at 04:24

## 2024-06-12 RX ADMIN — IPRATROPIUM BROMIDE AND ALBUTEROL SULFATE 1 DOSE: 2.5; .5 SOLUTION RESPIRATORY (INHALATION) at 07:54

## 2024-06-12 RX ADMIN — BUDESONIDE INHALATION 500 MCG: 0.5 SUSPENSION RESPIRATORY (INHALATION) at 07:54

## 2024-06-12 RX ADMIN — SODIUM CHLORIDE, PRESERVATIVE FREE 10 ML: 5 INJECTION INTRAVENOUS at 22:19

## 2024-06-12 RX ADMIN — CEFEPIME 2000 MG: 2 INJECTION, POWDER, FOR SOLUTION INTRAVENOUS at 01:44

## 2024-06-12 RX ADMIN — IPRATROPIUM BROMIDE AND ALBUTEROL SULFATE 1 DOSE: 2.5; .5 SOLUTION RESPIRATORY (INHALATION) at 20:30

## 2024-06-12 RX ADMIN — SODIUM CHLORIDE, PRESERVATIVE FREE 10 ML: 5 INJECTION INTRAVENOUS at 09:27

## 2024-06-12 RX ADMIN — DIGOXIN 125 MCG: 0.12 TABLET ORAL at 09:17

## 2024-06-12 RX ADMIN — CEFEPIME 2000 MG: 2 INJECTION, POWDER, FOR SOLUTION INTRAVENOUS at 15:22

## 2024-06-12 RX ADMIN — ENOXAPARIN SODIUM 40 MG: 100 INJECTION SUBCUTANEOUS at 09:28

## 2024-06-12 RX ADMIN — METHYLPREDNISOLONE SODIUM SUCCINATE 40 MG: 40 INJECTION INTRAMUSCULAR; INTRAVENOUS at 04:24

## 2024-06-12 RX ADMIN — IPRATROPIUM BROMIDE AND ALBUTEROL SULFATE 1 DOSE: 2.5; .5 SOLUTION RESPIRATORY (INHALATION) at 12:03

## 2024-06-12 RX ADMIN — METHYLPREDNISOLONE SODIUM SUCCINATE 40 MG: 40 INJECTION, POWDER, LYOPHILIZED, FOR SOLUTION INTRAMUSCULAR; INTRAVENOUS at 17:45

## 2024-06-12 RX ADMIN — BUDESONIDE INHALATION 500 MCG: 0.5 SUSPENSION RESPIRATORY (INHALATION) at 20:30

## 2024-06-12 RX ADMIN — HYDROXYZINE PAMOATE 25 MG: 25 CAPSULE ORAL at 09:16

## 2024-06-12 ASSESSMENT — ENCOUNTER SYMPTOMS
COUGH: 1
GASTROINTESTINAL NEGATIVE: 1
BACK PAIN: 1
SHORTNESS OF BREATH: 0

## 2024-06-12 ASSESSMENT — PAIN SCALES - GENERAL: PAINLEVEL_OUTOF10: 0

## 2024-06-12 NOTE — PROGRESS NOTES
Occupational Therapy  Attempted therapy this PM.  Pt laying in the bed with bedpan resting at his side.  He declined therapy at this time. Will attempt another time.   Isaac GILL/ELANA [phone]

## 2024-06-12 NOTE — PROGRESS NOTES
[facility]   Progress note      Subjective:    Resting comfortably in bed  Multiple questions and concerns today  Intermittent confusion noted     Objective:    /86   Pulse 82   Temp 97.7 °F (36.5 °C) (Axillary)   Resp 20   Ht 1.753 m (5' 9\")   Wt 65.6 kg (144 lb 11.2 oz)   SpO2 92%   BMI 21.37 kg/m²     No intake/output data recorded.  No intake/output data recorded.    General appearance: NAD, somnolent  HEENT: AT/NC, MMM  Neck: FROM, supple  Lungs: Diminished breath sounds, rhonchi  CV: RRR, no MRGs  Vasc: Radial pulses 2+  Abdomen: Soft, non-tender; no masses or HSM  Extremities: No peripheral edema or digital cyanosis  Skin: no rash, lesions or ulcers  Neuro: A&Ox3, intermit confusion       Recent Labs     06/10/24  0330 06/11/24  0318 06/12/24  0724   WBC 9.6 8.7 8.7   HGB 8.0* 7.2* 7.6*   HCT 25.7* 22.4* 24.1*    147 166         Recent Labs     06/10/24  0330 06/11/24  0318 06/12/24  0724    138 139   K 4.4 4.0 4.5    108* 108*   CO2 24 25 23   BUN 35* 38* 39*   CREATININE 1.4* 1.2 1.1   CALCIUM 9.4 9.0 9.1         Assessment:    Principal Problem:    Acute hypoxic respiratory failure (HCC)  Resolved Problems:    * No resolved hospital problems. *      Plan:    64-year-old male who was recently diagnosed with multiple myeloma presents to the ED with hypoxia and is admitted to telemetry unit with     Acute hypoxic respiratory failure from COPD, now exacerbated with parainfluenza 3 virus  -Supplement O2 demands keeping oxygen saturation greater than 92%.  Patient currently on room air  IV Solu-Medrol 40 mg IV every 6 hours  Pulmonology evaluation as patient known to them  Supplemental oxygenation, may require noninvasive ventilation given his current status  -DuoNebs  Respiratory panel positive for parainfluenza 3-supportive care  No immediate need for IV antibiotic therapy  Procalcitonin is 0.31     Recent diagnosis of multiple myeloma  Undergoing treatment  with radiation oncology-Dr. Hayes     Tachycardia  Multifactorial from underlying COPD, anxiety  Telemetry monitoring  IV hydration    6/10/2024  Marked improvement in status today  Tolerating Vistaril  Sleeping on my evaluation with noninvasive ventilation in place  Blood cultures negative to date  Continue to adjust BiPAP settings at discretion of pulmonology  If recurrent agitation, can cautiously resume Ativan which apparently he takes at home regularly twice a day as needed  Heart rate better controlled  Mild renal insufficiency-IV fluid hydration-BUN/creatinine 35/1.4-continue to trend    6/11/24:  -Remains on IV Solu-Medrol 40 mg per pulmonology recommendations which has now been increased to every 8 today  -Modified barium swallow ordered following previous recommendations on prior admission recommending puréed consistency foods with thin liquids  -Oncology consult given recent bone marrow biopsy and further input  -Continue scheduled breathing treatments with IV cefepime  -Currently saturating 95% on room air    6/12/24:  -Remains on room air sating well  -IV solumedrol weaned to 40 mg q12h per pulm  -Hem/onc planning possible initiation of treatment for multiple myeloma inpatient, await further plans   -labs and vitals stable  -Rad/Onc consulted per Hem/onc   -Awaiting final plans from hem/onc to determine rehabs available to accommodate treatments       Code Status: Full code  Consultants: Pulmonology and oncology       DVT Prophylaxis   PT/OT  Discharge planning         I have spent a total time of 25 minutes of this patient encounter reviewing chart, labs, radiological reports coordinating care with interdisciplinary teams, face to face encounter with patient, providing counseling/education to patient/family, and formulating plan.       MALACHI Walker - CNP  3:01 PM  6/12/2024

## 2024-06-12 NOTE — PROGRESS NOTES
[facility]  [address], [address]  [place] [address]      Pulmonary Progress Note      SUBJECTIVE:  lying down in bed on RA, claims some better with breathing, still with cough with minimal sputum production, looking better overall, on RA since yesterday lying down in bed. No dysphagia with swallow evaluation    OBJECTIVE    Medications    Continuous Infusions:   sodium chloride 25 mL (24 1142)       Scheduled Meds:   methylPREDNISolone  40 mg IntraVENous Q8H    budesonide  0.5 mg Nebulization BID RT    cefepime  2,000 mg IntraVENous Q12H    digoxin  125 mcg Oral Daily    folic acid  1 mg Oral Daily    sodium chloride flush  5-40 mL IntraVENous 2 times per day    enoxaparin  40 mg SubCUTAneous Daily    ipratropium 0.5 mg-albuterol 2.5 mg  1 Dose Inhalation Q4H WA RT       PRN Meds:ipratropium 0.5 mg-albuterol 2.5 mg, morphine, hydrOXYzine, hydrOXYzine pamoate, sodium chloride flush, sodium chloride, ondansetron **OR** ondansetron, magnesium hydroxide, acetaminophen **OR** acetaminophen    Physical    VITALS:  BP (!) 148/84   Pulse 88   Temp 98 °F (36.7 °C) (Oral)   Resp 16   Ht 1.753 m (5' 9\")   Wt 65.6 kg (144 lb 11.2 oz)   SpO2 92%   BMI 21.37 kg/m²     24HR INTAKE/OUTPUT:    No intake or output data in the 24 hours ending 24 0813      24HR PULSE OXIMETRY RANGE:    SpO2  Av %  Min: 90 %  Max: 94 %    General appearance: alert, appears stated age, and cooperative  Lungs: rhonchi bilaterally with cough  Heart: regular rate and rhythm, S1, S2 normal, no murmur, click, rub or gallop  Abdomen: soft, non-tender; bowel sounds normal; no masses,  no organomegaly  Extremities: extremities normal, atraumatic, no cyanosis or edema  Neurologic: Mental status: Alert, oriented, thought content appropriate    Data    CBC:   Recent Labs     06/10/24  0330 24  0318   WBC 9.6 8.7   HGB 8.0* 7.2*   HCT 25.7* 22.4*   .9*  101.4*    147         BMP:  Recent Labs     06/10/24  0330 06/11/24  0318    138   K 4.4 4.0    108*   CO2 24 25   BUN 35* 38*   CREATININE 1.4* 1.2      ALB:3,BILIDIR:3,BILITOT:3,ALKPHOS:3)@    PT/INR: No results for input(s): \"PROTIME\", \"INR\" in the last 72 hours.    ABG:   Recent Labs     06/09/24  1705   PH 7.464*   PO2 65.8*   PCO2 30.5*   HCO3 21.4*   BE -1.8   O2SAT 92.0   METHB 0.3   O2HB 91.7*   COHB 0.0   O2CON 12.2   HHB 8.0*   THB 9.4*       FiO2 : 40 %       Radiology/Other tests reviewed: none    Assessment:     Principal Problem:    Acute hypoxic respiratory failure (HCC)  Resolved Problems:    * No resolved hospital problems. *  Parainfluenza 3  Hypoxia      Plan:       Cont with steroids, taper as tolerated  Cont with nebs, observe respiratory function and tolerance  Cont with oxygen, taper as tolerated  Cont with antibiotics for now, CXR today  Anxiety as per PCP, apparently has hx of this  OOB to chair, ambulate as tolerated      Time at the bedside, reviewing labs and radiographs, reviewing notes and consultations, discussing with staff and family was more than 50 minutes.      Thanks for letting us see this patient in consultation.  Please contact us with any questions. Office [phone] or after hours through [facility] [employer], [phone].

## 2024-06-12 NOTE — PROGRESS NOTES
Patient refusing turns with wedge pillows and heel protectors to elevate heels off bed. Educated patient on importance of these for preventing wounds and skin breakdown. Patient verablizes understanding. Will continue to offer and encourage turns, repositioning, and elevating feet to patient.

## 2024-06-12 NOTE — PROGRESS NOTES
[facility] Quality Flow/Interdisciplinary Rounds Progress Note        Quality Flow Rounds held on June 12, 2024    Disciplines Attending:  Bedside Nurse, , , and Nursing Unit Leadership    Derek Alvarado was admitted on 6/8/2024  8:15 AM    Anticipated Discharge Date:  Expected Discharge Date: 06/13/24    Disposition:    Tip Score:  Tip Scale Score: 16    Readmission Risk              Risk of Unplanned Readmission:  18           Discussed patient goal for the day, patient clinical progression, and barriers to discharge.  The following Goal(s) of the Day/Commitment(s) have been identified:   IV steroids, IV ABX, discharge planning, pulm plan      Sergio Castillo RN  June 12, 2024

## 2024-06-12 NOTE — PROGRESS NOTES
Physical Therapy  Facility/Department: [address] INTERMEDIATE  NAME: Derek Alvarado  : 1960  MRN: [id number]      Chart reviewed and PT treatment attempted this pm.  Pt found in bed and reported he wanted to wait to use the bed ritter.  Will check back at later time/date.     Leonarda Olson, PT [id number]

## 2024-06-12 NOTE — PROGRESS NOTES
[facility]    Inpatient Medical Oncology/Hematology Progress Note      CHIEF COMPLAINT:  Shortness of breath    HISTORY OF PRESENT ILLNESS (6/11/2024):   Patient is a 64 y.o. male comes in for respiratory failure. He has recently diagnosed multiple myeloma, but has not started treatment. Patient states today that he doesn't know if his dad wants him to have chemotherapy and he isn't sure he wants to either.     Review of Systems   Constitutional:  Positive for appetite change. Negative for fever.   HENT:  Negative.     Respiratory:  Positive for cough. Negative for shortness of breath.    Cardiovascular: Negative.    Gastrointestinal: Negative.    Genitourinary: Negative.     Musculoskeletal:  Positive for back pain.   Neurological: Negative.    Hematological: Negative.    Psychiatric/Behavioral: Negative.           Current Facility-Administered Medications   Medication Dose Route Frequency Provider Last Rate Last Admin    methylPREDNISolone sodium succ (SOLU-MEDROL) injection 40 mg  40 mg IntraVENous Q12H Armani Abbott MD        budesonide (PULMICORT) nebulizer suspension 500 mcg  0.5 mg Nebulization BID RT Armani Abbott MD   500 mcg at 06/12/24 0754    ipratropium 0.5 mg-albuterol 2.5 mg (DUONEB) nebulizer solution 1 Dose  1 Dose Inhalation Q4H PRN Lexii Dueñas APRN - CNP   1 Dose at 06/09/24 0207    ceFEPIme (MAXIPIME) 2,000 mg in sodium chloride 0.9 % 100 mL IVPB  2,000 mg IntraVENous Q12H Armani Abbott MD 25 mL/hr at 06/12/24 1522 2,000 mg at 06/12/24 1522    digoxin (LANOXIN) tablet 125 mcg  125 mcg Oral Daily Claire Ko MD   125 mcg at 06/12/24 0917    morphine (PF) injection 1 mg  1 mg IntraVENous Q4H PRN Claire Ko MD        hydrOXYzine (VISTARIL) injection 50 mg  50 mg IntraMUSCular Q6H PRN Piedad Emery APRN - CNP   50 mg at 06/10/24 0809    folic acid (FOLVITE) tablet 1 mg  1 mg Oral Daily Lexii Dueñas APRN - CNP   1 mg at 06/12/24 0917    hydrOXYzine pamoate  confirming his diagnosis of multiple myeloma.   I discussed diagnosis of MM with patient and his father today, outlining the nature/pathology of this disease, and that it is suspected to be the cause of his issues in recent past, including his anemia, RAMONA, hypercalcemia and pathologic fracture(s). I have noted myeloma is not likely to be curable but treatable.     PLAN     Consider starting treatment while inpatient he is likely to go back to rehab  Consider [facility] if we start treatment inpatient; will touch base with our oncology/pharmacy team. Patient reports today he is not sure he wants to have chemotherapy.   Will investigate if transportation and/or treatment would be an issue regarding outpatient treatment  Scheduled to follow up with Dr. Martinez on 6/17/24 in the office; If patient is able to follow-up outpatient, consider [facility]  Will also later discuss consideration for referral to BMT   Will consult radiation oncology  Will follow up with case management  Rechecking B12/folate, iron studies, ferritin  Already on steroids for his respiratory issues      Approximately spent 81 minutes with patient, discussing the laboratory, imaging,   MALACHI Car CNP  MEDICAL ONCOLOGY  [facility]  6/12/2024    [facility] ([place]) Office  P: [phone]  F: [phone]    [facility] ([place]) Office  P: [phone]  F: [phone]    [facility] ([place]) Office  P: [phone]  F: [phone]

## 2024-06-12 NOTE — CARE COORDINATION
Social Work / Discharge PLanning : Patient updated BILLIE yesterday that he would like to go to [facility]. Referral was made yesterday and  Darling from [facility] did meet with patient and explained his insurance coverage as well as therapy plan. Patient did update Darling from [facility] that he wanted to wait to speak to Oncology in regards to results of Biopsy before any decisions for SNF. Dr Martinez Oncology came in last evening and met with patient and discussed results / plan. Patient does have Multiple Myeloma . Dr Martinez personally called BILLIE this am to see if he can start treatment while patient is in a SNF. BILLIE explained that the SNF will need to see exactly what treatment DR Martinez would like to start. Once treatment plan / duration is in place, then SW can see which facilities if any can accept patient. BILLIE explained to Oncology that there are many factors involved  for the SNF such as mainly the cost and transportation if needed to and from treatments. Darling from [facility] is aware that waiting on a plan . Once plan has been finalized, SW will updated [facility] to see if they can accept patient. If not, then SW will reach out to additional facilities to see who can accept patient, if any, and then will discuss with patient options if needed. BILLIE did meet with patient and updated.  BILLIE to follow. Electronically signed by SENDY Hernandez on 6/12/24 at 12:04 PM EDT .

## 2024-06-12 NOTE — PROGRESS NOTES
SPEECH LANGUAGE PATHOLOGY  DAILY PROGRESS NOTE      PATIENT NAME:  Derek Alvarado      :  1960          TODAY'S DATE:  2024 ROOM:  [facility]    Current Diet: ADULT DIET; Dysphagia - Pureed; Low Fat/Low Chol/High Fiber/MELISA    Patient seen for ongoing dysphagia tx. Patient engaged in further questioning of current swallow impairment as patient states he \"can not swallow solid food.\" When asked what the impairment was in particular, the patient reported \"I dont know what problem I have.\" SLP attempted to give patient examples such as food sticking in throat, coughing with eating. However, patient continued to report he just can't swallow. Patient encouraged to try puree to determine if same problem persists, as no structural or functional deficit was noted with puree or liquids on MBSS. Patient agreeable for sips of thin liquid, which no overt s/s of aspiration were noted.     Recommendation: cont current diet       CPT code(s) 26540  dysphagia tx  Total minutes :  15 minutes    Yun Pa M.S. CCC-SLP/L  Speech Language Pathologist  [id number]

## 2024-06-12 NOTE — PROGRESS NOTES
4 Eyes Skin Assessment     NAME:  Derek Alvarado  YOB: 1960  MEDICAL RECORD NUMBER:  [id number]    The patient is being assessed for  Admission    I agree that at least one RN has performed a thorough Head to Toe Skin Assessment on the patient. ALL assessment sites listed below have been assessed.      Areas assessed by both nurses:    Head, Face, Ears, Shoulders, Back, Chest, Arms, Elbows, Hands, and Legs. Feet and Heels        Does the Patient have a Wound? No noted wound(s)  PT REFUSING TO LET RN LOOK AT BUTTOCKS AND SACRUM  Tip Prevention initiated by RN: Yes  Wound Care Orders initiated by RN: No    Pressure Injury (Stage 3,4, Unstageable, DTI, NWPT, and Complex wounds) if present, place Wound referral order by RN under : No    New Ostomies, if present place, Ostomy referral order under : No     Nurse 1 eSignature: Electronically signed by felisa arce RN on 6/12/24 at 5:51 PM EDT    **SHARE this note so that the co-signing nurse can place an eSignature**    Nurse 2 eSignature: {Esignature:[id number]}

## 2024-06-12 NOTE — PLAN OF CARE
Problem: Pain  Goal: Verbalizes/displays adequate comfort level or baseline comfort level  6/11/2024 2346 by Iron Díaz RN  Outcome: Progressing  6/11/2024 1830 by Analisa Hassan RN  Outcome: Not Progressing  Flowsheets (Taken 6/11/2024 0930)  Verbalizes/displays adequate comfort level or baseline comfort level:   Encourage patient to monitor pain and request assistance   Administer analgesics based on type and severity of pain and evaluate response     Problem: Safety - Adult  Goal: Free from fall injury  6/11/2024 2346 by Iron Díaz RN  Outcome: Progressing  6/11/2024 1830 by Analisa Hassan RN  Outcome: Not Progressing     Problem: ABCDS Injury Assessment  Goal: Absence of physical injury  6/11/2024 2346 by Iron Díaz RN  Outcome: Progressing  6/11/2024 1830 by Analisa Hassan RN  Outcome: Not Progressing     Problem: Skin/Tissue Integrity  Goal: Absence of new skin breakdown  Description: 1.  Monitor for areas of redness and/or skin breakdown  2.  Assess vascular access sites hourly  3.  Every 4-6 hours minimum:  Change oxygen saturation probe site  4.  Every 4-6 hours:  If on nasal continuous positive airway pressure, respiratory therapy assess nares and determine need for appliance change or resting period.  6/11/2024 2346 by Iron Díaz RN  Outcome: Progressing  6/11/2024 1830 by Analisa Hassan RN  Outcome: Not Progressing     Problem: Respiratory - Adult  Goal: Achieves optimal ventilation and oxygenation  Outcome: Progressing     Problem: Pain  Goal: Verbalizes/displays adequate comfort level or baseline comfort level  6/11/2024 2346 by Iron Díaz RN  Outcome: Progressing  6/11/2024 1830 by Analisa Hassan RN  Outcome: Not Progressing  Flowsheets (Taken 6/11/2024 0930)  Verbalizes/displays adequate comfort level or baseline comfort level:   Encourage patient to monitor pain and request assistance   Administer analgesics based on type and severity of pain and evaluate response

## 2024-06-12 NOTE — PROGRESS NOTES
Physician Progress Note      PATIENT:               DASIA BUITRAGO  CSN #:                  [id number]  :                       1960  ADMIT DATE:       2024 1:59 PM  DISCH DATE:        2024 11:30 AM  RESPONDING  PROVIDER #:        Claire Shah MD          QUERY TEXT:    Pt admitted with low back pain and compression fracture of thoracolumbar. Pt   noted to have Multiple myeloma in discharge summary on 2024. If possible,   please document in progress notes and discharge summary the relationship, if   any, between compression fracture of thoracolumbar and Multiple myeloma.    The medical record reflects the following:    Risk Factors: osteopenia, osteoporosis, multiple myeloma, Hypercalcemia    Clinical Indicators: Consultation note on -The patient is a 64-year-old   gentleman with back pain and reports of compression fracture.  MRI Lumbar spine on -There is severe subacute compression fracture of T12   with up to 80% height loss centrally. Severe chronic compression fracture of   L1.  Moderate chronic compression fracture of L4. Mild chronic compression   deformities of T11, L2, L3 and L5.  DS on -Hypercalcemia, multiple myeloma. Lytic bone lesions.  Calcium ionised-1.34,  PTH intact-8.9    Treatment: Bone marrow biopsy, Hold HCTZ, calcitonin (MIACALCIN) injection 250   Units    Thank You,  MK Steinberg, CDS  Options provided:  -- Compression fracture of thoracolumbar due to Multiple myeloma  -- Compression fracture of thoracolumbar unrelated to Multiple myeloma  -- Other - I will add my own diagnosis  -- Disagree - Not applicable / Not valid  -- Disagree - Clinically unable to determine / Unknown  -- Refer to Clinical Documentation Reviewer    PROVIDER RESPONSE TEXT:    This patient has compression fracture of thoracolumbar due to Multiple   myeloma.    Query created by: Steffanie Adames on 2024 7:52 AM      Electronically signed by:  Claire Shah MD 2024 8:02

## 2024-06-13 LAB
MICROORGANISM SPEC CULT: NORMAL
MICROORGANISM SPEC CULT: NORMAL
SERVICE CMNT-IMP: NORMAL
SERVICE CMNT-IMP: NORMAL
SPECIMEN DESCRIPTION: NORMAL
SPECIMEN DESCRIPTION: NORMAL

## 2024-06-13 PROCEDURE — 2580000003 HC RX 258: Performed by: FAMILY MEDICINE

## 2024-06-13 PROCEDURE — 6360000002 HC RX W HCPCS: Performed by: INTERNAL MEDICINE

## 2024-06-13 PROCEDURE — 2580000003 HC RX 258: Performed by: INTERNAL MEDICINE

## 2024-06-13 PROCEDURE — 6360000002 HC RX W HCPCS: Performed by: FAMILY MEDICINE

## 2024-06-13 PROCEDURE — 6370000000 HC RX 637 (ALT 250 FOR IP): Performed by: FAMILY MEDICINE

## 2024-06-13 PROCEDURE — 94660 CPAP INITIATION&MGMT: CPT

## 2024-06-13 PROCEDURE — 6370000000 HC RX 637 (ALT 250 FOR IP): Performed by: INTERNAL MEDICINE

## 2024-06-13 PROCEDURE — 99232 SBSQ HOSP IP/OBS MODERATE 35: CPT | Performed by: STUDENT IN AN ORGANIZED HEALTH CARE EDUCATION/TRAINING PROGRAM

## 2024-06-13 PROCEDURE — 6360000002 HC RX W HCPCS: Performed by: STUDENT IN AN ORGANIZED HEALTH CARE EDUCATION/TRAINING PROGRAM

## 2024-06-13 PROCEDURE — 1200000000 HC SEMI PRIVATE

## 2024-06-13 PROCEDURE — 97530 THERAPEUTIC ACTIVITIES: CPT

## 2024-06-13 PROCEDURE — 2580000003 HC RX 258: Performed by: STUDENT IN AN ORGANIZED HEALTH CARE EDUCATION/TRAINING PROGRAM

## 2024-06-13 PROCEDURE — 94640 AIRWAY INHALATION TREATMENT: CPT

## 2024-06-13 PROCEDURE — 92526 ORAL FUNCTION THERAPY: CPT

## 2024-06-13 RX ORDER — PREDNISONE 10 MG/1
TABLET ORAL
Qty: 12 TABLET | Refills: 0 | Status: SHIPPED | OUTPATIENT
Start: 2024-06-14

## 2024-06-13 RX ORDER — SODIUM CHLORIDE 9 MG/ML
5-250 INJECTION, SOLUTION INTRAVENOUS PRN
Status: ACTIVE | OUTPATIENT
Start: 2024-06-13 | End: 2024-06-14

## 2024-06-13 RX ORDER — ONDANSETRON 2 MG/ML
8 INJECTION INTRAMUSCULAR; INTRAVENOUS ONCE
Status: COMPLETED | OUTPATIENT
Start: 2024-06-13 | End: 2024-06-13

## 2024-06-13 RX ORDER — SODIUM CHLORIDE 0.9 % (FLUSH) 0.9 %
5-40 SYRINGE (ML) INJECTION PRN
Status: ACTIVE | OUTPATIENT
Start: 2024-06-13 | End: 2024-06-14

## 2024-06-13 RX ORDER — ALBUTEROL SULFATE 90 UG/1
2 AEROSOL, METERED RESPIRATORY (INHALATION) 4 TIMES DAILY PRN
Qty: 18 G | Refills: 5 | Status: SHIPPED | OUTPATIENT
Start: 2024-06-13

## 2024-06-13 RX ADMIN — DEXAMETHASONE SODIUM PHOSPHATE 40 MG: 10 INJECTION INTRAMUSCULAR; INTRAVENOUS at 08:27

## 2024-06-13 RX ADMIN — SODIUM CHLORIDE, PRESERVATIVE FREE 10 ML: 5 INJECTION INTRAVENOUS at 08:54

## 2024-06-13 RX ADMIN — IPRATROPIUM BROMIDE AND ALBUTEROL SULFATE 1 DOSE: 2.5; .5 SOLUTION RESPIRATORY (INHALATION) at 11:22

## 2024-06-13 RX ADMIN — ENOXAPARIN SODIUM 40 MG: 100 INJECTION SUBCUTANEOUS at 08:55

## 2024-06-13 RX ADMIN — IPRATROPIUM BROMIDE AND ALBUTEROL SULFATE 1 DOSE: 2.5; .5 SOLUTION RESPIRATORY (INHALATION) at 07:45

## 2024-06-13 RX ADMIN — CYCLOPHOSPHAMIDE 540 MG: 200 INJECTION, SOLUTION INTRAVENOUS at 08:49

## 2024-06-13 RX ADMIN — ONDANSETRON 8 MG: 2 INJECTION INTRAMUSCULAR; INTRAVENOUS at 08:24

## 2024-06-13 RX ADMIN — DIGOXIN 125 MCG: 0.12 TABLET ORAL at 08:53

## 2024-06-13 RX ADMIN — SODIUM CHLORIDE 100 ML/HR: 9 INJECTION, SOLUTION INTRAVENOUS at 08:23

## 2024-06-13 RX ADMIN — BUDESONIDE INHALATION 500 MCG: 0.5 SUSPENSION RESPIRATORY (INHALATION) at 07:45

## 2024-06-13 RX ADMIN — SODIUM CHLORIDE, PRESERVATIVE FREE 10 ML: 5 INJECTION INTRAVENOUS at 13:32

## 2024-06-13 RX ADMIN — IPRATROPIUM BROMIDE AND ALBUTEROL SULFATE 1 DOSE: 2.5; .5 SOLUTION RESPIRATORY (INHALATION) at 15:42

## 2024-06-13 RX ADMIN — SODIUM CHLORIDE, PRESERVATIVE FREE 10 ML: 5 INJECTION INTRAVENOUS at 20:30

## 2024-06-13 RX ADMIN — FOLIC ACID 1 MG: 1 TABLET ORAL at 08:53

## 2024-06-13 RX ADMIN — IPRATROPIUM BROMIDE AND ALBUTEROL SULFATE 1 DOSE: 2.5; .5 SOLUTION RESPIRATORY (INHALATION) at 19:16

## 2024-06-13 RX ADMIN — METHYLPREDNISOLONE SODIUM SUCCINATE 40 MG: 40 INJECTION, POWDER, LYOPHILIZED, FOR SOLUTION INTRAMUSCULAR; INTRAVENOUS at 05:07

## 2024-06-13 RX ADMIN — METHYLPREDNISOLONE SODIUM SUCCINATE 40 MG: 40 INJECTION, POWDER, LYOPHILIZED, FOR SOLUTION INTRAMUSCULAR; INTRAVENOUS at 16:38

## 2024-06-13 RX ADMIN — BORTEZOMIB 2.75 MG: 1 INJECTION, POWDER, LYOPHILIZED, FOR SOLUTION INTRAVENOUS; SUBCUTANEOUS at 08:48

## 2024-06-13 RX ADMIN — BUDESONIDE INHALATION 500 MCG: 0.5 SUSPENSION RESPIRATORY (INHALATION) at 19:16

## 2024-06-13 RX ADMIN — CEFEPIME 2000 MG: 2 INJECTION, POWDER, FOR SOLUTION INTRAVENOUS at 13:34

## 2024-06-13 RX ADMIN — CEFEPIME 2000 MG: 2 INJECTION, POWDER, FOR SOLUTION INTRAVENOUS at 01:53

## 2024-06-13 NOTE — PROGRESS NOTES
SPEECH LANGUAGE PATHOLOGY  DAILY PROGRESS NOTE        PATIENT NAME:  Derek Alvarado      :  1960          TODAY'S DATE:  2024 ROOM:  0530/0530-A    Patient seen in room for skilled dysphagia tx. Patient w/ meal tray present at time of session. Patient consumed ice cream and applesauce with no oral stasis, good bolus formation;manipulation, and good AP tongue propulsion. No overt clinical indicators of pen/aspiration were noted. Patient continues to demonstrate poor PO intake. SLP encouraged patient to increase intake on current diet of pureed textures and thin liquids in order to maintain adequate nutrition and hydration. Patient continues to prefer pureed food items. Patient verbalized understanding and agreement. Will cont w/ POC. Continue with pureed consistency solids and thin liquids.       CPT code(s) 89062  dysphagia tx  Total minutes :  8 minutes    Mónica Mooney M.S., CCC-SLP  Speech-Language Pathologist  SP. [id number]

## 2024-06-13 NOTE — PLAN OF CARE
Problem: Respiratory - Adult  Goal: Achieves optimal ventilation and oxygenation  6/13/2024 1031 by Tayla Adler RN  Outcome: Progressing  6/12/2024 2327 by Lori Lynn RN  Outcome: Progressing     Problem: Skin/Tissue Integrity  Goal: Absence of new skin breakdown  6/12/2024 2327 by Lori Lynn RN  Outcome: Progressing     Problem: ABCDS Injury Assessment  Goal: Absence of physical injury  6/12/2024 2327 by Lori Lynn RN  Outcome: Progressing     Problem: Safety - Adult  Goal: Free from fall injury  6/13/2024 1031 by Tayla Adler RN  Outcome: Progressing  6/12/2024 2327 by Lori Lynn RN  Outcome: Progressing  Flowsheets (Taken 6/12/2024 1825 by Analisa Hassan RN)  Free From Fall Injury: Instruct family/caregiver on patient safety     Problem: Pain  Goal: Verbalizes/displays adequate comfort level or baseline comfort level  6/13/2024 1031 by Tayla Adler RN  Outcome: Progressing  6/12/2024 2327 by Lori Lynn RN  Outcome: Progressing

## 2024-06-13 NOTE — CARE COORDINATION
Transition of Care-transfer from . Patient came in from [facility], however previous SW made referral to [facility], patient requested SNF change. [facility] can accept-HOWEVER CANNOT ASSUME COST OF CHEMO AND RADIATION. No facility can assume weekly chemo and daily radiation. Call to father-unable to reach, call to girlfriend/roommate Krista, plan is [facility]. There is noway patient can return home, it is not handicap accessible. She was updated that chemo and radiation will not be covered. Requested updated therapy notes, as well as pre-cert submittal. CM following.    Jennifer ABRAHAMN, RN  [facility]

## 2024-06-13 NOTE — PROGRESS NOTES
Patient tolerated velcade injection and infusion of Cytoxan well. Vital signs stable. IV remains with good blood return. All questions answered. Floor nurse updated.

## 2024-06-13 NOTE — PROGRESS NOTES
25 mL/hr at 06/12/24 1517 25 mL at 06/12/24 1517    ondansetron (ZOFRAN-ODT) disintegrating tablet 4 mg  4 mg Oral Q8H PRN Lexii Dueñas APRN - CNP        Or    ondansetron (ZOFRAN) injection 4 mg  4 mg IntraVENous Q6H PRN Lexii Dueñas APRN - CNP        magnesium hydroxide (MILK OF MAGNESIA) 400 MG/5ML suspension 30 mL  30 mL Oral Daily PRN Lexii Dueñas APRN - CNP        enoxaparin (LOVENOX) injection 40 mg  40 mg SubCUTAneous Daily Lexii Dueñas APRN - CNP   40 mg at 06/12/24 0928    acetaminophen (TYLENOL) tablet 650 mg  650 mg Oral Q6H PRN Lexii Dueñas APRN - CNP   650 mg at 06/09/24 1710    Or    acetaminophen (TYLENOL) suppository 650 mg  650 mg Rectal Q6H PRN Lexii Dueñas APRN - CNP        ipratropium 0.5 mg-albuterol 2.5 mg (DUONEB) nebulizer solution 1 Dose  1 Dose Inhalation Q4H WA RT Lexii Dueñas APRN - CNP   1 Dose at 06/12/24 2030         No Known Allergies           Vitals:    06/12/24 2100   BP: (!) 152/86   Pulse: 92   Resp:    Temp:    SpO2:      Physical Exam  Constitutional:       General: He is not in acute distress.     Appearance: He is not toxic-appearing or diaphoretic.   HENT:      Head: Normocephalic.      Nose: Nose normal.      Mouth/Throat:      Mouth: Mucous membranes are moist.   Eyes:      General: No scleral icterus.     Extraocular Movements: Extraocular movements intact.   Pulmonary:      Effort: No respiratory distress.      Breath sounds: No stridor.   Abdominal:      General: There is no distension.      Palpations: There is no mass.   Musculoskeletal:         General: No swelling or deformity.      Cervical back: Normal range of motion. No rigidity.   Skin:     Coloration: Skin is not jaundiced or pale.   Neurological:      General: No focal deficit present.      Mental Status: He is alert and oriented to person, place, and time.   Psychiatric:         Mood and Affect: Mood normal.         Behavior: Behavior normal.         Thought  sign consent   Patient states he is willing to go through treatment; noted his father has concerns over treatment  Will investigate if transportation and/or treatment would be an issue regarding outpatient treatment  I have discussed with case management  Will consult radiation oncology  Rechecking B12/folate, iron studies, ferritin  Already on steroids for his respiratory issues       Dave Martinez MD  MEDICAL ONCOLOGY  [facility]  6/13/2024    [facility] ([place]) Office  P: [phone]  F: [phone]    [facility] ([place]) Office  P: [phone]  F: [phone]    [facility] ([place]) Office  P: [phone]  F: [phone]

## 2024-06-13 NOTE — PROGRESS NOTES
[facility]   Progress note      Subjective:    Patient resting comfortably in bed  States he has no adverse effects from first treatment given this afternoon    Objective:    BP (!) 142/78   Pulse 82   Temp 98 °F (36.7 °C)   Resp 16   Ht 1.753 m (5' 9\")   Wt 65.6 kg (144 lb 11.2 oz)   SpO2 94%   BMI 21.37 kg/m²     In: 120 [P.O.:120]  Out: 975   In: 120   Out: 975 [Urine:975]    General appearance: NAD, somnolent  HEENT: AT/NC, MMM  Neck: FROM, supple  Lungs: Diminished breath sounds, rhonchi  CV: RRR, no MRGs  Vasc: Radial pulses 2+  Abdomen: Soft, non-tender; no masses or HSM  Extremities: No peripheral edema or digital cyanosis  Skin: no rash, lesions or ulcers  Neuro: A&Ox3, intermit confusion       Recent Labs     06/11/24  0318 06/12/24  0724   WBC 8.7 8.7   HGB 7.2* 7.6*   HCT 22.4* 24.1*    166         Recent Labs     06/11/24  0318 06/12/24  0724    139   K 4.0 4.5   * 108*   CO2 25 23   BUN 38* 39*   CREATININE 1.2 1.1   CALCIUM 9.0 9.1         Assessment:    Principal Problem:    Acute hypoxic respiratory failure (HCC)  Resolved Problems:    * No resolved hospital problems. *      Plan:    64-year-old male who was recently diagnosed with multiple myeloma presents to the ED with hypoxia and is admitted to telemetry unit with     Acute hypoxic respiratory failure from COPD, now exacerbated with parainfluenza 3 virus  -Supplement O2 demands keeping oxygen saturation greater than 92%.  Patient currently on room air  IV Solu-Medrol 40 mg IV every 6 hours  Pulmonology evaluation as patient known to them  Supplemental oxygenation, may require noninvasive ventilation given his current status  -Maria Elena  Respiratory panel positive for parainfluenza 3-supportive care  No immediate need for IV antibiotic therapy  Procalcitonin is 0.31     Recent diagnosis of multiple myeloma  Undergoing treatment with radiation oncology-Dr. Hayes     Tachycardia  Multifactorial from  underlying COPD, anxiety  Telemetry monitoring  IV hydration    6/10/2024  Marked improvement in status today  Tolerating Vistaril  Sleeping on my evaluation with noninvasive ventilation in place  Blood cultures negative to date  Continue to adjust BiPAP settings at discretion of pulmonology  If recurrent agitation, can cautiously resume Ativan which apparently he takes at home regularly twice a day as needed  Heart rate better controlled  Mild renal insufficiency-IV fluid hydration-BUN/creatinine 35/1.4-continue to trend    6/11/24:  -Remains on IV Solu-Medrol 40 mg per pulmonology recommendations which has now been increased to every 8 today  -Modified barium swallow ordered following previous recommendations on prior admission recommending puréed consistency foods with thin liquids  -Oncology consult given recent bone marrow biopsy and further input  -Continue scheduled breathing treatments with IV cefepime  -Currently saturating 95% on room air    6/12/24:  -Remains on room air sating well  -IV solumedrol weaned to 40 mg q12h per pulm  -Hem/onc planning possible initiation of treatment for multiple myeloma inpatient, await further plans   -labs and vitals stable  -Rad/Onc consulted per Hem/onc   -Awaiting final plans from hem/onc to determine rehabs available to accommodate treatments     6/13/24:  -Patient remains on room air saturating well  -IV Solu-Medrol transition to p.o. prednisone per pulmonology  -First dose of chemo given this afternoon  -Patient has been accepted to [facility] and pre-CERT has been initiated however patient would have to stop any chemo/radiation treatments at that time  -Await input from oncology regarding further treatment needs/plans and if okay to stop while at rehab    Code Status: Full code  Consultants: Pulmonology and oncology       DVT Prophylaxis Lovenox  PT/OT  Discharge planning         I have spent a total time of 25 minutes of this patient encounter reviewing chart, labs,

## 2024-06-13 NOTE — PLAN OF CARE
Problem: Pain  Goal: Verbalizes/displays adequate comfort level or baseline comfort level  Outcome: Progressing     Problem: Safety - Adult  Goal: Free from fall injury  Outcome: Progressing  Flowsheets (Taken 6/12/2024 1825 by Analisa Hassan RN)  Free From Fall Injury: Instruct family/caregiver on patient safety     Problem: ABCDS Injury Assessment  Goal: Absence of physical injury  Outcome: Progressing     Problem: Skin/Tissue Integrity  Goal: Absence of new skin breakdown  Description: 1.  Monitor for areas of redness and/or skin breakdown  2.  Assess vascular access sites hourly  3.  Every 4-6 hours minimum:  Change oxygen saturation probe site  4.  Every 4-6 hours:  If on nasal continuous positive airway pressure, respiratory therapy assess nares and determine need for appliance change or resting period.  Outcome: Progressing     Problem: Respiratory - Adult  Goal: Achieves optimal ventilation and oxygenation  Outcome: Progressing     Problem: Discharge Planning  Goal: Discharge to home or other facility with appropriate resources  Outcome: Progressing

## 2024-06-13 NOTE — PROGRESS NOTES
Patient signed consent after speaking to Dr. Martinez about his multiple myeloma and treatment. Patient's vital signs are stable for treatment. Lab work within parameters. Patient with a left forearm IV with good blood return. All patient questions were answered.

## 2024-06-13 NOTE — PROGRESS NOTES
Occupational Therapy  OT BEDSIDE TREATMENT NOTE      Date:2024  Patient Name: Derek Alvarado  MRN: [id number]  : 1960  Room: [facility]     Evaluating OT: Vanita Patton OTR/L   [id number]       Referring Provider:Lexii Dueñas APRN - CNP     Specific Provider Orders/Date:OT eval and treat 2024       Diagnosis:  Dyspnea and respiratory abnormalities [R06.00, R06.89]  Hypoxia [R09.02]  Acute hypoxic respiratory failure (HCC) [J96.01]     Pertinent Medical History: compression fx T12, L1 & L4  2024  - chronic, compression deformities T11/L2/L3/L5   anxiety ,multiple myeloma    Precautions:  Fall Risk, O2      Assessment of current deficits    [x] Functional mobility            [x]ADLs           [x] Strength                  [x]Cognition    [x] Functional transfers          [x] IADLs         [x] Safety Awareness   [x]Endurance    [] Fine Coordination                         [x] Balance      [] Vision/perception   []Sensation      []Gross Motor Coordination             [] ROM           [] Delirium                   [] Motor Control      OT PLAN OF CARE   OT POC based on physician orders, patient diagnosis and results of clinical assessment     Frequency/Duration  2-4 days/wk for 2 - 4weeks PRN   Specific OT Treatment Interventions to include:   ADL retraining/adapted techniques and AE recommendations to increase functional independence within precautions                    Energy conservation techniques to improve tolerance for selfcare routine   Functional transfer/mobility training/DME recommendations for increased independence, safety and fall prevention         Patient/family education to increase safety and functional independence             Environmental modifications for safe mobility and completion of ADLs                             Therapeutic activity to improve functional performance during ADLs.                                         Therapeutic exercise to improve tolerance and

## 2024-06-13 NOTE — PROGRESS NOTES
Physical Therapy  Facility/Department: [address] INTERMEDIATE  Physical Therapy Treatment Note    Name: Derek Alvarado  : 1960  MRN: [id number]  Date of Service: 2024          Patient Diagnosis(es): The primary encounter diagnosis was Hypoxia. Diagnoses of Dyspnea and respiratory abnormalities and Multiple myeloma not having achieved remission (HCC) were also pertinent to this visit.  Past Medical History:  has a past medical history of Anxiety.  Past Surgical History:  has no past surgical history on file.               Evaluating Therapist: Michelle Francois PT     Referring Provider:  Lexii Dueñas APRN - CNP     PT order : PT eval and treat     Room #: 530  DIAGNOSIS: The primary encounter diagnosis was Hypoxia. A diagnosis of Dyspnea and respiratory abnormalities was also pertinent to this visit.    PRECAUTIONS: falls, O2 , [place]     Social:  Pt lives with artur   in a  1  floor plan  trailer , 3  steps and  1  rails to enter.  Admitted from SNF   Prior to admission pt walked with  ww     Initial Evaluation  Date: 6/10/2024  Treatment      Short Term/ Long Term   Goals   Was pt agreeable to Eval/treatment?  Yes  yes    Does pt have pain?  Mid back pain  No complaints    Bed Mobility  Rolling:  max assist   Supine to sit:  max assist   Sit to supine:  max assist   Scooting:  max assist x 2  Rolling: Min A  Supine to sit: Min A  Sit to supine: Mod A  Scooting: Mod A seated to EOB  Mod assist    Transfers Sit to stand: max assist   Stand to sit:  max assist   Stand pivot: NT  Sit <> stand: Mod A  Mod assist    Ambulation   NT Side stepping 2 feet with WW Mod A 50   feet with ww  with  mod assist        Stair negotiation: ascended and descended Nt   TBA    LE ROM  AAROM WFL      LE strength  3-/ 5 B hips, 4-/ 5 distally   Increase by 1-2/ 3 MMT grade    AM- PAC RAW score   10/ 24  12/24      Pt is alert, following instruction  Balance: poor standing and during brief mobility with WW    Pt performed  therapeutic exercise of the following: NT    Patient education/treatment  Pt was educated on UE usage for transfer safety, upright posture standing    Patient response to education:   Pt verbalized understanding Pt demonstrated skill Pt requires further education in this area   yes With prompt transfers yes     ASSESSMENT:   Comments: Nurse ok with Rx. Pt found in bed, agreed to Rx after encouragement. Pt assisted to EOB, sat EOB SBA. Pt stood using WW for support Mod A while receiving hygiene care. Side stepping performed with constant hands on assist for balance/safety/fall prevention. Pt fatigued after activity, requested back to bed.    Pt was left in bed as found with call light in reach.    Chair/bed alarm: bed active    Time in 1029   Time out 1045   Total Treatment Time 16 minutes   CPT codes:     Therapeutic activities 17212 16 minutes   Therapeutic exercises 85355 0 minutes       Pt is making minimal progress toward established Physical Therapy goals.  Continue with physical therapy current plan of care.    Philip Davis PTA   License Number: [id number]

## 2024-06-13 NOTE — PROGRESS NOTES
[facility]  [address], [address]  [place] [address]      Pulmonary Progress Note      SUBJECTIVE:  lying down in bed on RA, claims stable with breathing, on RA lying down in bed. Getting started on chemotherapy today    OBJECTIVE    Medications    Continuous Infusions:   sodium chloride 100 mL/hr (24 0823)    sodium chloride 25 mL (24 1517)       Scheduled Meds:   bortezomib (VELCADE) 2.75 mg in sodium chloride (PF) 0.9 % 1.1 mL chemo subcutaneous syringe  1.5 mg/m2 (Treatment Plan Recorded) SubCUTAneous Once    cycloPHOSphamide (CYTOXAN) 540 mg in sodium chloride 0.9 % 250 mL chemo IVPB  300 mg/m2 (Treatment Plan Recorded) IntraVENous Once    methylPREDNISolone  40 mg IntraVENous Q12H    budesonide  0.5 mg Nebulization BID RT    cefepime  2,000 mg IntraVENous Q12H    digoxin  125 mcg Oral Daily    folic acid  1 mg Oral Daily    sodium chloride flush  5-40 mL IntraVENous 2 times per day    enoxaparin  40 mg SubCUTAneous Daily    ipratropium 0.5 mg-albuterol 2.5 mg  1 Dose Inhalation Q4H WA RT       PRN Meds:sodium chloride, dexAMETHasone, sodium chloride flush, ipratropium 0.5 mg-albuterol 2.5 mg, morphine, hydrOXYzine, hydrOXYzine pamoate, sodium chloride flush, sodium chloride, ondansetron **OR** ondansetron, magnesium hydroxide, acetaminophen **OR** acetaminophen    Physical    VITALS:  BP (!) 150/84   Pulse 88   Temp 98 °F (36.7 °C)   Resp 16   Ht 1.753 m (5' 9\")   Wt 65.6 kg (144 lb 11.2 oz)   SpO2 94%   BMI 21.37 kg/m²     24HR INTAKE/OUTPUT:      Intake/Output Summary (Last 24 hours) at 2024 0836  Last data filed at 2024 2333  Gross per 24 hour   Intake --   Output 575 ml   Net -575 ml         24HR PULSE OXIMETRY RANGE:    SpO2  Av %  Min: 91 %  Max: 94 %    General appearance: alert, appears stated age, and cooperative  Lungs: rhonchi bilaterally with cough  Heart: regular rate and rhythm, S1, S2

## 2024-06-14 VITALS
HEIGHT: 69 IN | DIASTOLIC BLOOD PRESSURE: 90 MMHG | HEART RATE: 71 BPM | RESPIRATION RATE: 18 BRPM | BODY MASS INDEX: 21.43 KG/M2 | TEMPERATURE: 98.2 F | WEIGHT: 144.7 LBS | SYSTOLIC BLOOD PRESSURE: 154 MMHG | OXYGEN SATURATION: 90 %

## 2024-06-14 LAB — BONE MARROW REPORT: NORMAL

## 2024-06-14 PROCEDURE — 6370000000 HC RX 637 (ALT 250 FOR IP): Performed by: INTERNAL MEDICINE

## 2024-06-14 PROCEDURE — 6360000002 HC RX W HCPCS: Performed by: INTERNAL MEDICINE

## 2024-06-14 PROCEDURE — 2580000003 HC RX 258: Performed by: INTERNAL MEDICINE

## 2024-06-14 PROCEDURE — 92526 ORAL FUNCTION THERAPY: CPT

## 2024-06-14 PROCEDURE — 94640 AIRWAY INHALATION TREATMENT: CPT

## 2024-06-14 PROCEDURE — 6370000000 HC RX 637 (ALT 250 FOR IP): Performed by: FAMILY MEDICINE

## 2024-06-14 PROCEDURE — 6360000002 HC RX W HCPCS: Performed by: FAMILY MEDICINE

## 2024-06-14 PROCEDURE — 99232 SBSQ HOSP IP/OBS MODERATE 35: CPT | Performed by: CLINICAL NURSE SPECIALIST

## 2024-06-14 PROCEDURE — 2580000003 HC RX 258: Performed by: FAMILY MEDICINE

## 2024-06-14 PROCEDURE — 94660 CPAP INITIATION&MGMT: CPT

## 2024-06-14 RX ORDER — DEXAMETHASONE 0.5 MG/1
20 TABLET ORAL WEEKLY
Qty: 4 TABLET | Refills: 0 | OUTPATIENT
Start: 2024-06-20 | End: 2024-06-24

## 2024-06-14 RX ADMIN — CEFEPIME 2000 MG: 2 INJECTION, POWDER, FOR SOLUTION INTRAVENOUS at 01:52

## 2024-06-14 RX ADMIN — SODIUM CHLORIDE, PRESERVATIVE FREE 10 ML: 5 INJECTION INTRAVENOUS at 08:18

## 2024-06-14 RX ADMIN — PREDNISONE 30 MG: 20 TABLET ORAL at 08:16

## 2024-06-14 RX ADMIN — BUDESONIDE INHALATION 500 MCG: 0.5 SUSPENSION RESPIRATORY (INHALATION) at 06:22

## 2024-06-14 RX ADMIN — ENOXAPARIN SODIUM 40 MG: 100 INJECTION SUBCUTANEOUS at 08:17

## 2024-06-14 RX ADMIN — DIGOXIN 125 MCG: 0.12 TABLET ORAL at 08:16

## 2024-06-14 RX ADMIN — IPRATROPIUM BROMIDE AND ALBUTEROL SULFATE 1 DOSE: 2.5; .5 SOLUTION RESPIRATORY (INHALATION) at 11:50

## 2024-06-14 RX ADMIN — FOLIC ACID 1 MG: 1 TABLET ORAL at 08:16

## 2024-06-14 RX ADMIN — IPRATROPIUM BROMIDE AND ALBUTEROL SULFATE 1 DOSE: 2.5; .5 SOLUTION RESPIRATORY (INHALATION) at 06:22

## 2024-06-14 RX ADMIN — HYDROXYZINE PAMOATE 25 MG: 25 CAPSULE ORAL at 08:16

## 2024-06-14 ASSESSMENT — PAIN SCALES - GENERAL
PAINLEVEL_OUTOF10: 0
PAINLEVEL_OUTOF10: 0

## 2024-06-14 ASSESSMENT — PAIN SCALES - WONG BAKER: WONGBAKER_NUMERICALRESPONSE: NO HURT

## 2024-06-14 NOTE — PROGRESS NOTES
SPEECH LANGUAGE PATHOLOGY  DAILY PROGRESS NOTE        PATIENT NAME:  Derek Alvarado      :  1960          TODAY'S DATE:  2024 ROOM:  0530/0530-A    Patient seen in room for skilled dysphagia tx. Patient w/ meal tray present at time of session from morning meal. . SLP encouraged patient to increase intake on current diet of pureed textures and thin liquids in order to maintain adequate nutrition and hydration. Patient continues to prefer pureed food items. Patient stated he was not very hungry this morning and not enjoying puree textured foods. Expressed concerns with not eating \"regular foods\" anymore. Clinician educated and provided support regarding dysphagia treatment and goals. Patient verbalized understanding and agreement. Will cont w/ POC. Continue with pureed consistency solids and thin liquids.       CPT code(s) 35568  dysphagia tx  Total minutes :  7 minutes    Camila Betancourt M.A., CCC-SLP

## 2024-06-14 NOTE — CARE COORDINATION
Transition of Care-Auth obtained for [facility] ([phone]). [facility] will transport at 1300. Call to father to update as well as liaison. PURA, HECTOR and transportation form completed.    Jennifer HUYNH, RN  [facility]

## 2024-06-14 NOTE — PLAN OF CARE
Problem: Pain  Goal: Verbalizes/displays adequate comfort level or baseline comfort level  Outcome: Progressing     Problem: Safety - Adult  Goal: Free from fall injury  Outcome: Progressing     Problem: ABCDS Injury Assessment  Goal: Absence of physical injury  Outcome: Progressing     Problem: Skin/Tissue Integrity  Goal: Absence of new skin breakdown  Description: 1.  Monitor for areas of redness and/or skin breakdown  2.  Assess vascular access sites hourly  3.  Every 4-6 hours minimum:  Change oxygen saturation probe site  4.  Every 4-6 hours:  If on nasal continuous positive airway pressure, respiratory therapy assess nares and determine need for appliance change or resting period.  Outcome: Progressing     Problem: Respiratory - Adult  Goal: Achieves optimal ventilation and oxygenation  Outcome: Progressing     Problem: Discharge Planning  Goal: Discharge to home or other facility with appropriate resources  Outcome: Progressing  Flowsheets (Taken 6/14/2024 0031)  Discharge to home or other facility with appropriate resources:   Identify barriers to discharge with patient and caregiver   Arrange for needed discharge resources and transportation as appropriate   Identify discharge learning needs (meds, wound care, etc)   Refer to discharge planning if patient needs post-hospital services based on physician order or complex needs related to functional status, cognitive ability or social support system

## 2024-06-14 NOTE — PLAN OF CARE
Problem: Pain  Goal: Verbalizes/displays adequate comfort level or baseline comfort level  6/14/2024 1045 by Tayla Adler RN  Outcome: Progressing  6/14/2024 0046 by Ileana Aranda RN  Outcome: Progressing     Problem: Safety - Adult  Goal: Free from fall injury  6/14/2024 1045 by Tayla Adler RN  Outcome: Progressing  6/14/2024 0046 by Ileana Aranda RN  Outcome: Progressing     Problem: ABCDS Injury Assessment  Goal: Absence of physical injury  6/14/2024 0046 by Ileana Aranda RN  Outcome: Progressing     Problem: Skin/Tissue Integrity  Goal: Absence of new skin breakdown  6/14/2024 0046 by Ileana Aranda RN  Outcome: Progressing     Problem: Respiratory - Adult  Goal: Achieves optimal ventilation and oxygenation  6/14/2024 1045 by Tayla Adler RN  Outcome: Progressing  6/14/2024 0046 by Ileana Aranda RN  Outcome: Progressing

## 2024-06-14 NOTE — PROGRESS NOTES
[facility]    Inpatient Medical Oncology/Hematology Progress Note      CHIEF COMPLAINT:  Shortness of breath    HISTORY OF PRESENT ILLNESS (6/11/2024):   Patient is a 64 y.o. male comes in for respiratory failure. He has recently diagnosed multiple myeloma.    Patient appears fairly comfortable. No major issues.  Treatment started yesterday patient reporting he feels well overall        ROS: Negative except as noted above.    Current Facility-Administered Medications   Medication Dose Route Frequency Provider Last Rate Last Admin    predniSONE (DELTASONE) tablet 30 mg  30 mg Oral Daily Armani Abbott MD   30 mg at 06/14/24 0816    budesonide (PULMICORT) nebulizer suspension 500 mcg  0.5 mg Nebulization BID RT Armani Abbott MD   500 mcg at 06/14/24 0622    ipratropium 0.5 mg-albuterol 2.5 mg (DUONEB) nebulizer solution 1 Dose  1 Dose Inhalation Q4H PRN Lexii Dueñas APRN - CNP   1 Dose at 06/09/24 0207    ceFEPIme (MAXIPIME) 2,000 mg in sodium chloride 0.9 % 100 mL IVPB  2,000 mg IntraVENous Q12H Armani Abbott MD   Stopped at 06/14/24 0615    digoxin (LANOXIN) tablet 125 mcg  125 mcg Oral Daily Claire Ko MD   125 mcg at 06/14/24 0816    morphine (PF) injection 1 mg  1 mg IntraVENous Q4H PRN Claire Ko MD        hydrOXYzine (VISTARIL) injection 50 mg  50 mg IntraMUSCular Q6H PRN Piedad Emery APRN - CNP   50 mg at 06/10/24 0809    folic acid (FOLVITE) tablet 1 mg  1 mg Oral Daily Lexii Dueñas APRN - CNP   1 mg at 06/14/24 0816    hydrOXYzine pamoate (VISTARIL) capsule 25 mg  25 mg Oral TID PRN Lexii Dueñas APRN - CNP   25 mg at 06/14/24 0816    sodium chloride flush 0.9 % injection 5-40 mL  5-40 mL IntraVENous 2 times per day Lexii Dueñas APRN - CNP   10 mL at 06/14/24 0818    sodium chloride flush 0.9 % injection 5-40 mL  5-40 mL IntraVENous PRN Learn, Lexii Triny, APRN - CNP        0.9 % sodium chloride infusion   IntraVENous PRN Lexii Dueñas, MALACHI - CNP 25  mL/hr at 06/13/24 1754 Rate Verify at 06/13/24 1754    ondansetron (ZOFRAN-ODT) disintegrating tablet 4 mg  4 mg Oral Q8H PRN Lexii Dueñas APRN - CNP        Or    ondansetron (ZOFRAN) injection 4 mg  4 mg IntraVENous Q6H PRN Lexii Dueñas APRN - CNP        magnesium hydroxide (MILK OF MAGNESIA) 400 MG/5ML suspension 30 mL  30 mL Oral Daily PRN Lexii Dueñas APRN - CNP        enoxaparin (LOVENOX) injection 40 mg  40 mg SubCUTAneous Daily Lexii Dueñas APRN - CNP   40 mg at 06/14/24 0817    acetaminophen (TYLENOL) tablet 650 mg  650 mg Oral Q6H PRN Lexii Dueñas APRN - CNP   650 mg at 06/09/24 1710    Or    acetaminophen (TYLENOL) suppository 650 mg  650 mg Rectal Q6H PRN Lexii Dueñas APRN - CNP        ipratropium 0.5 mg-albuterol 2.5 mg (DUONEB) nebulizer solution 1 Dose  1 Dose Inhalation Q4H WA RT Lexii Dueñas APRN - CNP   1 Dose at 06/14/24 1150         No Known Allergies           Vitals:    06/14/24 0716   BP: (!) 154/90   Pulse: 71   Resp: 18   Temp: 98.2 °F (36.8 °C)   SpO2: 90%     Physical Exam  Constitutional:       General: He is not in acute distress.     Appearance: He is not toxic-appearing or diaphoretic.   HENT:      Head: Normocephalic.      Nose: Nose normal.      Mouth/Throat:      Mouth: Mucous membranes are moist.   Eyes:      General: No scleral icterus.     Extraocular Movements: Extraocular movements intact.   Pulmonary:      Effort: No respiratory distress.      Breath sounds: No stridor.   Abdominal:      General: There is no distension.      Palpations: There is no mass.   Musculoskeletal:         General: No swelling or deformity.      Cervical back: Normal range of motion. No rigidity.   Skin:     Coloration: Skin is not jaundiced or pale.   Neurological:      General: No focal deficit present.      Mental Status: He is alert and oriented to person, place, and time.   Psychiatric:         Mood and Affect: Mood normal.         Behavior: Behavior

## 2024-06-14 NOTE — DISCHARGE INSTRUCTIONS
Patient is NOT GETTING CHEMO/RADIATION WHILE AT [facility]-He is to follow up with his Oncologist upon discharge from facility. He is aware, as well as his father. Both in agreement.

## 2024-06-14 NOTE — PROGRESS NOTES
Auth receieved for facility. Message sent to Corie LY and also message sent to Dr Hoskins about patient going to facility and being on chemo, likely needing to be paused while at facility. Awaiting for return messages.

## 2024-06-14 NOTE — PROGRESS NOTES
This RN spoke with Dr Santoro office, patient just seen provider 6/7/2024, and can follow up outpatient for further plan of care and treatment.

## 2024-06-14 NOTE — PROGRESS NOTES
Spoke with Radha LY from oncology. Patient to discharge on decadron 20mg oral weekly on Thursdays, when he leaves for facility. Order placed.

## 2024-06-14 NOTE — DISCHARGE INSTR - COC
Continuity of Care Form    Patient Name: Derek Buckner   :  1960  MRN:  [id number]    Admit date:  2024  Discharge date:  2024      Code Status Order: Full Code   Advance Directives:     Admitting Physician:  Claire Ko MD  PCP: John Nuno APRN - CNP    Discharging Nurse: [facility] Unit/Room#: 0530/0530-A  Discharging Unit Phone Number: [phone]    Emergency Contact:   Extended Emergency Contact Information  Primary Emergency Contact: shanti graham  Address: [address]           [place], [place] [address] [place] [address]  Home Phone: [phone]  Mobile Phone: [phone]  Relation: Other  Secondary Emergency Contact: dacia buckner  Address: [address]  Home Phone: [phone]  Relation: Parent    Past Surgical History:  No past surgical history on file.    Immunization History:   Immunization History   Administered Date(s) Administered    Influenza, FLUARIX, FLULAVAL, FLUZONE (age 6 mo+) AND AFLURIA, (age 3 y+), PF, 0.5mL 10/25/2018    Td, unspecified formulation 2014       Active Problems:  Patient Active Problem List   Diagnosis Code    Anxiety F41.9    Essential hypertension I10    Hypercalcemia E83.52    Acute right-sided low back pain with sciatica M54.40    Lytic bone lesions on xray M89.9    Palliative care encounter Z51.5    Goals of care, counseling/discussion Z71.89    Macrocytic anemia D53.9    Multiple myeloma not having achieved remission (HCC) C90.00    Acute hypoxic respiratory failure (HCC) J96.01       Isolation/Infection:   Isolation            Contact          Patient Infection Status       Infection Onset Added Last Indicated Last Indicated By Review Planned Expiration Resolved Resolved By    Parainfluenza 24 Respiratory Panel, Molecular, with COVID-19 (Restricted: peds pts or suitable admitted adults) 06/15/24 06/18/24                          Nurse Assessment:  Last Vital Signs: BP (!) 154/90   Pulse 71   Temp 98.2 °F (36.8 °C)   Resp 18   Ht 1.753 m (5' 9\")   Wt 65.6 kg (144 lb 11.2 oz)   SpO2 90%   BMI 21.37 kg/m²     Last documented pain score (0-10 scale): Pain Level: 0  Last Weight:   Wt Readings from Last 1 Encounters:   06/08/24 65.6 kg (144 lb 11.2 oz)     Mental Status:  oriented and alert    IV Access:  - None    Nursing Mobility/ADLs:  Walking   Assisted  Transfer  assisted  Bathing  Assisted  Dressing  Assisted  Toileting  Assisted  Feeding  Independent  Med Admin  Independent  Med Delivery   crushed in applesauce    Wound Care Documentation and Therapy:        Elimination:  Continence:   Bowel: Yes  Bladder: Yes  Urinary Catheter: None   Colostomy/Ileostomy/Ileal Conduit: No       Date of Last BM: 6/12    Intake/Output Summary (Last 24 hours) at 6/14/2024 1103  Last data filed at 6/13/2024 1754  Gross per 24 hour   Intake 1093.73 ml   Output 400 ml   Net 693.73 ml     I/O last 3 completed shifts:  In: 1093.7 [P.O.:120; I.V.:227.9; IV Piggyback:745.8]  Out: 600 [Urine:600]    Safety Concerns:     At Risk for Falls    Impairments/Disabilities:      Hearing    Nutrition Therapy:  Current Nutrition Therapy:   - Oral Diet:  Dysphagia - Pureed and Low Fat    Routes of Feeding: Oral  Liquids: Thin Liquids  Daily Fluid Restriction: no  Last Modified Barium Swallow with Video (Video Swallowing Test): not done    Treatments at the Time of Hospital Discharge:   Respiratory Treatments: ***  Oxygen Therapy:  is not on home oxygen therapy.  Ventilator:    - No ventilator support    Rehab Therapies: Physical Therapy and Occupational Therapy  Weight Bearing Status/Restrictions: No weight bearing restrictions  Other Medical Equipment (for information only, NOT a DME order):  wheeled walker  Other Treatments: ***    Patient's personal belongings (please select all that are sent with patient):  {MEGAN DME Belongings:[id number]}    RN SIGNATURE:

## 2024-06-14 NOTE — DISCHARGE SUMMARY
appears within the normal range. There are no signs of mediastinal widening.  There are no signs of pneumothorax or pleural effusion.     Increased right upper lung parenchymal density concerning for worsening pneumonia and/or atelectasis.     CTA PULMONARY W CONTRAST    Result Date: 6/8/2024  EXAMINATION: CTA OF THE CHEST 6/8/2024 12:04 pm TECHNIQUE: CTA of the chest was performed after the administration of intravenous contrast.  Multiplanar reformatted images are provided for review.  MIP images are provided for review. Automated exposure control, iterative reconstruction, and/or weight based adjustment of the mA/kV was utilized to reduce the radiation dose to as low as reasonably achievable. COMPARISON: None. HISTORY: ORDERING SYSTEM PROVIDED HISTORY: SOB hypoxia TECHNOLOGIST PROVIDED HISTORY: Reason for exam:->SOB hypoxia Decision Support Exception - unselect if not a suspected or confirmed emergency medical condition->Emergency Medical Condition (MA) FINDINGS: Pulmonary Arteries: Pulmonary arteries are adequately opacified for evaluation to the segmental level.  The subsegmental branches are obscured by respiratory motion.  No evidence of intraluminal filling defect to suggest pulmonary embolism.  Main pulmonary artery is normal in caliber. Mediastinum: No evidence of mediastinal lymphadenopathy.  There is a trace pericardial effusion measuring 5 mm.  The heart and pericardium demonstrate no other significant abnormality.  There is no acute abnormality of the thoracic aorta. Lungs/pleura: There is respiratory motion artifact limiting evaluation of the fine lung details.  There is moderate dependent atelectasis in the lungs, most pronounced in the left lower lobe.  No focal consolidation or pulmonary edema.  No evidence of pleural effusion or pneumothorax. Upper Abdomen: There is asymmetric atrophy of the right kidney.  Note is made of a 4.6 cm upper pole left renal cyst.  Limited images of the upper abdomen  are otherwise unremarkable. Soft Tissues/Bones: No acute bone or soft tissue abnormality.     No evidence of segmental or larger pulmonary embolism. Trace pericardial effusion of unclear etiology. Moderate atelectatic changes in the lungs, most pronounced in the left lower lobe.     XR CHEST PORTABLE    Result Date: 6/8/2024  EXAMINATION: ONE XRAY VIEW OF THE CHEST 6/8/2024 8:55 am COMPARISON: None. HISTORY: ORDERING SYSTEM PROVIDED HISTORY: chest pain TECHNOLOGIST PROVIDED HISTORY: Reason for exam:->chest pain FINDINGS: Portable chest reveals heart to be enlarged.  Lung volumes are low.  Minimal atelectatic changes seen in the right mid lung.  No definite pleural effusion.  Degenerative changes seen within the spine.  Pulmonary vascularity is within normal limits.     Lung volumes are low with some atelectatic changes seen in the mid lungs.  No acute parenchymal disease.       Discharge Exam:    HEENT: NCAT,  PERRLA, No JVD  Heart:  RRR, no murmurs, gallops, or rubs.  Lungs:  CTA bilaterally, no wheeze, rales or rhonchi  Abd: bowel sounds present, nontender, nondistended, no masses  Extrem:  No clubbing, cyanosis, or edema    Disposition: SNF     Patient Condition at Discharge: Stable    Patient Instructions:      Medication List        START taking these medications      albuterol sulfate  (90 Base) MCG/ACT inhaler  Commonly known as: Ventolin HFA  Inhale 2 puffs into the lungs 4 times daily as needed for Wheezing     predniSONE 10 MG tablet  Commonly known as: DELTASONE  3 tabs daily x2 days, 2 tabs daily x2 days, 1 tab daily x2 days            STOP taking these medications      cyclobenzaprine 10 MG tablet  Commonly known as: FLEXERIL     folic acid 1 MG tablet  Commonly known as: FOLVITE     hydrOXYzine pamoate 25 MG capsule  Commonly known as: VISTARIL     sennosides-docusate sodium 8.6-50 MG tablet  Commonly known as: SENOKOT-S               Where to Get Your Medications        These medications were

## 2024-06-17 ENCOUNTER — HOSPITAL ENCOUNTER (OUTPATIENT)
Dept: INFUSION THERAPY | Age: 64
End: 2024-06-17

## 2024-06-17 ENCOUNTER — TELEPHONE (OUTPATIENT)
Dept: ONCOLOGY | Age: 64
End: 2024-06-17

## 2024-06-17 NOTE — TELEPHONE ENCOUNTER
Pt father Michael Alvarado called wanting to speak to doctor kimi states he had an appointment today but is too sick to make it but did have a couple questions regarding the pt...    1.) Pt Father wants to know what stage is pt cancer   2.)Pt father also is asking about the chemo if it available in a shot or liquid form   3.) Pt father ask if we keep doing chemo what is pt life span possibility   4. )Pt father wants to know if we stop the chemo completely what would pt life span be

## 2024-06-18 LAB
ALBUMIN SERPL-MCNC: 3.8 G/DL (ref 3.5–5.2)
ALP SERPL-CCNC: 85 U/L (ref 40–129)
ALT SERPL-CCNC: 19 U/L (ref 0–40)
ANION GAP SERPL CALCULATED.3IONS-SCNC: 10 MMOL/L (ref 7–16)
AST SERPL-CCNC: 14 U/L (ref 0–39)
BASOPHILS # BLD: 0.02 K/UL (ref 0–0.2)
BASOPHILS NFR BLD: 0 % (ref 0–2)
BILIRUB SERPL-MCNC: 1.7 MG/DL (ref 0–1.2)
BUN SERPL-MCNC: 54 MG/DL (ref 6–23)
CALCIUM SERPL-MCNC: 9.7 MG/DL (ref 8.6–10.2)
CHLORIDE SERPL-SCNC: 121 MMOL/L (ref 98–107)
CO2 SERPL-SCNC: 23 MMOL/L (ref 22–29)
CREAT SERPL-MCNC: 0.9 MG/DL (ref 0.7–1.2)
EOSINOPHIL # BLD: 0 K/UL (ref 0.05–0.5)
EOSINOPHILS RELATIVE PERCENT: 0 % (ref 0–6)
ERYTHROCYTE [DISTWIDTH] IN BLOOD BY AUTOMATED COUNT: 16.2 % (ref 11.5–15)
GFR, ESTIMATED: >90 ML/MIN/1.73M2
GLUCOSE SERPL-MCNC: 96 MG/DL (ref 74–99)
HCT VFR BLD AUTO: 32.5 % (ref 37–54)
HGB BLD-MCNC: 10.2 G/DL (ref 12.5–16.5)
IMM GRANULOCYTES # BLD AUTO: 0.18 K/UL (ref 0–0.58)
IMM GRANULOCYTES NFR BLD: 2 % (ref 0–5)
LYMPHOCYTES NFR BLD: 0.61 K/UL (ref 1.5–4)
LYMPHOCYTES RELATIVE PERCENT: 6 % (ref 20–42)
MCH RBC QN AUTO: 32.8 PG (ref 26–35)
MCHC RBC AUTO-ENTMCNC: 31.4 G/DL (ref 32–34.5)
MCV RBC AUTO: 104.5 FL (ref 80–99.9)
MONOCYTES NFR BLD: 0.78 K/UL (ref 0.1–0.95)
MONOCYTES NFR BLD: 7 % (ref 2–12)
NEUTROPHILS NFR BLD: 86 % (ref 43–80)
NEUTS SEG NFR BLD: 9.59 K/UL (ref 1.8–7.3)
PLATELET # BLD AUTO: 190 K/UL (ref 130–450)
PMV BLD AUTO: 10.8 FL (ref 7–12)
POTASSIUM SERPL-SCNC: 3.6 MMOL/L (ref 3.5–5)
PROT SERPL-MCNC: 8.3 G/DL (ref 6.4–8.3)
RBC # BLD AUTO: 3.11 M/UL (ref 3.8–5.8)
SODIUM SERPL-SCNC: 154 MMOL/L (ref 132–146)
WBC OTHER # BLD: 11.2 K/UL (ref 4.5–11.5)

## 2024-06-18 NOTE — TELEPHONE ENCOUNTER
I called pt father he stated that pt is in no condition to come in and they are looking at hospice right now, pt father is wondering if it would be ok to schedule a telephone visit

## 2024-06-20 LAB
ALBUMIN SERPL-MCNC: 3.5 G/DL (ref 3.5–5.2)
ALP SERPL-CCNC: 86 U/L (ref 40–129)
ALT SERPL-CCNC: 37 U/L (ref 0–40)
AMMONIA PLAS-SCNC: 13 UMOL/L (ref 16–60)
ANION GAP SERPL CALCULATED.3IONS-SCNC: 11 MMOL/L (ref 7–16)
AST SERPL-CCNC: 21 U/L (ref 0–39)
BASOPHILS # BLD: 0.03 K/UL (ref 0–0.2)
BASOPHILS NFR BLD: 0 % (ref 0–2)
BILIRUB DIRECT SERPL-MCNC: 0.4 MG/DL (ref 0–0.3)
BILIRUB INDIRECT SERPL-MCNC: 1.2 MG/DL (ref 0–1)
BILIRUB SERPL-MCNC: 1.6 MG/DL (ref 0–1.2)
BUN SERPL-MCNC: 40 MG/DL (ref 6–23)
CALCIUM SERPL-MCNC: 9.2 MG/DL (ref 8.6–10.2)
CHLORIDE SERPL-SCNC: 123 MMOL/L (ref 98–107)
CO2 SERPL-SCNC: 23 MMOL/L (ref 22–29)
CREAT SERPL-MCNC: 0.9 MG/DL (ref 0.7–1.2)
EOSINOPHIL # BLD: 0.02 K/UL (ref 0.05–0.5)
EOSINOPHILS RELATIVE PERCENT: 0 % (ref 0–6)
ERYTHROCYTE [DISTWIDTH] IN BLOOD BY AUTOMATED COUNT: 16.8 % (ref 11.5–15)
ERYTHROCYTE [SEDIMENTATION RATE] IN BLOOD BY WESTERGREN METHOD: 18 MM/HR (ref 0–15)
GFR, ESTIMATED: >90 ML/MIN/1.73M2
GLUCOSE SERPL-MCNC: 120 MG/DL (ref 74–99)
HCT VFR BLD AUTO: 34.6 % (ref 37–54)
HGB BLD-MCNC: 10.9 G/DL (ref 12.5–16.5)
IMM GRANULOCYTES # BLD AUTO: 0.18 K/UL (ref 0–0.58)
IMM GRANULOCYTES NFR BLD: 2 % (ref 0–5)
LYMPHOCYTES NFR BLD: 0.8 K/UL (ref 1.5–4)
LYMPHOCYTES RELATIVE PERCENT: 7 % (ref 20–42)
MCH RBC QN AUTO: 33.7 PG (ref 26–35)
MCHC RBC AUTO-ENTMCNC: 31.5 G/DL (ref 32–34.5)
MCV RBC AUTO: 107.1 FL (ref 80–99.9)
MONOCYTES NFR BLD: 0.69 K/UL (ref 0.1–0.95)
MONOCYTES NFR BLD: 6 % (ref 2–12)
NEUTROPHILS NFR BLD: 85 % (ref 43–80)
NEUTS SEG NFR BLD: 9.64 K/UL (ref 1.8–7.3)
PLATELET # BLD AUTO: 144 K/UL (ref 130–450)
PMV BLD AUTO: 11.3 FL (ref 7–12)
POTASSIUM SERPL-SCNC: 3 MMOL/L (ref 3.5–5)
PROT SERPL-MCNC: 7.4 G/DL (ref 6.4–8.3)
RBC # BLD AUTO: 3.23 M/UL (ref 3.8–5.8)
SODIUM SERPL-SCNC: 157 MMOL/L (ref 132–146)
TSH SERPL DL<=0.05 MIU/L-ACNC: 0.74 UIU/ML (ref 0.27–4.2)
VIT B12 SERPL-MCNC: 881 PG/ML (ref 211–946)
WBC OTHER # BLD: 11.4 K/UL (ref 4.5–11.5)

## 2024-06-24 LAB
ALBUMIN SERPL-MCNC: 3.2 G/DL (ref 3.5–5.2)
ALP SERPL-CCNC: 82 U/L (ref 40–129)
ALT SERPL-CCNC: 18 U/L (ref 0–40)
ANION GAP SERPL CALCULATED.3IONS-SCNC: 11 MMOL/L (ref 7–16)
AST SERPL-CCNC: 13 U/L (ref 0–39)
BASOPHILS # BLD: 0.01 K/UL (ref 0–0.2)
BASOPHILS NFR BLD: 0 % (ref 0–2)
BILIRUB SERPL-MCNC: 0.9 MG/DL (ref 0–1.2)
BUN SERPL-MCNC: 22 MG/DL (ref 6–23)
CALCIUM SERPL-MCNC: 8.9 MG/DL (ref 8.6–10.2)
CHLORIDE SERPL-SCNC: 124 MMOL/L (ref 98–107)
CO2 SERPL-SCNC: 18 MMOL/L (ref 22–29)
CREAT SERPL-MCNC: 0.8 MG/DL (ref 0.7–1.2)
EOSINOPHIL # BLD: 0.04 K/UL (ref 0.05–0.5)
EOSINOPHILS RELATIVE PERCENT: 0 % (ref 0–6)
ERYTHROCYTE [DISTWIDTH] IN BLOOD BY AUTOMATED COUNT: 18.2 % (ref 11.5–15)
GFR, ESTIMATED: >90 ML/MIN/1.73M2
GLUCOSE SERPL-MCNC: 119 MG/DL (ref 74–99)
HCT VFR BLD AUTO: 31.1 % (ref 37–54)
HGB BLD-MCNC: 9.5 G/DL (ref 12.5–16.5)
IMM GRANULOCYTES # BLD AUTO: 0.05 K/UL (ref 0–0.58)
IMM GRANULOCYTES NFR BLD: 1 % (ref 0–5)
LYMPHOCYTES NFR BLD: 0.6 K/UL (ref 1.5–4)
LYMPHOCYTES RELATIVE PERCENT: 7 % (ref 20–42)
MCH RBC QN AUTO: 32.9 PG (ref 26–35)
MCHC RBC AUTO-ENTMCNC: 30.5 G/DL (ref 32–34.5)
MCV RBC AUTO: 107.6 FL (ref 80–99.9)
MONOCYTES NFR BLD: 0.61 K/UL (ref 0.1–0.95)
MONOCYTES NFR BLD: 7 % (ref 2–12)
NEUTROPHILS NFR BLD: 85 % (ref 43–80)
NEUTS SEG NFR BLD: 7.66 K/UL (ref 1.8–7.3)
PLATELET # BLD AUTO: 106 K/UL (ref 130–450)
PMV BLD AUTO: 11.8 FL (ref 7–12)
POTASSIUM SERPL-SCNC: 4.2 MMOL/L (ref 3.5–5)
PROT SERPL-MCNC: 6.5 G/DL (ref 6.4–8.3)
RBC # BLD AUTO: 2.89 M/UL (ref 3.8–5.8)
SODIUM SERPL-SCNC: 153 MMOL/L (ref 132–146)
WBC OTHER # BLD: 9 K/UL (ref 4.5–11.5)

## 2024-06-27 LAB
ANION GAP SERPL CALCULATED.3IONS-SCNC: 13 MMOL/L (ref 7–16)
BUN SERPL-MCNC: 14 MG/DL (ref 6–23)
CALCIUM SERPL-MCNC: 8.7 MG/DL (ref 8.6–10.2)
CHLORIDE SERPL-SCNC: 113 MMOL/L (ref 98–107)
CO2 SERPL-SCNC: 19 MMOL/L (ref 22–29)
CREAT SERPL-MCNC: 0.8 MG/DL (ref 0.7–1.2)
ERYTHROCYTE [DISTWIDTH] IN BLOOD BY AUTOMATED COUNT: 18 % (ref 11.5–15)
GFR, ESTIMATED: >90 ML/MIN/1.73M2
GLUCOSE SERPL-MCNC: 96 MG/DL (ref 74–99)
HCT VFR BLD AUTO: 29.3 % (ref 37–54)
HGB BLD-MCNC: 9.4 G/DL (ref 12.5–16.5)
MCH RBC QN AUTO: 34.1 PG (ref 26–35)
MCHC RBC AUTO-ENTMCNC: 32.1 G/DL (ref 32–34.5)
MCV RBC AUTO: 106.2 FL (ref 80–99.9)
PLATELET # BLD AUTO: 104 K/UL (ref 130–450)
PMV BLD AUTO: 11.4 FL (ref 7–12)
POTASSIUM SERPL-SCNC: 3.2 MMOL/L (ref 3.5–5)
RBC # BLD AUTO: 2.76 M/UL (ref 3.8–5.8)
SODIUM SERPL-SCNC: 145 MMOL/L (ref 132–146)
WBC OTHER # BLD: 6.8 K/UL (ref 4.5–11.5)

## (undated) PROCEDURE — 0QB33ZX EXCISION OF LEFT PELVIC BONE, PERCUTANEOUS APPROACH, DIAGNOSTIC: ICD-10-PCS